# Patient Record
Sex: FEMALE | Race: WHITE | NOT HISPANIC OR LATINO | Employment: FULL TIME | URBAN - METROPOLITAN AREA
[De-identification: names, ages, dates, MRNs, and addresses within clinical notes are randomized per-mention and may not be internally consistent; named-entity substitution may affect disease eponyms.]

---

## 2018-07-04 ENCOUNTER — APPOINTMENT (OUTPATIENT)
Dept: CT IMAGING | Facility: CLINIC | Age: 33
End: 2018-07-04
Attending: EMERGENCY MEDICINE
Payer: COMMERCIAL

## 2018-07-04 ENCOUNTER — APPOINTMENT (OUTPATIENT)
Dept: ULTRASOUND IMAGING | Facility: CLINIC | Age: 33
End: 2018-07-04
Attending: EMERGENCY MEDICINE
Payer: COMMERCIAL

## 2018-07-04 ENCOUNTER — HOSPITAL ENCOUNTER (INPATIENT)
Facility: CLINIC | Age: 33
LOS: 3 days | Discharge: HOME | End: 2018-07-08
Attending: EMERGENCY MEDICINE | Admitting: OBSTETRICS & GYNECOLOGY
Payer: COMMERCIAL

## 2018-07-04 DIAGNOSIS — D62 ANEMIA DUE TO BLOOD LOSS, ACUTE: ICD-10-CM

## 2018-07-04 DIAGNOSIS — Z98.890 S/P LAPAROTOMY: Primary | ICD-10-CM

## 2018-07-04 DIAGNOSIS — R19.00 PELVIC MASS: ICD-10-CM

## 2018-07-04 DIAGNOSIS — N83.519 OVARIAN TORSION: ICD-10-CM

## 2018-07-04 LAB
ALBUMIN SERPL-MCNC: 3.4 G/DL (ref 3.4–5)
ALBUMIN UR-MCNC: 30 MG/DL
ALP SERPL-CCNC: 79 U/L (ref 40–150)
ALT SERPL W P-5'-P-CCNC: 33 U/L (ref 0–50)
ANION GAP SERPL CALCULATED.3IONS-SCNC: 5 MMOL/L (ref 3–14)
APPEARANCE UR: ABNORMAL
AST SERPL W P-5'-P-CCNC: 16 U/L (ref 0–45)
BACTERIA #/AREA URNS HPF: ABNORMAL /HPF
BACTERIA SPEC CULT: NORMAL
BASOPHILS # BLD AUTO: 0 10E9/L (ref 0–0.2)
BASOPHILS NFR BLD AUTO: 0.2 %
BILIRUB DIRECT SERPL-MCNC: <0.1 MG/DL (ref 0–0.2)
BILIRUB SERPL-MCNC: 0.2 MG/DL (ref 0.2–1.3)
BILIRUB UR QL STRIP: NEGATIVE
BUN SERPL-MCNC: 11 MG/DL (ref 7–30)
CALCIUM SERPL-MCNC: 8.9 MG/DL (ref 8.5–10.1)
CHLORIDE SERPL-SCNC: 103 MMOL/L (ref 94–109)
CO2 SERPL-SCNC: 29 MMOL/L (ref 20–32)
COLOR UR AUTO: YELLOW
CREAT SERPL-MCNC: 0.75 MG/DL (ref 0.52–1.04)
DIFFERENTIAL METHOD BLD: ABNORMAL
EOSINOPHIL # BLD AUTO: 0.2 10E9/L (ref 0–0.7)
EOSINOPHIL NFR BLD AUTO: 1 %
ERYTHROCYTE [DISTWIDTH] IN BLOOD BY AUTOMATED COUNT: 17.8 % (ref 10–15)
GFR SERPL CREATININE-BSD FRML MDRD: 89 ML/MIN/1.7M2
GLUCOSE SERPL-MCNC: 136 MG/DL (ref 70–99)
GLUCOSE UR STRIP-MCNC: NEGATIVE MG/DL
HCG SERPL QL: NEGATIVE
HCT VFR BLD AUTO: 32.1 % (ref 35–47)
HGB BLD-MCNC: 9.6 G/DL (ref 11.7–15.7)
HGB UR QL STRIP: NEGATIVE
IMM GRANULOCYTES # BLD: 0.1 10E9/L (ref 0–0.4)
IMM GRANULOCYTES NFR BLD: 0.5 %
KETONES UR STRIP-MCNC: NEGATIVE MG/DL
LEUKOCYTE ESTERASE UR QL STRIP: ABNORMAL
LIPASE SERPL-CCNC: 76 U/L (ref 73–393)
LYMPHOCYTES # BLD AUTO: 2.6 10E9/L (ref 0.8–5.3)
LYMPHOCYTES NFR BLD AUTO: 17.4 %
MCH RBC QN AUTO: 23.5 PG (ref 26.5–33)
MCHC RBC AUTO-ENTMCNC: 29.9 G/DL (ref 31.5–36.5)
MCV RBC AUTO: 79 FL (ref 78–100)
MONOCYTES # BLD AUTO: 0.5 10E9/L (ref 0–1.3)
MONOCYTES NFR BLD AUTO: 3.2 %
MUCOUS THREADS #/AREA URNS LPF: PRESENT /LPF
NEUTROPHILS # BLD AUTO: 11.8 10E9/L (ref 1.6–8.3)
NEUTROPHILS NFR BLD AUTO: 77.7 %
NITRATE UR QL: NEGATIVE
NRBC # BLD AUTO: 0 10*3/UL
NRBC BLD AUTO-RTO: 0 /100
PH UR STRIP: 5 PH (ref 5–7)
PLATELET # BLD AUTO: 605 10E9/L (ref 150–450)
POTASSIUM SERPL-SCNC: 4.5 MMOL/L (ref 3.4–5.3)
PROT SERPL-MCNC: 7.5 G/DL (ref 6.8–8.8)
RBC # BLD AUTO: 4.08 10E12/L (ref 3.8–5.2)
RBC #/AREA URNS AUTO: 3 /HPF (ref 0–2)
SODIUM SERPL-SCNC: 137 MMOL/L (ref 133–144)
SOURCE: ABNORMAL
SP GR UR STRIP: 1.03 (ref 1–1.03)
SPECIMEN SOURCE: NORMAL
SQUAMOUS #/AREA URNS AUTO: 9 /HPF (ref 0–1)
UROBILINOGEN UR STRIP-MCNC: 0 MG/DL (ref 0–2)
WBC # BLD AUTO: 15.1 10E9/L (ref 4–11)
WBC #/AREA URNS AUTO: 16 /HPF (ref 0–5)

## 2018-07-04 PROCEDURE — 80048 BASIC METABOLIC PNL TOTAL CA: CPT | Performed by: EMERGENCY MEDICINE

## 2018-07-04 PROCEDURE — 25000128 H RX IP 250 OP 636: Performed by: EMERGENCY MEDICINE

## 2018-07-04 PROCEDURE — 96376 TX/PRO/DX INJ SAME DRUG ADON: CPT

## 2018-07-04 PROCEDURE — 96374 THER/PROPH/DIAG INJ IV PUSH: CPT | Mod: 59

## 2018-07-04 PROCEDURE — 81001 URINALYSIS AUTO W/SCOPE: CPT | Performed by: EMERGENCY MEDICINE

## 2018-07-04 PROCEDURE — 76856 US EXAM PELVIC COMPLETE: CPT

## 2018-07-04 PROCEDURE — 96375 TX/PRO/DX INJ NEW DRUG ADDON: CPT

## 2018-07-04 PROCEDURE — 84703 CHORIONIC GONADOTROPIN ASSAY: CPT | Performed by: EMERGENCY MEDICINE

## 2018-07-04 PROCEDURE — 99285 EMERGENCY DEPT VISIT HI MDM: CPT | Mod: 25

## 2018-07-04 PROCEDURE — 80076 HEPATIC FUNCTION PANEL: CPT | Performed by: EMERGENCY MEDICINE

## 2018-07-04 PROCEDURE — 85025 COMPLETE CBC W/AUTO DIFF WBC: CPT | Performed by: EMERGENCY MEDICINE

## 2018-07-04 PROCEDURE — 96361 HYDRATE IV INFUSION ADD-ON: CPT

## 2018-07-04 PROCEDURE — 83690 ASSAY OF LIPASE: CPT | Performed by: EMERGENCY MEDICINE

## 2018-07-04 PROCEDURE — 74177 CT ABD & PELVIS W/CONTRAST: CPT

## 2018-07-04 PROCEDURE — 87086 URINE CULTURE/COLONY COUNT: CPT | Performed by: EMERGENCY MEDICINE

## 2018-07-04 RX ORDER — ONDANSETRON 2 MG/ML
4 INJECTION INTRAMUSCULAR; INTRAVENOUS ONCE
Status: COMPLETED | OUTPATIENT
Start: 2018-07-04 | End: 2018-07-04

## 2018-07-04 RX ORDER — HYDROMORPHONE HYDROCHLORIDE 1 MG/ML
0.5 INJECTION, SOLUTION INTRAMUSCULAR; INTRAVENOUS; SUBCUTANEOUS
Status: DISCONTINUED | OUTPATIENT
Start: 2018-07-04 | End: 2018-07-05

## 2018-07-04 RX ORDER — HYDROMORPHONE HYDROCHLORIDE 1 MG/ML
0.5 INJECTION, SOLUTION INTRAMUSCULAR; INTRAVENOUS; SUBCUTANEOUS
Status: COMPLETED | OUTPATIENT
Start: 2018-07-04 | End: 2018-07-04

## 2018-07-04 RX ORDER — IOPAMIDOL 755 MG/ML
500 INJECTION, SOLUTION INTRAVASCULAR ONCE
Status: COMPLETED | OUTPATIENT
Start: 2018-07-04 | End: 2018-07-04

## 2018-07-04 RX ORDER — HYDROXYCHLOROQUINE SULFATE 200 MG/1
400 TABLET, FILM COATED ORAL DAILY
Status: ON HOLD | COMMUNITY
End: 2022-07-29

## 2018-07-04 RX ADMIN — Medication 0.5 MG: at 21:42

## 2018-07-04 RX ADMIN — Medication 0.5 MG: at 20:53

## 2018-07-04 RX ADMIN — SODIUM CHLORIDE 1000 ML: 9 INJECTION, SOLUTION INTRAVENOUS at 20:17

## 2018-07-04 RX ADMIN — IOPAMIDOL 100 ML: 755 INJECTION, SOLUTION INTRAVENOUS at 22:11

## 2018-07-04 RX ADMIN — Medication 0.5 MG: at 22:54

## 2018-07-04 RX ADMIN — SODIUM CHLORIDE 65 ML: 900 INJECTION, SOLUTION INTRAVENOUS at 22:11

## 2018-07-04 RX ADMIN — Medication 0.5 MG: at 20:17

## 2018-07-04 RX ADMIN — ONDANSETRON 4 MG: 2 INJECTION INTRAMUSCULAR; INTRAVENOUS at 20:17

## 2018-07-04 ASSESSMENT — ENCOUNTER SYMPTOMS
NAUSEA: 1
ABDOMINAL PAIN: 1
VOMITING: 0
FEVER: 1

## 2018-07-04 NOTE — IP AVS SNAPSHOT
[facility]    [address]    [facility] [address]    Phone:  [phone]    Fax:  [phone]                                       After Visit Summary   7/4/2018    Melissa Gibbons    MRN: [id number]           After Visit Summary Signature Page     I have received my discharge instructions, and my questions have been answered. I have discussed any challenges I see with this plan with the nurse or doctor.    ..........................................................................................................................................  Patient/Patient Representative Signature      ..........................................................................................................................................  Patient Representative Print Name and Relationship to Patient    ..................................................               ................................................  Date                                            Time    ..........................................................................................................................................  Reviewed by Signature/Title    ...................................................              ..............................................  Date                                                            Time

## 2018-07-04 NOTE — IP AVS SNAPSHOT
MRN:[id number]                      After Visit Summary   7/4/2018    Melissa Gibbons    MRN: [id number]           Thank you!     Thank you for choosing [facility] for your care. Our goal is always to provide you with excellent care. Hearing back from our patients is one way we can continue to improve our services. Please take a few minutes to complete the written survey that you may receive in the mail after you visit. If you would like to speak to someone directly about your visit please contact Patient Relations at [phone]. Thank you!          Patient Information     Date Of Birth          1985        Designated Caregiver       Most Recent Value    Caregiver    Will someone help with your care after discharge? no      About your hospital stay     You were admitted on:  July 5, 2018 You last received care in the:  [facility]    You were discharged on:  July 8, 2018       Who to Call     For medical emergencies, please call 911.  For non-urgent questions about your medical care, please call your primary care provider or clinic, [phone]  For questions related to your surgery, please call your surgery clinic        Attending Provider     Provider Specialty    Bhakti Orourke MD Emergency Medicine    [facility], Gurpreet Artis MD Emergency Medicine    Makayla Damian,  OB/Gyn       Primary Care Provider Office Phone # Fax #    Sanjay Bustillos [phone]      Further instructions from your care team       Dear Melissa-    I'm glad you are doing well enough to go home!    A few limitations after surgery:    No tampons/douching/intercourse x 3 weeks  No lifting more than 25 for 6 weeks.  It takes that long for full strength healing  No driving for at least a week, or until you are off narcotics and sure you can handle the vehicle without limitation.    Pain Medications:  Ibuprofen 600 mg every 6  Hours  Oxycodone 5-10 mg every 4-6 hours as  "needed for severe pain    You are anemic- so take it easy besides walking. To correct this anemia, you need more iron.  I have prescribed:  Ferrous sulfate 325 mg tablet twice daily x 30 days, and due to risk of constipation:  Senna-S stool softener twice daily as needed.    Continue other preop medications as before.    Be sure to call Dr. Damian's clinic to schedule a post op check in two weeks:  [phone]  (call soon)    Please also be sure to call if you have fever, worsening pain, shortness of breath or chest pain, redness around the incision or drainage, or any other questions.     Take care--    Martita Tillman MD 2018           Pending Results     Date and Time Order Name Status Description    2018 0431 Surgical pathology exam In process             Admission Information     Date & Time Provider Department Dept. Phone    2018 Makayla Damian,  [facility] [phone]      Your Vitals Were     Blood Pressure Pulse Temperature Respirations Height Weight    147/76 86 98.5  F (36.9  C) (Oral) 16 1.575 m (5' 2\") 99.8 kg (220 lb)    Pulse Oximetry BMI (Body Mass Index)                94% 40.24 kg/m2          [employer] Information     [employer] lets you send messages to your doctor, view your test results, renew your prescriptions, schedule appointments and more. To sign up, go to [email or web address] . Click on \"Log in\" on the left side of the screen, which will take you to the Welcome page. Then click on \"Sign up Now\" on the right side of the page.     You will be asked to enter the access code listed below, as well as some personal information. Please follow the directions to create your username and password.     Your access code is: [email or web address]  Expires: 10/6/2018  3:42 PM     Your access code will  in 90 days. If you need help or a new code, please call your [place] clinic or [phone].        Care EveryWhere ID     This is your Care EveryWhere ID. " This could be used by other organizations to access your [facility] medical records  [id number]        Equal Access to Services     ANTONIO PHILLIPS : Hadii kalli Snowden, wairene larson, isidoroivonne mathewshelbynick sawyer, kam santacruzana mariaciera mckenna. So [facility] [phone].    ATENCIÓN: Si habla español, tiene a feliz disposición servicios gratuitos de asistencia lingüística. Llame al [phone].    We comply with applicable federal civil rights laws and [place] laws. We do not discriminate on the basis of race, color, national origin, age, disability, sex, sexual orientation, or gender identity.               Review of your medicines      START taking        Dose / Directions    ferrous sulfate 325 (65 Fe) MG tablet   Commonly known as:  IRON   Used for:  Pelvic mass, Anemia due to blood loss, acute        Dose:  325 mg   Take 1 tablet (325 mg) by mouth 2 times daily   Quantity:  60 tablet   Refills:  2       ibuprofen 600 MG tablet   Commonly known as:  ADVIL/MOTRIN   Used for:  Pelvic mass        Dose:  600 mg   Take 1 tablet (600 mg) by mouth every 6 hours as needed for moderate pain   Quantity:  90 tablet   Refills:  1       oxyCODONE IR 5 MG tablet   Commonly known as:  ROXICODONE   Used for:  Pelvic mass        Dose:  5-10 mg   Take 1-2 tablets (5-10 mg) by mouth every 6 hours as needed for severe pain   Quantity:  10 tablet   Refills:  0       senna-docusate 8.6-50 MG per tablet   Commonly known as:  SENOKOT-S;PERICOLACE   Used for:  Pelvic mass, Anemia due to blood loss, acute        Dose:  1 tablet   Take 1 tablet by mouth 2 times daily as needed for constipation   Quantity:  60 tablet   Refills:  1         CONTINUE these medicines which have NOT CHANGED        Dose / Directions    hydroxychloroquine 200 MG tablet   Commonly known as:  PLAQUENIL        Dose:  400 mg   Take 400 mg by mouth daily   Refills:  0       order for DME   Used for:  Foot injury        Post op shoe   Quantity:  1 Device    Refills:  0       PROZAC PO        Dose:  40 mg   Take 40 mg by mouth daily   Refills:  0            Where to get your medicines      Some of these will need a paper prescription and others can be bought over the counter. Ask your nurse if you have questions.     Bring a paper prescription for each of these medications     ferrous sulfate 325 (65 Fe) MG tablet    ibuprofen 600 MG tablet    oxyCODONE IR 5 MG tablet    senna-docusate 8.6-50 MG per tablet                Protect others around you: Learn how to safely use, store and throw away your medicines at www.disposemymeds.org.        ANTIBIOTIC INSTRUCTION     You've Been Prescribed an Antibiotic - Now What?  Your healthcare team thinks that you or your loved one might have an infection. Some infections can be treated with antibiotics, which are powerful, life-saving drugs. Like all medications, antibiotics have side effects and should only be used when necessary. There are some important things you should know about your antibiotic treatment.      Your healthcare team may run tests before you start taking an antibiotic.    Your team may take samples (e.g., from your blood, urine or other areas) to run tests to look for bacteria. These test can be important to determine if you need an antibiotic at all and, if you do, which antibiotic will work best.      Within a few days, your healthcare team might change or even stop your antibiotic.    Your team may start you on an antibiotic while they are working to find out what is making you sick.    Your team might change your antibiotic because test results show that a different antibiotic would be better to treat your infection.    In some cases, once your team has more information, they learn that you do not need an antibiotic at all. They may find out that you don't have an infection, or that the antibiotic you're taking won't work against your infection. For example, an infection caused by a virus can't be treated  with antibiotics. Staying on an antibiotic when you don't need it is more likely to be harmful than helpful.      You may experience side effects from your antibiotic.    Like all medications, antibiotics have side effects. Some of these can be serious.    Let you healthcare team know if you have any known allergies when you are admitted to the hospital.    One significant side effect of nearly all antibiotics is the risk of severe and sometimes deadly diarrhea caused by Clostridium difficile (C. Difficile). This occurs when a person takes antibiotics because some good germs are destroyed. Antibiotic use allows C. diificile to take over, putting patients at high risk for this serious infection.    As a patient or caregiver, it is important to understand your or your loved one's antibiotic treatment. It is especially important for caregivers to speak up when patients can't speak for themselves. Here are some important questions to ask your healthcare team.    What infection is this antibiotic treating and how do you know I have that infection?    What side effects might occur from this antibiotic?    How long will I need to take this antibiotic?    Is it safe to take this antibiotic with other medications or supplements (e.g., vitamins) that I am taking?     Are there any special directions I need to know about taking this antibiotic? For example, should I take it with food?    How will I be monitored to know whether my infection is responding to the antibiotic?    What tests may help to make sure the right antibiotic is prescribed for me?      Information provided by:  www.cdc.gov/getsmart  U.S. Department of Health and Human Services  Centers for disease Control and Prevention  National Center for Emerging and Zoonotic Infectious Diseases  Division of Healthcare Quality Promotion        Information about OPIOIDS     PRESCRIPTION OPIOIDS: WHAT YOU NEED TO KNOW   We gave you an opioid (narcotic) pain medicine. It is  important to manage your pain, but opioids are not always the best choice. You should first try all the other options your care team gave you. Take this medicine for as short a time (and as few doses) as possible.     These medicines have risks:    DO NOT drive when on new or higher doses of pain medicine. These medicines can affect your alertness and reaction times, and you could be arrested for driving under the influence (DUI). If you need to use opioids long-term, talk to your care team about driving.    DO NOT operate heave machinery    DO NOT do any other dangerous activities while taking these medicines.     DO NOT drink any alcohol while taking these medicines.      If the opioid prescribed includes acetaminophen, DO NOT take with any other medicines that contain acetaminophen. Read all labels carefully. Look for the word  acetaminophen  or  Tylenol.  Ask your pharmacist if you have questions or are unsure.    You can get addicted to pain medicines, especially if you have a history of addiction (chemical, alcohol or substance dependence). Talk to your care team about ways to reduce this risk.    Store your pills in a secure place, locked if possible. We will not replace any lost or stolen medicine. If you don t finish your medicine, please throw away (dispose) as directed by your pharmacist. The [place] Pollution Control Agency has more information about safe disposal: https://www.pca.[facility].[place].us/living-green/managing-unwanted-medications.     All opioids tend to cause constipation. Drink plenty of water and eat foods that have a lot of fiber, such as fruits, vegetables, prune juice, apple juice and high-fiber cereal. Take a laxative (Miralax, milk of magnesia, Colace, Senna) if you don t move your bowels at least every other day.              Medication List: This is a list of all your medications and when to take them. Check marks below indicate your daily home schedule. Keep this list as a reference.       Medications           Morning Afternoon Evening Bedtime As Needed    ferrous sulfate 325 (65 Fe) MG tablet   Commonly known as:  IRON   Take 1 tablet (325 mg) by mouth 2 times daily                                hydroxychloroquine 200 MG tablet   Commonly known as:  PLAQUENIL   Take 400 mg by mouth daily   Last time this was given:  400 mg on 7/8/2018  8:29 AM                                ibuprofen 600 MG tablet   Commonly known as:  ADVIL/MOTRIN   Take 1 tablet (600 mg) by mouth every 6 hours as needed for moderate pain                                order for DME   Post op shoe                                oxyCODONE IR 5 MG tablet   Commonly known as:  ROXICODONE   Take 1-2 tablets (5-10 mg) by mouth every 6 hours as needed for severe pain   Last time this was given:  5 mg on 7/8/2018  8:28 AM                                PROZAC PO   Take 40 mg by mouth daily   Last time this was given:  40 mg on 7/8/2018  8:28 AM                                senna-docusate 8.6-50 MG per tablet   Commonly known as:  SENOKOT-S;PERICOLACE   Take 1 tablet by mouth 2 times daily as needed for constipation

## 2018-07-05 ENCOUNTER — APPOINTMENT (OUTPATIENT)
Dept: GENERAL RADIOLOGY | Facility: CLINIC | Age: 33
End: 2018-07-05
Attending: OBSTETRICS & GYNECOLOGY
Payer: COMMERCIAL

## 2018-07-05 ENCOUNTER — SURGERY (OUTPATIENT)
Age: 33
End: 2018-07-05

## 2018-07-05 ENCOUNTER — ANESTHESIA EVENT (OUTPATIENT)
Dept: SURGERY | Facility: CLINIC | Age: 33
End: 2018-07-05
Payer: COMMERCIAL

## 2018-07-05 ENCOUNTER — ANESTHESIA (OUTPATIENT)
Dept: SURGERY | Facility: CLINIC | Age: 33
End: 2018-07-05
Payer: COMMERCIAL

## 2018-07-05 PROBLEM — Z98.890 S/P LAPAROTOMY: Status: ACTIVE | Noted: 2018-07-05

## 2018-07-05 LAB
ABO + RH BLD: NORMAL
ABO + RH BLD: NORMAL
BLD GP AB SCN SERPL QL: NORMAL
BLD PROD TYP BPU: NORMAL
BLOOD BANK CMNT PATIENT-IMP: NORMAL
ERYTHROCYTE [DISTWIDTH] IN BLOOD BY AUTOMATED COUNT: 17.5 % (ref 10–15)
HCT VFR BLD AUTO: 23.1 % (ref 35–47)
HGB BLD-MCNC: 7 G/DL (ref 11.7–15.7)
HGB BLD-MCNC: 7.2 G/DL (ref 11.7–15.7)
LACTATE BLD-SCNC: 1.5 MMOL/L (ref 0.4–1.9)
MCH RBC QN AUTO: 23.5 PG (ref 26.5–33)
MCHC RBC AUTO-ENTMCNC: 30.3 G/DL (ref 31.5–36.5)
MCV RBC AUTO: 78 FL (ref 78–100)
NUM BPU REQUESTED: 2
PLATELET # BLD AUTO: 414 10E9/L (ref 150–450)
RBC # BLD AUTO: 2.98 10E12/L (ref 3.8–5.2)
SPECIMEN EXP DATE BLD: NORMAL
WBC # BLD AUTO: 13.7 10E9/L (ref 4–11)

## 2018-07-05 PROCEDURE — 88341 IMHCHEM/IMCYTCHM EA ADD ANTB: CPT | Performed by: OBSTETRICS & GYNECOLOGY

## 2018-07-05 PROCEDURE — 36000058 ZZH SURGERY LEVEL 3 EA 15 ADDTL MIN: Performed by: OBSTETRICS & GYNECOLOGY

## 2018-07-05 PROCEDURE — 71000012 ZZH RECOVERY PHASE 1 LEVEL 1 FIRST HR: Performed by: OBSTETRICS & GYNECOLOGY

## 2018-07-05 PROCEDURE — 86850 RBC ANTIBODY SCREEN: CPT | Performed by: ANESTHESIOLOGY

## 2018-07-05 PROCEDURE — 40000986 XR ABDOMEN PORT 1 VW

## 2018-07-05 PROCEDURE — 25000128 H RX IP 250 OP 636: Performed by: NURSE ANESTHETIST, CERTIFIED REGISTERED

## 2018-07-05 PROCEDURE — 86901 BLOOD TYPING SEROLOGIC RH(D): CPT | Performed by: ANESTHESIOLOGY

## 2018-07-05 PROCEDURE — 40000306 ZZH STATISTIC PRE PROC ASSESS II: Performed by: OBSTETRICS & GYNECOLOGY

## 2018-07-05 PROCEDURE — 25000128 H RX IP 250 OP 636: Performed by: ANESTHESIOLOGY

## 2018-07-05 PROCEDURE — 86900 BLOOD TYPING SEROLOGIC ABO: CPT | Performed by: ANESTHESIOLOGY

## 2018-07-05 PROCEDURE — 36415 COLL VENOUS BLD VENIPUNCTURE: CPT | Performed by: OBSTETRICS & GYNECOLOGY

## 2018-07-05 PROCEDURE — 71000013 ZZH RECOVERY PHASE 1 LEVEL 1 EA ADDTL HR: Performed by: OBSTETRICS & GYNECOLOGY

## 2018-07-05 PROCEDURE — 88341 IMHCHEM/IMCYTCHM EA ADD ANTB: CPT | Mod: 26 | Performed by: OBSTETRICS & GYNECOLOGY

## 2018-07-05 PROCEDURE — 36416 COLLJ CAPILLARY BLOOD SPEC: CPT | Performed by: OBSTETRICS & GYNECOLOGY

## 2018-07-05 PROCEDURE — 25000128 H RX IP 250 OP 636: Performed by: EMERGENCY MEDICINE

## 2018-07-05 PROCEDURE — 88342 IMHCHEM/IMCYTCHM 1ST ANTB: CPT | Mod: 26 | Performed by: OBSTETRICS & GYNECOLOGY

## 2018-07-05 PROCEDURE — 37000008 ZZH ANESTHESIA TECHNICAL FEE, 1ST 30 MIN: Performed by: OBSTETRICS & GYNECOLOGY

## 2018-07-05 PROCEDURE — 88342 IMHCHEM/IMCYTCHM 1ST ANTB: CPT | Performed by: OBSTETRICS & GYNECOLOGY

## 2018-07-05 PROCEDURE — 25000125 ZZHC RX 250: Performed by: NURSE ANESTHETIST, CERTIFIED REGISTERED

## 2018-07-05 PROCEDURE — 88305 TISSUE EXAM BY PATHOLOGIST: CPT | Mod: 26 | Performed by: OBSTETRICS & GYNECOLOGY

## 2018-07-05 PROCEDURE — 37000009 ZZH ANESTHESIA TECHNICAL FEE, EACH ADDTL 15 MIN: Performed by: OBSTETRICS & GYNECOLOGY

## 2018-07-05 PROCEDURE — 85027 COMPLETE CBC AUTOMATED: CPT | Performed by: OBSTETRICS & GYNECOLOGY

## 2018-07-05 PROCEDURE — 36000056 ZZH SURGERY LEVEL 3 1ST 30 MIN: Performed by: OBSTETRICS & GYNECOLOGY

## 2018-07-05 PROCEDURE — 25000128 H RX IP 250 OP 636: Performed by: OBSTETRICS & GYNECOLOGY

## 2018-07-05 PROCEDURE — 96376 TX/PRO/DX INJ SAME DRUG ADON: CPT

## 2018-07-05 PROCEDURE — 85018 HEMOGLOBIN: CPT | Performed by: ANESTHESIOLOGY

## 2018-07-05 PROCEDURE — 25000132 ZZH RX MED GY IP 250 OP 250 PS 637: Performed by: OBSTETRICS & GYNECOLOGY

## 2018-07-05 PROCEDURE — 0UB90ZZ EXCISION OF UTERUS, OPEN APPROACH: ICD-10-PCS | Performed by: OBSTETRICS & GYNECOLOGY

## 2018-07-05 PROCEDURE — 83605 ASSAY OF LACTIC ACID: CPT | Performed by: OBSTETRICS & GYNECOLOGY

## 2018-07-05 PROCEDURE — 0UJH4ZZ INSPECTION OF VAGINA AND CUL-DE-SAC, PERCUTANEOUS ENDOSCOPIC APPROACH: ICD-10-PCS | Performed by: OBSTETRICS & GYNECOLOGY

## 2018-07-05 PROCEDURE — 86923 COMPATIBILITY TEST ELECTRIC: CPT | Performed by: ANESTHESIOLOGY

## 2018-07-05 PROCEDURE — 88305 TISSUE EXAM BY PATHOLOGIST: CPT | Performed by: OBSTETRICS & GYNECOLOGY

## 2018-07-05 PROCEDURE — 25000125 ZZHC RX 250: Performed by: OBSTETRICS & GYNECOLOGY

## 2018-07-05 PROCEDURE — 12000013 ZZH R&B PEDS

## 2018-07-05 PROCEDURE — 27210794 ZZH OR GENERAL SUPPLY STERILE: Performed by: OBSTETRICS & GYNECOLOGY

## 2018-07-05 PROCEDURE — 25000566 ZZH SEVOFLURANE, EA 15 MIN: Performed by: OBSTETRICS & GYNECOLOGY

## 2018-07-05 RX ORDER — PROPOFOL 10 MG/ML
INJECTION, EMULSION INTRAVENOUS PRN
Status: DISCONTINUED | OUTPATIENT
Start: 2018-07-05 | End: 2018-07-05

## 2018-07-05 RX ORDER — DOCUSATE SODIUM 100 MG/1
100 CAPSULE, LIQUID FILLED ORAL 2 TIMES DAILY
Status: DISCONTINUED | OUTPATIENT
Start: 2018-07-05 | End: 2018-07-08 | Stop reason: HOSPADM

## 2018-07-05 RX ORDER — SODIUM CHLORIDE, SODIUM LACTATE, POTASSIUM CHLORIDE, CALCIUM CHLORIDE 600; 310; 30; 20 MG/100ML; MG/100ML; MG/100ML; MG/100ML
INJECTION, SOLUTION INTRAVENOUS CONTINUOUS
Status: DISCONTINUED | OUTPATIENT
Start: 2018-07-05 | End: 2018-07-05 | Stop reason: HOSPADM

## 2018-07-05 RX ORDER — ONDANSETRON 4 MG/1
4 TABLET, ORALLY DISINTEGRATING ORAL EVERY 30 MIN PRN
Status: DISCONTINUED | OUTPATIENT
Start: 2018-07-05 | End: 2018-07-05 | Stop reason: HOSPADM

## 2018-07-05 RX ORDER — MORPHINE SULFATE 2 MG/ML
2-4 INJECTION, SOLUTION INTRAMUSCULAR; INTRAVENOUS
Status: DISCONTINUED | OUTPATIENT
Start: 2018-07-05 | End: 2018-07-08 | Stop reason: HOSPADM

## 2018-07-05 RX ORDER — DIMENHYDRINATE 50 MG/ML
25 INJECTION, SOLUTION INTRAMUSCULAR; INTRAVENOUS
Status: DISCONTINUED | OUTPATIENT
Start: 2018-07-05 | End: 2018-07-05 | Stop reason: HOSPADM

## 2018-07-05 RX ORDER — GLYCOPYRROLATE 0.2 MG/ML
INJECTION, SOLUTION INTRAMUSCULAR; INTRAVENOUS PRN
Status: DISCONTINUED | OUTPATIENT
Start: 2018-07-05 | End: 2018-07-05

## 2018-07-05 RX ORDER — FENTANYL CITRATE 50 UG/ML
25-50 INJECTION, SOLUTION INTRAMUSCULAR; INTRAVENOUS
Status: DISCONTINUED | OUTPATIENT
Start: 2018-07-05 | End: 2018-07-05 | Stop reason: HOSPADM

## 2018-07-05 RX ORDER — LIDOCAINE HYDROCHLORIDE 10 MG/ML
INJECTION, SOLUTION INFILTRATION; PERINEURAL PRN
Status: DISCONTINUED | OUTPATIENT
Start: 2018-07-05 | End: 2018-07-05

## 2018-07-05 RX ORDER — KETOROLAC TROMETHAMINE 30 MG/ML
30 INJECTION, SOLUTION INTRAMUSCULAR; INTRAVENOUS EVERY 6 HOURS
Status: CANCELLED | OUTPATIENT
Start: 2018-07-05 | End: 2018-07-06

## 2018-07-05 RX ORDER — OXYCODONE HYDROCHLORIDE 5 MG/1
5-10 TABLET ORAL EVERY 4 HOURS PRN
Status: DISCONTINUED | OUTPATIENT
Start: 2018-07-05 | End: 2018-07-08 | Stop reason: HOSPADM

## 2018-07-05 RX ORDER — ONDANSETRON 2 MG/ML
4 INJECTION INTRAMUSCULAR; INTRAVENOUS EVERY 30 MIN PRN
Status: DISCONTINUED | OUTPATIENT
Start: 2018-07-05 | End: 2018-07-05 | Stop reason: HOSPADM

## 2018-07-05 RX ORDER — DIPHENHYDRAMINE HYDROCHLORIDE 50 MG/ML
25 INJECTION INTRAMUSCULAR; INTRAVENOUS EVERY 6 HOURS PRN
Status: DISCONTINUED | OUTPATIENT
Start: 2018-07-05 | End: 2018-07-08 | Stop reason: HOSPADM

## 2018-07-05 RX ORDER — LIDOCAINE 40 MG/G
CREAM TOPICAL
Status: DISCONTINUED | OUTPATIENT
Start: 2018-07-05 | End: 2018-07-05 | Stop reason: HOSPADM

## 2018-07-05 RX ORDER — ACETAMINOPHEN 325 MG/1
975 TABLET ORAL EVERY 8 HOURS
Status: DISPENSED | OUTPATIENT
Start: 2018-07-05 | End: 2018-07-08

## 2018-07-05 RX ORDER — HYDRALAZINE HYDROCHLORIDE 20 MG/ML
2.5-5 INJECTION INTRAMUSCULAR; INTRAVENOUS EVERY 10 MIN PRN
Status: DISCONTINUED | OUTPATIENT
Start: 2018-07-05 | End: 2018-07-05 | Stop reason: HOSPADM

## 2018-07-05 RX ORDER — NALOXONE HYDROCHLORIDE 0.4 MG/ML
.1-.4 INJECTION, SOLUTION INTRAMUSCULAR; INTRAVENOUS; SUBCUTANEOUS
Status: DISCONTINUED | OUTPATIENT
Start: 2018-07-05 | End: 2018-07-05

## 2018-07-05 RX ORDER — BUPIVACAINE HYDROCHLORIDE 2.5 MG/ML
INJECTION, SOLUTION EPIDURAL; INFILTRATION; INTRACAUDAL PRN
Status: DISCONTINUED | OUTPATIENT
Start: 2018-07-05 | End: 2018-07-05 | Stop reason: HOSPADM

## 2018-07-05 RX ORDER — LIDOCAINE 40 MG/G
CREAM TOPICAL
Status: DISCONTINUED | OUTPATIENT
Start: 2018-07-05 | End: 2018-07-08 | Stop reason: HOSPADM

## 2018-07-05 RX ORDER — HYDROXYCHLOROQUINE SULFATE 200 MG/1
400 TABLET, FILM COATED ORAL DAILY
Status: DISCONTINUED | OUTPATIENT
Start: 2018-07-05 | End: 2018-07-08 | Stop reason: HOSPADM

## 2018-07-05 RX ORDER — ACETAMINOPHEN 325 MG/1
650 TABLET ORAL EVERY 4 HOURS PRN
Status: DISCONTINUED | OUTPATIENT
Start: 2018-07-08 | End: 2018-07-08 | Stop reason: HOSPADM

## 2018-07-05 RX ORDER — DEXAMETHASONE SODIUM PHOSPHATE 4 MG/ML
INJECTION, SOLUTION INTRA-ARTICULAR; INTRALESIONAL; INTRAMUSCULAR; INTRAVENOUS; SOFT TISSUE PRN
Status: DISCONTINUED | OUTPATIENT
Start: 2018-07-05 | End: 2018-07-05

## 2018-07-05 RX ORDER — LABETALOL HYDROCHLORIDE 5 MG/ML
10 INJECTION, SOLUTION INTRAVENOUS
Status: DISCONTINUED | OUTPATIENT
Start: 2018-07-05 | End: 2018-07-05 | Stop reason: HOSPADM

## 2018-07-05 RX ORDER — HYDROMORPHONE HYDROCHLORIDE 1 MG/ML
.3-.5 INJECTION, SOLUTION INTRAMUSCULAR; INTRAVENOUS; SUBCUTANEOUS EVERY 5 MIN PRN
Status: DISCONTINUED | OUTPATIENT
Start: 2018-07-05 | End: 2018-07-05 | Stop reason: HOSPADM

## 2018-07-05 RX ORDER — ONDANSETRON 4 MG/1
4 TABLET, ORALLY DISINTEGRATING ORAL EVERY 6 HOURS PRN
Status: DISCONTINUED | OUTPATIENT
Start: 2018-07-05 | End: 2018-07-08 | Stop reason: HOSPADM

## 2018-07-05 RX ORDER — DIPHENHYDRAMINE HCL 25 MG
25 CAPSULE ORAL EVERY 6 HOURS PRN
Status: DISCONTINUED | OUTPATIENT
Start: 2018-07-05 | End: 2018-07-08 | Stop reason: HOSPADM

## 2018-07-05 RX ORDER — SODIUM CHLORIDE, SODIUM LACTATE, POTASSIUM CHLORIDE, CALCIUM CHLORIDE 600; 310; 30; 20 MG/100ML; MG/100ML; MG/100ML; MG/100ML
INJECTION, SOLUTION INTRAVENOUS CONTINUOUS
Status: DISCONTINUED | OUTPATIENT
Start: 2018-07-05 | End: 2018-07-08 | Stop reason: HOSPADM

## 2018-07-05 RX ORDER — HYDROXYCHLOROQUINE SULFATE 200 MG/1
400 TABLET, FILM COATED ORAL DAILY
Status: DISCONTINUED | OUTPATIENT
Start: 2018-07-05 | End: 2018-07-05

## 2018-07-05 RX ORDER — NALOXONE HYDROCHLORIDE 0.4 MG/ML
.1-.4 INJECTION, SOLUTION INTRAMUSCULAR; INTRAVENOUS; SUBCUTANEOUS
Status: DISCONTINUED | OUTPATIENT
Start: 2018-07-05 | End: 2018-07-08 | Stop reason: HOSPADM

## 2018-07-05 RX ORDER — HYDROMORPHONE HYDROCHLORIDE 1 MG/ML
.5-1 INJECTION, SOLUTION INTRAMUSCULAR; INTRAVENOUS; SUBCUTANEOUS
Status: DISCONTINUED | OUTPATIENT
Start: 2018-07-05 | End: 2018-07-05

## 2018-07-05 RX ORDER — ONDANSETRON 2 MG/ML
4 INJECTION INTRAMUSCULAR; INTRAVENOUS EVERY 6 HOURS PRN
Status: DISCONTINUED | OUTPATIENT
Start: 2018-07-05 | End: 2018-07-08 | Stop reason: HOSPADM

## 2018-07-05 RX ORDER — NEOSTIGMINE METHYLSULFATE 1 MG/ML
VIAL (ML) INJECTION PRN
Status: DISCONTINUED | OUTPATIENT
Start: 2018-07-05 | End: 2018-07-05

## 2018-07-05 RX ORDER — MORPHINE SULFATE 2 MG/ML
2-4 INJECTION, SOLUTION INTRAMUSCULAR; INTRAVENOUS
Status: DISCONTINUED | OUTPATIENT
Start: 2018-07-05 | End: 2018-07-05

## 2018-07-05 RX ADMIN — MIDAZOLAM 2 MG: 1 INJECTION INTRAMUSCULAR; INTRAVENOUS at 02:08

## 2018-07-05 RX ADMIN — GLYCOPYRROLATE 0.6 MG: 0.2 INJECTION, SOLUTION INTRAMUSCULAR; INTRAVENOUS at 04:31

## 2018-07-05 RX ADMIN — SODIUM CHLORIDE, POTASSIUM CHLORIDE, SODIUM LACTATE AND CALCIUM CHLORIDE: 600; 310; 30; 20 INJECTION, SOLUTION INTRAVENOUS at 03:56

## 2018-07-05 RX ADMIN — SODIUM CHLORIDE, POTASSIUM CHLORIDE, SODIUM LACTATE AND CALCIUM CHLORIDE: 600; 310; 30; 20 INJECTION, SOLUTION INTRAVENOUS at 18:20

## 2018-07-05 RX ADMIN — LIDOCAINE HYDROCHLORIDE 50 MG: 10 INJECTION, SOLUTION INFILTRATION; PERINEURAL at 04:57

## 2018-07-05 RX ADMIN — FLUOXETINE 40 MG: 20 CAPSULE ORAL at 10:09

## 2018-07-05 RX ADMIN — SODIUM CHLORIDE, POTASSIUM CHLORIDE, SODIUM LACTATE AND CALCIUM CHLORIDE: 600; 310; 30; 20 INJECTION, SOLUTION INTRAVENOUS at 10:58

## 2018-07-05 RX ADMIN — GLYCOPYRROLATE 0.2 MG: 0.2 INJECTION, SOLUTION INTRAMUSCULAR; INTRAVENOUS at 02:13

## 2018-07-05 RX ADMIN — Medication 1 LOZENGE: at 18:43

## 2018-07-05 RX ADMIN — SODIUM CHLORIDE, POTASSIUM CHLORIDE, SODIUM LACTATE AND CALCIUM CHLORIDE: 600; 310; 30; 20 INJECTION, SOLUTION INTRAVENOUS at 02:47

## 2018-07-05 RX ADMIN — ROCURONIUM BROMIDE 36 MG: 10 INJECTION INTRAVENOUS at 02:29

## 2018-07-05 RX ADMIN — SODIUM CHLORIDE, POTASSIUM CHLORIDE, SODIUM LACTATE AND CALCIUM CHLORIDE: 600; 310; 30; 20 INJECTION, SOLUTION INTRAVENOUS at 05:40

## 2018-07-05 RX ADMIN — DOCUSATE SODIUM 100 MG: 100 CAPSULE, LIQUID FILLED ORAL at 08:26

## 2018-07-05 RX ADMIN — ROCURONIUM BROMIDE 10 MG: 10 INJECTION INTRAVENOUS at 03:08

## 2018-07-05 RX ADMIN — FENTANYL CITRATE 50 MCG: 50 INJECTION, SOLUTION INTRAMUSCULAR; INTRAVENOUS at 06:03

## 2018-07-05 RX ADMIN — HYDROXYCHLOROQUINE SULFATE 400 MG: 200 TABLET, FILM COATED ENTERAL at 12:40

## 2018-07-05 RX ADMIN — Medication 0.5 MG: at 06:30

## 2018-07-05 RX ADMIN — HYDROMORPHONE HYDROCHLORIDE 1 MG: 1 INJECTION, SOLUTION INTRAMUSCULAR; INTRAVENOUS; SUBCUTANEOUS at 02:56

## 2018-07-05 RX ADMIN — LIDOCAINE HYDROCHLORIDE 50 MG: 10 INJECTION, SOLUTION INFILTRATION; PERINEURAL at 02:13

## 2018-07-05 RX ADMIN — FENTANYL CITRATE 50 MCG: 50 INJECTION, SOLUTION INTRAMUSCULAR; INTRAVENOUS at 05:53

## 2018-07-05 RX ADMIN — Medication 0.5 MG: at 01:07

## 2018-07-05 RX ADMIN — Medication 0.5 MG: at 00:12

## 2018-07-05 RX ADMIN — BUPIVACAINE HYDROCHLORIDE 4 ML: 2.5 INJECTION, SOLUTION EPIDURAL; INFILTRATION; INTRACAUDAL at 05:06

## 2018-07-05 RX ADMIN — MORPHINE SULFATE 4 MG: 2 INJECTION, SOLUTION INTRAMUSCULAR; INTRAVENOUS at 12:41

## 2018-07-05 RX ADMIN — ROCURONIUM BROMIDE 20 MG: 10 INJECTION INTRAVENOUS at 03:31

## 2018-07-05 RX ADMIN — FENTANYL CITRATE 50 MCG: 50 INJECTION, SOLUTION INTRAMUSCULAR; INTRAVENOUS at 05:28

## 2018-07-05 RX ADMIN — Medication 0.5 MG: at 06:16

## 2018-07-05 RX ADMIN — PROPOFOL 70 MG: 10 INJECTION, EMULSION INTRAVENOUS at 02:29

## 2018-07-05 RX ADMIN — SODIUM CHLORIDE, POTASSIUM CHLORIDE, SODIUM LACTATE AND CALCIUM CHLORIDE: 600; 310; 30; 20 INJECTION, SOLUTION INTRAVENOUS at 03:38

## 2018-07-05 RX ADMIN — ONDANSETRON 4 MG: 2 INJECTION INTRAMUSCULAR; INTRAVENOUS at 02:13

## 2018-07-05 RX ADMIN — Medication 1 LOZENGE: at 21:42

## 2018-07-05 RX ADMIN — ROCURONIUM BROMIDE 4 MG: 10 INJECTION INTRAVENOUS at 02:13

## 2018-07-05 RX ADMIN — PHENYLEPHRINE HYDROCHLORIDE 100 MCG: 10 INJECTION, SOLUTION INTRAMUSCULAR; INTRAVENOUS; SUBCUTANEOUS at 02:55

## 2018-07-05 RX ADMIN — MORPHINE SULFATE 2 MG: 2 INJECTION, SOLUTION INTRAMUSCULAR; INTRAVENOUS at 19:31

## 2018-07-05 RX ADMIN — PHENYLEPHRINE HYDROCHLORIDE 100 MCG: 10 INJECTION, SOLUTION INTRAMUSCULAR; INTRAVENOUS; SUBCUTANEOUS at 03:02

## 2018-07-05 RX ADMIN — PHENYLEPHRINE HYDROCHLORIDE 200 MCG: 10 INJECTION, SOLUTION INTRAMUSCULAR; INTRAVENOUS; SUBCUTANEOUS at 03:12

## 2018-07-05 RX ADMIN — PHENYLEPHRINE HYDROCHLORIDE 100 MCG: 10 INJECTION, SOLUTION INTRAMUSCULAR; INTRAVENOUS; SUBCUTANEOUS at 02:46

## 2018-07-05 RX ADMIN — MORPHINE SULFATE 4 MG: 2 INJECTION, SOLUTION INTRAMUSCULAR; INTRAVENOUS at 15:55

## 2018-07-05 RX ADMIN — FENTANYL CITRATE 100 MCG: 50 INJECTION, SOLUTION INTRAMUSCULAR; INTRAVENOUS at 02:13

## 2018-07-05 RX ADMIN — DEXAMETHASONE SODIUM PHOSPHATE 4 MG: 4 INJECTION, SOLUTION INTRA-ARTICULAR; INTRALESIONAL; INTRAMUSCULAR; INTRAVENOUS; SOFT TISSUE at 02:13

## 2018-07-05 RX ADMIN — DOCUSATE SODIUM 100 MG: 100 CAPSULE, LIQUID FILLED ORAL at 19:48

## 2018-07-05 RX ADMIN — Medication 0.5 MG: at 10:06

## 2018-07-05 RX ADMIN — PROPOFOL 200 MG: 10 INJECTION, EMULSION INTRAVENOUS at 02:13

## 2018-07-05 RX ADMIN — Medication 4 MG: at 04:31

## 2018-07-05 RX ADMIN — Medication 0.5 MG: at 10:37

## 2018-07-05 RX ADMIN — SODIUM CHLORIDE, POTASSIUM CHLORIDE, SODIUM LACTATE AND CALCIUM CHLORIDE: 600; 310; 30; 20 INJECTION, SOLUTION INTRAVENOUS at 01:47

## 2018-07-05 RX ADMIN — FENTANYL CITRATE 50 MCG: 50 INJECTION, SOLUTION INTRAMUSCULAR; INTRAVENOUS at 05:41

## 2018-07-05 RX ADMIN — Medication 160 MG: at 02:13

## 2018-07-05 RX ADMIN — MORPHINE SULFATE 2 MG: 2 INJECTION, SOLUTION INTRAMUSCULAR; INTRAVENOUS at 21:42

## 2018-07-05 RX ADMIN — MORPHINE SULFATE 4 MG: 2 INJECTION, SOLUTION INTRAMUSCULAR; INTRAVENOUS at 17:23

## 2018-07-05 RX ADMIN — HYDROMORPHONE HYDROCHLORIDE 1 MG: 1 INJECTION, SOLUTION INTRAMUSCULAR; INTRAVENOUS; SUBCUTANEOUS at 03:33

## 2018-07-05 ASSESSMENT — ENCOUNTER SYMPTOMS
DYSRHYTHMIAS: 0
SEIZURES: 0
STRIDOR: 0

## 2018-07-05 ASSESSMENT — ACTIVITIES OF DAILY LIVING (ADL)
BATHING: 0-->INDEPENDENT
RETIRED_COMMUNICATION: 0-->UNDERSTANDS/COMMUNICATES WITHOUT DIFFICULTY
TOILETING: 0-->INDEPENDENT
RETIRED_EATING: 0-->INDEPENDENT
SWALLOWING: 0-->SWALLOWS FOODS/LIQUIDS WITHOUT DIFFICULTY
DRESS: 0-->INDEPENDENT

## 2018-07-05 ASSESSMENT — LIFESTYLE VARIABLES: TOBACCO_USE: 0

## 2018-07-05 ASSESSMENT — COPD QUESTIONNAIRES: COPD: 0

## 2018-07-05 NOTE — ED NOTES
ED VISIT ADDENDUM:    The care of this patient was signed out to me at shift change by Dr Orourke pending pelvic ultrasound.  On recheck, the patient is having continued and significant abdominal pain with right lower quadrant.  Ultrasound is consistent with a right ovarian or adnexal mass and is highly concerning for torsion, which is consistent with the clinical picture.  I attempted to call her primary OB/GYN group, who do not have no privileges here, and her only obstetric or gynecologic history is IUD placement.  They recommended contacting the OB/GYN unassigned at [facility].  Dr. Campo will come to see the patient and operate emergently given the above.  On recheck the patient's pain is slightly improved but concern continues to exist for torsion.    Gurpreet Graves MD  [facility]  [facility]           Gurpreet Graves MD  07/05/18 0125

## 2018-07-05 NOTE — OP NOTE
Operative Note:  Preoperative Diagnosis:    1. Right adnexal mass  Postoperative Diagnosis:    1.  Incarcerated fibroid  2.  Hemoperitoneum     Procedure:  Procedure(s):  Diagnostic Laparoscopy with conversion to laparotomy with removal of incarcerated fibroid    Surgeon:  Makayla Damian   Anesthesiologist: Vega Morgan MD  CRNA: Benjamin Huerta APRN CRNA  Circulator: Kale Muhammad RN  Scrub Person: Lili Callahan  Anesthesia:  General endotracheal with local    Findings:  Normal fallopian tubes and ovaries.  A normal appearing uterus with an 18cm pedunculated fibroid that was torsed and incarcerated at the fundus to right cornual region.  There was moderate blood in the pelvis.  I would recommend she proceed with c-sections for her pregnancies due to the location of the defect and repair.  I could not tell if we got into her uterine cavity at the time of surgery.      Complications:  None  Specimens:  Incarcerated fibroid   Estimated Blood Loss: 700cc      Description of Operative Procedure:    Melissa Gibbons was taken to the operating room with IV fluids running.  She was placed on the operating table in dorsal lithotomy position and general endotracheal anesthesia was obtained without difficulty.  Melissa was then placed in the dorsal lithotomy position with legs in the Tree stirrups.  She was prepped and draped in the usual sterile fashion.  The bladder was then drained with a renae catheter.  A speculum was placed into the vagina and the anterior lip of the cervix was grasped with a single tenaculum.  A acorn uterine manipulator was then placed into the cervix.  The single-toothed tenaculum and speculum were removed from the vagina.  Attention was then turned to the abdomen.    0.25% marcaine with epi was injected subcutaneously at the umbilicus.  A five mm skin incision was then made at the umbilicus using a scalpel.  A 5  mm blunt trocar with a 5 mm laparoscope was placed under direct  visualization. Intraabdominal placement was confirmed with the laparoscope with no evidence of visceral trauma and the pressure was turned to 15.  The abdomen and pelvis were insufflated with 3 liters carbon dioxide gas in order to obtain adequate pneumoperitoneum.      Next, the right lower quadrant was anesthetized by injecting 5 cc's of local anesthetic subcutaneously at the site of the planned skin incision.  A 5 mm skin incision was made using a scalpel.  A 5 mm trocar was then placed through the RLQ incision under direct visualization with the laparoscope with no evidence of visceral or vascular trauma.    Next, the left lower quadrant was anesthetized by injecting 5 cc's of local anesthetic subcutaneously at the site of the planned skin incision.  A 11 mm skin incision was made using a scalpel.  A 11 mm trocar was then placed through the LLQ incision under direct visualization with the laparoscope with no evidence of visceral or vascular trauma.    The laparoscope was then used to make a survey of the abdomen and pelvis with the findings noted above.    We attempted to untwist the fibroid, but due to the size was unable to.  The laparoscopic harmonic was used to remove the fibroid.  Due to bleeding where the fibroid was attached to the uterine fundus/right cornua and size of the mass, decision was made to proceed to an open procedure.  All trocars were then removed from the abdomen after allowing the carbon dioxide gas to escape from the pelvis and abdomen.       A Pfannenstiel incision was made with the scalpel and a Bovie used to carry the incision down to the under laying fascia.  The fasica was incised in the midline and extended with the Bovie.  The fascia was undermined and the rectus mm were  in the midline.  The peritoneum was then entered with a combination of sharp and blunt dissection.  The peritoneal incision was extended superioly and inferiorly with the Bovie.  The Pelvis was then  explored, the more retractor was placed and the abdominal contents packed away.    The right uterine cornual region was identify and clamped.  The defect was repaired with a #0 vicryl in a running lock fashion for excellent hemostasis.  Attention was then turn to the fibroid mass.  Due to the size and shape, the mass was removed in 2 pieces.  Attention was turned back to the uterine defect and there was noted to be hemostasis. The packs and retractors were removed.  Sponge counts were correct.  The peritoneum was closed with a running #2-0 vicryl.  The fascia was closed with a running #0 vicryl.  The subcutaneous tissue was closed with #2-0 vicryl. The skin was closed with absorbable staples.  Hemostasis was assured.  The skin was closed using 4-O Monocryl.  The uterine manipulator was removed.  Sponge were correct.  An xray was done before the patient left the room as the laparotomy tray was not counted prior to use.  The patient tolerated the procedure well and was discharged to the recovery room in good condition.      Makayla Damian

## 2018-07-05 NOTE — PLAN OF CARE
Problem: Patient Care Overview  Goal: Plan of Care/Patient Progress Review  Outcome: Improving  Temp max 99.2, pt slightly tachycardic at 100-110 this shift. Abdominal incisional dressing intact, pt rating pain to a 7. Dilaudid given with no decrease in pain, med changed to MS 4mg and pt then with good relief and able to rest this afternoon. Septic protocol triggered and lactic acid drawn, level back at 1.5. Pt sebastián small amts of cl liqs, no nausea,  good urine output from renae. IV fluids cont at 125/hr. Pt continues on 3L of O2, maintaining sats 92-96%. Started on incentive spirometry. Sig other at bedside.

## 2018-07-05 NOTE — ANESTHESIA POSTPROCEDURE EVALUATION
Patient: Melissa Gibbons    Procedure(s):   - Wound Class: I-Clean  diagnostic laparoscopy converted to laparotomy, removal of incarcerated fibroid  - Wound Class: II-Clean Contaminated    Diagnosis:ovarian torsion  Diagnosis Additional Information: large incarcerated fibroid     Anesthesia Type:  General, ETT    Note:  Anesthesia Post Evaluation    Patient location during evaluation: PACU  Patient participation: Able to fully participate in evaluation  Level of consciousness: sleepy but conscious  Pain management: adequate  Airway patency: patent  Cardiovascular status: acceptable  Respiratory status: acceptable  Hydration status: acceptable  PONV: controlled     Anesthetic complications: None          Last vitals:  Vitals:    07/05/18 0100 07/05/18 0115 07/05/18 0141   BP:   142/79   Pulse:      Resp:   16   Temp:   98.6  F (37  C)   SpO2: 92% 91% 92%         Electronically Signed By: Vega Morgan MD  July 5, 2018  5:19 AM

## 2018-07-05 NOTE — ANESTHESIA CARE TRANSFER NOTE
Patient: Melissa Gibbons    Procedure(s):   - Wound Class: I-Clean  diagnostic laparoscopy converted to laparotomy, removal of incarcerated fibroid  - Wound Class: II-Clean Contaminated    Diagnosis: ovarian torsion  Diagnosis Additional Information: No value filed.    Anesthesia Type:   General, ETT     Note:  Airway :Face Mask  Patient transferred to:PACU  Comments: Gabriela Report: Identifed the Patient, Identified the Reponsible Provider, Reviewed the pertinent medical history, Discussed the surgical course, Reviewed Intra-OP anesthesia mangement and issues during anesthesia, Set expectations for post-procedure period and Allowed opportunity for questions and acknowledgement of understanding      Vitals: (Last set prior to Anesthesia Care Transfer)    CRNA VITALS  7/5/2018 0431 - 7/5/2018 0507      7/5/2018             Pulse: 107    SpO2: 100 %    Resp Rate (observed): 9                Electronically Signed By: JAKE Kuo CRNA  July 5, 2018  5:07 AM

## 2018-07-05 NOTE — ANESTHESIA PREPROCEDURE EVALUATION
Anesthesia Evaluation     . Pt has had prior anesthetic. Type: General    No history of anesthetic complications          ROS/MED HX    ENT/Pulmonary:     (+)IDA risk factors obese, , . .   (-) tobacco use, asthma, COPD and recent URI   Neurologic:  - neg neurologic ROS    (-) seizures and CVA   Cardiovascular:  - neg cardiovascular ROS      (-) hypertension, CAD, arrhythmias and valvular problems/murmurs   METS/Exercise Tolerance:     Hematologic: Comments: Lab Test        07/04/18 1954          WBC          15.1*         HGB          9.6*          MCV          79            PLT          605*           Lab Test        07/04/18 1954          NA           137           POTASSIUM    4.5           CHLORIDE     103           CO2          29            BUN          11            CR           0.75          ANIONGAP     5             HECTOR          8.9           GLC          136*            (+) Anemia, -      Musculoskeletal:   (+) arthritis, , , -       GI/Hepatic:     (+) GERD Asymptomatic on medication,      (-) hepatitis and liver disease   Renal/Genitourinary:  - ROS Renal section negative       Endo:     (+) Obesity, .   (-) Type I DM, Type II DM, thyroid disease and chronic steroid usage   Psychiatric:     (+) psychiatric history depression      Infectious Disease:  - neg infectious disease ROS       Malignancy:      - no malignancy   Other:    - neg other ROS                 Physical Exam  Normal systems: cardiovascular, pulmonary and dental    Airway   Mallampati: III  TM distance: >3 FB  Neck ROM: full    Dental     Cardiovascular   Rhythm and rate: regular and normal  (-) no friction rub, no systolic click and no murmur    Pulmonary    breath sounds clear to auscultation(-) no rhonchi, no decreased breath sounds, no wheezes, no rales and no stridor                    Anesthesia Plan      History & Physical Review  History and physical reviewed and following  examination; no interval change.    ASA Status:  2 .    NPO Status:  > 8 hours    Plan for General and ETT with Intravenous induction. Maintenance will be Balanced.    PONV prophylaxis:  Ondansetron (or other 5HT-3) and Dexamethasone or Solumedrol       Postoperative Care  Postoperative pain management:  IV analgesics.      Consents  Anesthetic plan, risks, benefits and alternatives discussed with:  Patient or representative and Patient..                          .

## 2018-07-05 NOTE — PROGRESS NOTES
[facility] Gynecology Post-Op / Progress Note         Assessment and Plan:    Assessment:   Post-operative day #0  Laparoscopy, laparotomy, resection of necrotic pedunculated fibroid     Doing well.  No immediate surgical complications identified.  No excessive bleeding  Pain well-controlled.  Asymptomatic post op anemia      Plan:   Ambulate  Advance activity as tolerated  Pain control measures  Advance diet as tolerated           Interval History:   Doing well.  Continues to improve.  Pain is well-controlled.  No fevers.            Significant Problems:      Patient Active Problem List   Diagnosis     S/P laparotomy             Review of Systems:    The patient denies any chest pain, shortness of breath, excessive pain, fever, chills, purulent drainage from the wound, nausea or vomiting.          Medications:   All medications related to the patient's surgery have been reviewed          Physical Exam:     All vitals stable  Patient Vitals for the past 8 hrs:   BP Temp Temp src Heart Rate Resp SpO2   07/05/18 1219 139/59 99.2  F (37.3  C) - 110 28 -   07/05/18 1037 132/67 - - 104 19 94 %   07/05/18 0954 125/66 - - 111 20 93 %   07/05/18 0849 131/65 - - 105 19 92 %   07/05/18 0823 128/66 - - 108 20 91 %   07/05/18 0751 125/67 98.9  F (37.2  C) - 110 20 93 %   07/05/18 0715 135/74 - - 112 12 94 %   07/05/18 0700 125/60 - - 111 28 95 %   07/05/18 0645 128/65 98.3  F (36.8  C) Temporal 109 18 93 %   07/05/18 0630 135/83 99  F (37.2  C) Temporal 105 10 95 %   07/05/18 0535 (!) 166/107 - - 109 12 94 %   07/05/18 0530 170/86 - - 110 12 100 %   07/05/18 0525 (!) 153/111 - - 113 13 98 %   07/05/18 0520 (!) 140/95 - - 103 15 98 %   07/05/18 0515 (!) 140/108 - - 105 13 100 %   07/05/18 0510 (!) 167/92 - - 112 17 95 %   07/05/18 0505 (!) 110/94 97.8  F (36.6  C) Temporal 110 12 94 %     Bandage clean and dry with minimal or no drainage.  Surrounding skin with minimal erythema.  Ext NT          Data:     Hemoglobin    Date Value Ref Range Status   07/05/2018 7.0 (L) 11.7 - 15.7 g/dL Final   07/05/2018 7.2 (L) 11.7 - 15.7 g/dL Final   07/04/2018 9.6 (L) 11.7 - 15.7 g/dL Final     -    Fransisco Rawls MD

## 2018-07-05 NOTE — OR NURSING
Patient continues to be tachycardic in pacu with heart rate in low 100's.  ETCO2 on capnography is reading mainly 55-57.  Spoke to Dr. Leavitt and he stated patient ok to go to floor.

## 2018-07-05 NOTE — ED TRIAGE NOTES
Woke at 3 am with worse abd  Pain that she has ever had.   Right lower quad  Hurt to move sit can not get comfortable  Unable pass gas  Nauseated   Chills     Rates 9/10  Here for eval  Has no urinary symptoms

## 2018-07-05 NOTE — ED PROVIDER NOTES
History     Chief Complaint:  Abdominal Pain      HPI   Melissa Gibbons is a 33 year old female who presents to the emergency department today for evaluation of abdominal pain. The patient reports went to bed last night, 07/03/18, feeling fine and then she woke up at 0200 this morning with severe abdominal pain in her right lower quadrant which has radiated to her back in the last few hours. She states she is nauseous and feels as though she could vomit, and has been unable to pass gas or have a bowel movement which is abnormal for her. She states that this morning she took a gas-x, with no relief. Here in the ED she has a fever and states it hurts to move and breathe, stating her pain is a 9/10. The patient states she has a history of ovarian cysts, but this pain feels different. She denies any vaginal pain, vaginal discharge, or chance of pergnancy    Allergies:  No Known Allergies     Medications:    Prozac  Plaquenil    Past Medical History:    Depressive disorder  Joint pain    Past Surgical History:    Cholecystectomy     Family History:    Father: hypertension  Maternal grandfather: heart disease  Maternal grandmother: arthritis, diabetes, cancer  Paternal grandmother: Psychiatric illness    Social History:  The patient was accompanied to the ED by her significant other.  Smoking Status: Never Smoker  Smokeless Tobacco: Never Used  Alcohol Use: Positive   Marital Status:  Single      Review of Systems   Constitutional: Positive for fever.   Gastrointestinal: Positive for abdominal pain and nausea. Negative for vomiting.   Genitourinary: Negative for vaginal discharge and vaginal pain.   All other systems reviewed and are negative.    Physical Exam     Patient Vitals for the past 24 hrs:   BP Temp Temp src Pulse Resp SpO2 Height Weight   07/05/18 0014 - - - - - 94 % - -   07/04/18 2300 - - - - - 92 % - -   07/04/18 2200 - - - - - 92 % - -   07/04/18 2145 - - - - - 90 % - -   07/04/18 2130 - - - - - 93 % - -  "  07/04/18 2115 - - - - - 93 % - -   07/04/18 2100 - - - - - 93 % - -   07/04/18 2056 - - - - - 94 % - -   07/04/18 2030 - - - - - 94 % - -   07/04/18 1943 (!) 160/99 100.1  F (37.8  C) Oral 125 16 99 % 1.575 m (5' 2\") 99.8 kg (220 lb)        Physical Exam  Constitutional: The patient is oriented to person, place, and time. Alert and cooperative.  HENT:   Right Ear: External ear normal.   Left Ear: External ear normal.   Nose: Nose normal.   Mouth/Throat: Moist mucous membranes.   Eyes: Conjunctivae, EOM and lids are normal.    Neck: Trachea normal. Normal range of motion. Neck supple.   Cardiovascular: tachcyardia, regular rhythm, normal heart sounds, and intact distal pulses.    Pulmonary/Chest: Effort normal and breath sounds equal bilaterally. No crackles or wheezing.   Abdominal: Soft. Tenderness to palpation in the RLQ. No rebound and no guarding.   Musculoskeletal: Normal range of motion.  No extremity tenderness or edema.  Neurological: Alert and Oriented. Moves all extremities equally.   Skin: Skin is dry. No rash noted.        Emergency Department Course     Imaging:  Radiology findings were communicated with the patient who voiced understanding of the findings.    US Pelvic Complete with Transvaginal   Preliminary Result   IMPRESSION:   1. Large right adnexal mass most likely representing an ovarian mass.   This could be benign or malignant.   2. The possibility of an enlarged torsed right ovary could not be   completely excluded. Correlate clinically.      CT Abdomen Pelvis w Contrast   Final Result   IMPRESSION:   1. Large heterogeneously dense mass in the lower abdomen/upper pelvis   is concerning for malignancy given the size and appearance. It is   difficult to ascertain the origin of the mass on this study. It does   appear to be separate from the ovaries and uterus. Tissue sampling is   recommended. CT PET may also be helpful.   2. Small amount of free fluid in the abdomen and pelvis may be "   reactive to the mass.   3. Small nodule in the posterior left costophrenic angle measures up   to 1.1 cm. Recommendations for an incidental lung nodule > 8mm:     Low risk patients: Consider follow-up CT in three months, PET/CT,   and/or tissue sampling.     High risk patients: Same as for low risk patients.      *Low Risk: Minimal or absent history of smoking or other known risk   factors.   *Nonsolid (ground-glass) or partly solid nodules may require longer   follow-up to exclude indolent adenocarcinoma.   *Recommendations based on Guidelines for the Management of Incidental   Pulmonary Nodules Detected at CT: From the Fleischner Society 2017,   Radiology 2017.   4. Mild diffuse fatty infiltration of the liver.   5. No other evidence for metastasis is identified.      I called Dr. Orourke on 7/4/2018 at 11:02 p.m. regarding the large   intraperitoneal mass, the ascites, and small nodule in the left   posterior inferior lung.      MOIZ MAURICIO MD        Laboratory:  Laboratory findings were communicated with the patient who voiced understanding of the findings.    HCG qualitative:negative  UA with micro: protein albumin 30 (A), Leukocyte Esterase moderate (A), WBC/HPF 16 (H), RBC/HPF 3 (H), bacteria few (A), squamous epithelial/ HPF 9(H), mucous present (A) o/w negative   CBC: WBC 15.1(H), HGB 9.6(L), (H)  BMP: glucose 136 (H) o/w WNL (Creatinine 0.75)  Lipase: 76  Hepatic panel: AWNL    Interventions:  2017 NS 1000 mL IV  2017 Zofran 4 mg IV  2017 Dilaudid 0.5 mg IV  2053 Dilaudid 0.5 mg IV  2142 Dilaudid 0.5 mg IV    Emergency Department Course:    1951 Nursing notes and vitals reviewed.    1954 IV was inserted and blood was drawn for laboratory testing, results above.     2015 I performed an exam of the patient as documented above.     2037 The patient provided a urine sample here in the emergency department. This was sent for laboratory testing, findings above.     The patient was sent for a CT abdomen  pelvis while in the emergency department, results above.      Patient Signed out to Dr. Graves pending US results and final disposition.    Impression & Plan      Medical Decision Making:  Melissa Gibbons is a 33 year old female who presents to the emergency department today for evaluation of abdominal pain.  Upon presentation in the ED, patient is nontoxic-appearing.   She is tachycardic, hypertensive, and with a low-grade temperature, but vitals are otherwise within normal limits and stable.  On exam, she is well-appearing.  She is alert, oriented, neurologic exam is nonfocal.  Aside from tachycardia, cardiopulmonary exam is unremarkable.  On abdominal examination, she does endorse tenderness palpation in the right lower quadrant.  There is no rebound or guarding.  The rest of her exam is as mentioned above.    Labs were obtained and are as mentioned above.  Notably, she does have a leukocytosis.  Urinalysis is remarkable for 16 white blood cells and moderate leukocyte esterase.  However, this is not a clean-catch specimen and the patient denies urinary symptoms, therefore do not feel that this reflects a urinary tract infection.  Urine culture was added on.  If this is abnormal, the patient will be treated at that time.  CT of the abdomen was obtained initially and demonstrates a large heterogeneously dense mass in the lower abdomen/upper pelvis.  Per radiology, this is concerning for malignancy given the size and appearance the origin of the mass on the study is difficult to ascertain.  These results were discussed with the patient.  Given the location, I am concerned this could represent an adnexal mass, therefore ultrasound of the pelvis was ordered.  The patient was signed out to my colleague, Dr. Graves pending results of the ultrasound of the pelvis and final disposition.  She was stable/improved at the time of signout.    Diagnosis:  Abdominal pain    Disposition:   Signed out to Dr. Star Phillips  Disclosure:  I, Ashley Rony, am serving as a scribe at 7:52 PM on 7/4/2018 to document services personally performed by Bhakti Orourke MD based on my observations and the provider's statements to me.     [facility] EMERGENCY DEPARTMENT       Bhakti Orourke MD  07/05/18 0029

## 2018-07-05 NOTE — H&P
[facility]    History and Physical  Obstetrics and Gynecology     Date of Admission:  7/4/2018    Code Status   Full Code    Primary Care Physician   MAURICE LYNNE    Chief Complaint   Abdominal pain and right ovarian cyst    History is obtained from the patient    History of Present Illness   Melissa Gibbons is a 33 year old female who presented to the ER with right lower quadrant pain that started yesterday (7/4/18) at 2 AM.  The pain got worse and she could not find relief at home so she came to the ER.  Her symptoms included nausea but no vomiting.  She denies any vaginal discharge or vaginal discharge.  She denied pregnancy as she has a selam IUD that was placed in 2017.  Pelvic ultrasound showed a right ovarian or adnexal mass with no obvious blood flow and concerning for ovarian torsion.  She has a history of ovarian cysts but has never needed surgery for them.        Past Medical History    -joint pain  -depressive disorder    Past Surgical History   -cholecystectomy  -bilateral carpal tunnel surgery    Prior to Admission Medications   Prior to Admission Medications   Prescriptions Last Dose Informant Patient Reported? Taking?   FLUoxetine HCl (PROZAC PO) 7/4/2018 at Unknown time  Yes Yes   ORDER FOR DME   No No   Sig: Post op shoe   hydroxychloroquine (PLAQUENIL) 200 MG tablet 7/4/2018  Yes Yes   Sig: Take 200 mg by mouth daily      Facility-Administered Medications: None     Allergies   No Known Allergies    Social History   Patient presents with her significant other is not a smoker    Family History   Family history reviewed with patient and is noncontributory.    Review of Systems   The 5 point Review of Systems is negative other than noted in the HPI or here.     Physical Exam   Temp: 98.6  F (37  C) Temp src: Temporal BP: 142/79 Pulse: 125   Resp: 16 SpO2: 92 % O2 Device: None (Room air)    Vital Signs with Ranges  Temp:  [98.6  F (37  C)-100.1  F (37.8  C)] 98.6  F (37  C)  Pulse:   [125] 125  Resp:  [16] 16  BP: (122-160)/(68-99) 142/79  SpO2:  [90 %-99 %] 92 %  220 lbs 0 oz    Constitutional: awake, alert, cooperative, no apparent distress, and appears stated age  Respiratory: No increased work of breathing, good air exchange, clear to auscultation bilaterally, no crackles or wheezing  Cardiovascular: Normal apical impulse, regular rate and rhythm, normal S1 and S2, no S3 or S4, and no murmur noted  GI: soft, tender in right lower quadrant without rebound or guarding.    Genitounirinary: deferred  Skin/Extremities: no bruising or bleeding      Data   Results for orders placed or performed during the hospital encounter of 07/04/18 (from the past 24 hour(s))   CBC with platelets differential   Result Value Ref Range    WBC 15.1 (H) 4.0 - 11.0 10e9/L    RBC Count 4.08 3.8 - 5.2 10e12/L    Hemoglobin 9.6 (L) 11.7 - 15.7 g/dL    Hematocrit 32.1 (L) 35.0 - 47.0 %    MCV 79 78 - 100 fl    MCH 23.5 (L) 26.5 - 33.0 pg    MCHC 29.9 (L) 31.5 - 36.5 g/dL    RDW 17.8 (H) 10.0 - 15.0 %    Platelet Count 605 (H) 150 - 450 10e9/L    Diff Method Automated Method     % Neutrophils 77.7 %    % Lymphocytes 17.4 %    % Monocytes 3.2 %    % Eosinophils 1.0 %    % Basophils 0.2 %    % Immature Granulocytes 0.5 %    Nucleated RBCs 0 0 /100    Absolute Neutrophil 11.8 (H) 1.6 - 8.3 10e9/L    Absolute Lymphocytes 2.6 0.8 - 5.3 10e9/L    Absolute Monocytes 0.5 0.0 - 1.3 10e9/L    Absolute Eosinophils 0.2 0.0 - 0.7 10e9/L    Absolute Basophils 0.0 0.0 - 0.2 10e9/L    Abs Immature Granulocytes 0.1 0 - 0.4 10e9/L    Absolute Nucleated RBC 0.0    Basic metabolic panel   Result Value Ref Range    Sodium 137 133 - 144 mmol/L    Potassium 4.5 3.4 - 5.3 mmol/L    Chloride 103 94 - 109 mmol/L    Carbon Dioxide 29 20 - 32 mmol/L    Anion Gap 5 3 - 14 mmol/L    Glucose 136 (H) 70 - 99 mg/dL    Urea Nitrogen 11 7 - 30 mg/dL    Creatinine 0.75 0.52 - 1.04 mg/dL    GFR Estimate 89 >60 mL/min/1.7m2    GFR Estimate If Black >90 >60  mL/min/1.7m2    Calcium 8.9 8.5 - 10.1 mg/dL   Hepatic panel   Result Value Ref Range    Bilirubin Direct <0.1 0.0 - 0.2 mg/dL    Bilirubin Total 0.2 0.2 - 1.3 mg/dL    Albumin 3.4 3.4 - 5.0 g/dL    Protein Total 7.5 6.8 - 8.8 g/dL    Alkaline Phosphatase 79 40 - 150 U/L    ALT 33 0 - 50 U/L    AST 16 0 - 45 U/L   HCG qualitative   Result Value Ref Range    HCG Qualitative Serum Negative NEG^Negative   Lipase   Result Value Ref Range    Lipase 76 73 - 393 U/L   UA with Microscopic   Result Value Ref Range    Color Urine Yellow     Appearance Urine Cloudy     Glucose Urine Negative NEG^Negative mg/dL    Bilirubin Urine Negative NEG^Negative    Ketones Urine Negative NEG^Negative mg/dL    Specific Gravity Urine 1.027 1.003 - 1.035    Blood Urine Negative NEG^Negative    pH Urine 5.0 5.0 - 7.0 pH    Protein Albumin Urine 30 (A) NEG^Negative mg/dL    Urobilinogen mg/dL 0.0 0.0 - 2.0 mg/dL    Nitrite Urine Negative NEG^Negative    Leukocyte Esterase Urine Moderate (A) NEG^Negative    Source Midstream Urine     WBC Urine 16 (H) 0 - 5 /HPF    RBC Urine 3 (H) 0 - 2 /HPF    Bacteria Urine Few (A) NEG^Negative /HPF    Squamous Epithelial /HPF Urine 9 (H) 0 - 1 /HPF    Mucous Urine Present (A) NEG^Negative /LPF   Urine Culture Aerobic Bacterial   Result Value Ref Range    Specimen Description Midstream Urine     Culture Micro Canceled, Test credited  Duplicate request      CT Abdomen Pelvis w Contrast    Narrative    CT ABDOMEN PELVIS W CONTRAST  7/4/2018 10:50 PM    HISTORY: Right lower quadrant/midabdominal pain.    TECHNIQUE: Scans obtained from the diaphragm through the pelvis with  IV contrast, 100 mL Isovue-370. Radiation dose for this scan was  reduced using automated exposure control, adjustment of the mA and/or  kV according to patient size, or iterative reconstruction technique.    COMPARISON:  CT abdomen and pelvis dated 7/4/2018.    FINDINGS: Nodular density along the posterior left costophrenic angle  pleura  measures 1.1 cm. Visualized portions of the lung bases are  otherwise grossly clear. Visualized mediastinal contents are  unremarkable. Bilateral L5 spondylolysis with anterior listhesis of L5  on S1 are noted. No aggressive osseous lesions.    There is diffuse fatty infiltration of the liver. Patient is status  post cholecystectomy. There is contrast in the bilateral intrarenal  collecting systems indicating delayed imaging after contrast  administration. The liver, pancreas, spleen, bilateral adrenal glands  and bilateral kidneys otherwise enhance normally. There is a small  amount of ascites adjacent to the liver edge. Small amount of free  fluid is also seen in the pelvis and lower abdomen. No adenopathy or  free air is identified. Aorta is normal in appearance.    The colon is of normal caliber without pericolonic inflammatory change  to suggest acute diverticulitis. Appendix is grossly unremarkable.  Small bowel is of normal caliber.    There is a large heterogeneously dense mass in the lower abdomen and  upper pelvis slightly to the right. This is too large to identify the  origin. This measures approximately 18.8 x 13.1 x 13.9 cm in the  transverse, AP and craniocaudal dimensions respectively.    The uterus is grossly of normal size. Intrauterine device is seen in  expected position. The ovaries are grossly seen bilaterally and appear  to be separate from the mass and appear within normal limits.    Mildly prominent lymph nodes are noted in the mesenteric adipose  tissue but are still well within the normal size criterion.      Impression    IMPRESSION:  1. Large heterogeneously dense mass in the lower abdomen/upper pelvis  is concerning for malignancy given the size and appearance. It is  difficult to ascertain the origin of the mass on this study. It does  appear to be separate from the ovaries and uterus. Tissue sampling is  recommended. CT PET may also be helpful.  2. Small amount of free fluid in the  abdomen and pelvis may be  reactive to the mass.  3. Small nodule in the posterior left costophrenic angle measures up  to 1.1 cm. Recommendations for an incidental lung nodule > 8mm:    Low risk patients: Consider follow-up CT in three months, PET/CT,  and/or tissue sampling.    High risk patients: Same as for low risk patients.    *Low Risk: Minimal or absent history of smoking or other known risk  factors.  *Nonsolid (ground-glass) or partly solid nodules may require longer  follow-up to exclude indolent adenocarcinoma.  *Recommendations based on Guidelines for the Management of Incidental  Pulmonary Nodules Detected at CT: From the Fleischner Society 2017,  Radiology 2017.  4. Mild diffuse fatty infiltration of the liver.  5. No other evidence for metastasis is identified.    I called Dr. Orourke on 7/4/2018 at 11:02 p.m. regarding the large  intraperitoneal mass, the ascites, and small nodule in the left  posterior inferior lung.    MOIZ MAURICIO MD   US Pelvic Complete with Transvaginal    Narrative    US PELVIC COMPLETE WITH TRANSVAGINAL  7/5/2018 12:06 AM     INDICATION: Right lower quadrant pain, right-sided lower abdominal  mass on CT.    COMPARISON: CT 7/4/2018.    FINDINGS: Transabdominal and transvaginal imaging were performed.  Transvaginal imaging was performed to better evaluate the ovaries.  Uterus measures 9.8 x 3.6 x 4.3 cm. Endometrial stripe measures 0.6 cm  in thickness and there is an IUD in the central endometrium.    Left ovary not seen. Large pelvic/right adnexal region mass measuring  11.5 x 13.8 x 12.3 cm corresponding to the mass seen on CT. This  appears minimally complex but mostly solid. This most likely  represents an ovarian mass. No obvious blood flow is seen within this  on limited color Doppler images. Small amount of free fluid.      Impression    IMPRESSION:  1. Large right adnexal mass most likely representing an ovarian mass.  This could be benign or malignant.  2. The  possibility of an enlarged, torsed right ovary could not be  completely excluded. Correlate clinically.    BRANNON BARRAZA MD       Assessment & Plan   Melissa Gibbons is a 33 year old female who presents with right ovarian mass and likely ovarian torsion    I discussed the options with the patient and her partner and they both agree to proceed with a right ovarian cystectomy for likely ovarian torsion.      Surgery risks  All of the risks and benefits of surgery were discussed including the risk of anesthesia, infection, blood loss (need for transfusion), damage to other organs including bowel and bladder, need for a laparotomy. and death.  All of her questions and concerns were answered.      Makayla Damian, DO

## 2018-07-06 LAB
BACTERIA SPEC CULT: NORMAL
ERYTHROCYTE [DISTWIDTH] IN BLOOD BY AUTOMATED COUNT: 17.7 % (ref 10–15)
HCT VFR BLD AUTO: 20.3 % (ref 35–47)
HGB BLD-MCNC: 5.9 G/DL (ref 11.7–15.7)
MCH RBC QN AUTO: 23.7 PG (ref 26.5–33)
MCHC RBC AUTO-ENTMCNC: 29.1 G/DL (ref 31.5–36.5)
MCV RBC AUTO: 82 FL (ref 78–100)
PLATELET # BLD AUTO: 341 10E9/L (ref 150–450)
RBC # BLD AUTO: 2.49 10E12/L (ref 3.8–5.2)
SPECIMEN SOURCE: NORMAL
WBC # BLD AUTO: 9.6 10E9/L (ref 4–11)

## 2018-07-06 PROCEDURE — 85027 COMPLETE CBC AUTOMATED: CPT | Performed by: OBSTETRICS & GYNECOLOGY

## 2018-07-06 PROCEDURE — 36415 COLL VENOUS BLD VENIPUNCTURE: CPT | Performed by: OBSTETRICS & GYNECOLOGY

## 2018-07-06 PROCEDURE — 25000128 H RX IP 250 OP 636: Performed by: OBSTETRICS & GYNECOLOGY

## 2018-07-06 PROCEDURE — 25000132 ZZH RX MED GY IP 250 OP 250 PS 637: Performed by: OBSTETRICS & GYNECOLOGY

## 2018-07-06 PROCEDURE — 12000011 ZZH R&B MS OVERFLOW

## 2018-07-06 RX ADMIN — FLUOXETINE 40 MG: 20 CAPSULE ORAL at 08:43

## 2018-07-06 RX ADMIN — RANITIDINE 150 MG: 150 TABLET ORAL at 08:44

## 2018-07-06 RX ADMIN — ONDANSETRON 4 MG: 2 INJECTION INTRAMUSCULAR; INTRAVENOUS at 18:25

## 2018-07-06 RX ADMIN — DOCUSATE SODIUM 100 MG: 100 CAPSULE, LIQUID FILLED ORAL at 20:13

## 2018-07-06 RX ADMIN — MORPHINE SULFATE 2 MG: 2 INJECTION, SOLUTION INTRAMUSCULAR; INTRAVENOUS at 08:41

## 2018-07-06 RX ADMIN — DOCUSATE SODIUM 100 MG: 100 CAPSULE, LIQUID FILLED ORAL at 08:43

## 2018-07-06 RX ADMIN — HYDROXYCHLOROQUINE SULFATE 400 MG: 200 TABLET, FILM COATED ENTERAL at 08:44

## 2018-07-06 RX ADMIN — SODIUM CHLORIDE, POTASSIUM CHLORIDE, SODIUM LACTATE AND CALCIUM CHLORIDE: 600; 310; 30; 20 INJECTION, SOLUTION INTRAVENOUS at 02:20

## 2018-07-06 RX ADMIN — MORPHINE SULFATE 4 MG: 2 INJECTION, SOLUTION INTRAMUSCULAR; INTRAVENOUS at 18:41

## 2018-07-06 RX ADMIN — Medication 1 LOZENGE: at 20:17

## 2018-07-06 RX ADMIN — SODIUM CHLORIDE, POTASSIUM CHLORIDE, SODIUM LACTATE AND CALCIUM CHLORIDE: 600; 310; 30; 20 INJECTION, SOLUTION INTRAVENOUS at 10:20

## 2018-07-06 RX ADMIN — RANITIDINE 150 MG: 150 TABLET ORAL at 20:13

## 2018-07-06 RX ADMIN — MORPHINE SULFATE 2 MG: 2 INJECTION, SOLUTION INTRAMUSCULAR; INTRAVENOUS at 06:08

## 2018-07-06 RX ADMIN — SODIUM CHLORIDE, POTASSIUM CHLORIDE, SODIUM LACTATE AND CALCIUM CHLORIDE: 600; 310; 30; 20 INJECTION, SOLUTION INTRAVENOUS at 18:31

## 2018-07-06 RX ADMIN — MORPHINE SULFATE 2 MG: 2 INJECTION, SOLUTION INTRAMUSCULAR; INTRAVENOUS at 01:42

## 2018-07-06 NOTE — PROGRESS NOTES
[facility] Post-Op / Progress Note         Assessment and Plan:    Assessment:   Post-operative day #1  Diagnostic laparoscopy with laparotomy and resection of necrotic pedunculated fibroid         Doing well.  Clean wound without signs of infection.  Normal healing wound.  Pain is controlled on IV meds as is not up to full diet  Asymptomatic post-op anemia but has not been up walking today (hgb 5.9)      Plan:   Ambulation encouraged  Remove renae catheter today to encourage ambulation   Hemoglobin in AM  Will wait on blood transfusion until patient is up and ambulating to see if she is symptomatic   Pain control measures as needed, will try to switch to oral pain medications once on regular diet  Likely home in 1-2 days           Interval History:   Doing well.  Pain is controlled.  No fevers.  No history of wound drainage, warmth or significant erythema.  Has not been up walking yet.            Significant Problems:    Active Problems:    S/P laparotomy            Review of Systems:    The patient denies any chest pain, shortness of breath, excessive pain, fever, chills, purulent drainage from the wound, nausea or vomiting.          Medications:     Current Facility-Administered Medications   Medication     [START ON 7/8/2018] acetaminophen (TYLENOL) tablet 650 mg     acetaminophen (TYLENOL) tablet 975 mg     benzocaine-menthol (CHLORASEPTIC) 6-10 MG lozenge 1 lozenge     diphenhydrAMINE (BENADRYL) capsule 25 mg    Or     diphenhydrAMINE (BENADRYL) injection 25 mg     docusate sodium (COLACE) capsule 100 mg     FLUoxetine (PROzac) capsule 40 mg     hydroxychloroquine (PLAQUENIL) tablet 400 mg     lactated ringers infusion     lidocaine (LMX4) kit     lidocaine 1 % 1 mL     morphine (PF) injection 2-4 mg     naloxone (NARCAN) injection 0.1-0.4 mg     ondansetron (ZOFRAN-ODT) ODT tab 4 mg    Or     ondansetron (ZOFRAN) injection 4 mg     oxyCODONE IR (ROXICODONE) tablet 5-10 mg     ranitidine (ZANTAC) tablet  150 mg     sodium chloride (PF) 0.9% PF flush 3 mL     sodium chloride (PF) 0.9% PF flush 3 mL             Physical Exam:   All vitals stable  Temp: 98.4  F (36.9  C) Temp src: Oral BP: 126/69 Pulse: 86 Heart Rate: 113 Resp: 18 SpO2: 96 % O2 Device: Nasal cannula Oxygen Delivery: 4 LPM (weaned to 2LNC)  Wound clean and dry with minimal or no drainage.  Surrounding skin with minimal erythema.          Data:     All laboratory data related to this surgery reviewed  Hemoglobin   Date Value Ref Range Status   07/06/2018 5.9 (LL) 11.7 - 15.7 g/dL Final     Comment:     This result has been called to ANA MARÍA WALKER (PEDS) by Gerri Villalboos on 07 06 2018 at 0629, and has been read back.      07/05/2018 7.0 (L) 11.7 - 15.7 g/dL Final   07/05/2018 7.2 (L) 11.7 - 15.7 g/dL Final   07/04/2018 9.6 (L) 11.7 - 15.7 g/dL Final         Makayla Damian DO  7/6/2018 7:54 AM

## 2018-07-06 NOTE — PLAN OF CARE
Problem: Patient Care Overview  Goal: Plan of Care/Patient Progress Review  Outcome: No Change  Pain managed with IV morphine. Bowel sounds hypoactive. Dressing c/d/iDenies passing flatus. Urinary catheter in place. No PO intake overnight

## 2018-07-06 NOTE — PLAN OF CARE
Problem: Patient Care Overview  Goal: Plan of Care/Patient Progress Review  Outcome: Improving  VSS with exception of oxygen saturation.  Is 83-85% on RA; currently requiring oxygen at 2.5L NC.  IS at bedside and encouraged to use. Pt very sleepy in between cares.  Voided post renae removal.  Bowel sounds hypoactive; -f/-s.  Tolerating full liquid diet; advanced to regular.  Incision C/D/I; surgical dressing removed by doctor in am.  Pt ambulated in brown SBA.  Independent when up to bathroom.  Will continue to monitor and wean oxygen as able.

## 2018-07-06 NOTE — PLAN OF CARE
Problem: Patient Care Overview  Goal: Plan of Care/Patient Progress Review  Outcome: Improving  Pt pain is being controlled by IV analgesics, ice pack, rest, and distraction. Tolerating clear liquids with no increase in pain or nausea. Jacob is intact. Continue to monitor and assess needs.

## 2018-07-07 LAB
BLD PROD TYP BPU: NORMAL
BLD PROD TYP BPU: NORMAL
BLD UNIT ID BPU: 0
BLD UNIT ID BPU: 0
BLOOD PRODUCT CODE: NORMAL
BLOOD PRODUCT CODE: NORMAL
BPU ID: NORMAL
BPU ID: NORMAL
HGB BLD-MCNC: 6.4 G/DL (ref 11.7–15.7)
HGB BLD-MCNC: 8.1 G/DL (ref 11.7–15.7)
TRANSFUSION STATUS PATIENT QL: NORMAL

## 2018-07-07 PROCEDURE — 25000132 ZZH RX MED GY IP 250 OP 250 PS 637: Performed by: OBSTETRICS & GYNECOLOGY

## 2018-07-07 PROCEDURE — 85018 HEMOGLOBIN: CPT | Performed by: OBSTETRICS & GYNECOLOGY

## 2018-07-07 PROCEDURE — 25000128 H RX IP 250 OP 636: Performed by: OBSTETRICS & GYNECOLOGY

## 2018-07-07 PROCEDURE — 36415 COLL VENOUS BLD VENIPUNCTURE: CPT | Performed by: OBSTETRICS & GYNECOLOGY

## 2018-07-07 PROCEDURE — P9016 RBC LEUKOCYTES REDUCED: HCPCS | Performed by: ANESTHESIOLOGY

## 2018-07-07 PROCEDURE — 12000011 ZZH R&B MS OVERFLOW

## 2018-07-07 RX ADMIN — OXYCODONE HYDROCHLORIDE 5 MG: 5 TABLET ORAL at 16:12

## 2018-07-07 RX ADMIN — DIPHENHYDRAMINE HYDROCHLORIDE 25 MG: 25 CAPSULE ORAL at 10:21

## 2018-07-07 RX ADMIN — ACETAMINOPHEN 975 MG: 325 TABLET, FILM COATED ORAL at 10:20

## 2018-07-07 RX ADMIN — MORPHINE SULFATE 4 MG: 2 INJECTION, SOLUTION INTRAMUSCULAR; INTRAVENOUS at 02:15

## 2018-07-07 RX ADMIN — SODIUM CHLORIDE, POTASSIUM CHLORIDE, SODIUM LACTATE AND CALCIUM CHLORIDE: 600; 310; 30; 20 INJECTION, SOLUTION INTRAVENOUS at 02:15

## 2018-07-07 RX ADMIN — ACETAMINOPHEN 975 MG: 325 TABLET, FILM COATED ORAL at 16:11

## 2018-07-07 RX ADMIN — RANITIDINE 150 MG: 150 TABLET ORAL at 10:21

## 2018-07-07 RX ADMIN — OXYCODONE HYDROCHLORIDE 5 MG: 5 TABLET ORAL at 20:47

## 2018-07-07 RX ADMIN — SODIUM CHLORIDE, POTASSIUM CHLORIDE, SODIUM LACTATE AND CALCIUM CHLORIDE: 600; 310; 30; 20 INJECTION, SOLUTION INTRAVENOUS at 15:55

## 2018-07-07 RX ADMIN — DOCUSATE SODIUM 100 MG: 100 CAPSULE, LIQUID FILLED ORAL at 10:21

## 2018-07-07 RX ADMIN — HYDROXYCHLOROQUINE SULFATE 400 MG: 200 TABLET, FILM COATED ENTERAL at 10:21

## 2018-07-07 RX ADMIN — FLUOXETINE 40 MG: 20 CAPSULE ORAL at 10:21

## 2018-07-07 NOTE — PLAN OF CARE
A&O x 4. Up with SBA- c/o dizziness when OOB. Lethargic. Denies pain. LR at 125 ml/hr. 1 unit of PRBC running, recheck hgb at 1800 tonight.

## 2018-07-07 NOTE — PROVIDER NOTIFICATION
07/07/18 0738   Significant Event   Significant Event Other (see comments)  (Hgb 6.4)     Bedside RN jere, MD notified and will order RBC's

## 2018-07-07 NOTE — PLAN OF CARE
"Problem: Patient Care Overview  Goal: Plan of Care/Patient Progress Review  Outcome: Declining  Afebrile. VSS, requiring 5 LPM via Oximask to maintain sats in mid-high 90's. Pain rated 4/10 controlled with Morphine, discussed transition to orals with next pain meds and patient in agreement with plan. Endorses dizziness, headache, extreme fatigue, diaphoresis. Pale. Received 1 unit of RBC's, tolerated well. Patient stated, \"I feel so much better\". Abdomen obese, soft, tender at incisions. Dressings C/D/I. BS hypoactive. Voiding. SBA to bathroom, encouraged to call d/t symptoms. Appeared to sleep comfortably between cares.        "

## 2018-07-07 NOTE — PROVIDER NOTIFICATION
0711 Office called to notified MD of critical hgb  0815 Dr. Jamie kinney and notified of hgb of 6.4 1 more unit of PRBC ordered

## 2018-07-07 NOTE — PROGRESS NOTES
[facility] POD#2         Interval History:   Patient has no complaints other than typical postop soreness.  She has passed flatus.  She tolerates Regular diet well as of last evening but states she is not hungry.  Notes dizziness and feeling very tired with ambulation.  Will note SOA when O2 is removed or trouble catching her breath but denies any CP or SOA on O2 or at rest.  Pain has been controlled with IV pain meds.             Physical Exam:   Patient Vitals for the past 24 hrs:   BP Temp Temp src Heart Rate Resp SpO2   07/07/18 1025 - - - - - (!) 87 %   07/07/18 1018 127/71 98  F (36.7  C) Oral 83 16 95 %   07/07/18 0742 - - - - - 98 %   07/07/18 0739 124/73 98.3  F (36.8  C) Oral 69 18 100 %   07/07/18 0615 115/62 98.2  F (36.8  C) Oral 75 18 98 %   07/07/18 0515 117/63 98.2  F (36.8  C) Oral 80 18 94 %   07/07/18 0415 116/66 98.2  F (36.8  C) Oral 87 18 99 %   07/07/18 0356 119/68 98.6  F (37  C) Oral 95 18 99 %   07/07/18 0205 142/68 98.8  F (37.1  C) Oral 96 18 96 %   07/06/18 2014 - - - - - 94 %   07/06/18 1920 - - - 92 18 -   07/06/18 1900 - - - - - 95 %   07/06/18 1841 - - - - - 94 %   07/06/18 1840 - - - - - (!) 88 %   07/06/18 1545 138/69 98.3  F (36.8  C) Oral 85 18 95 %   07/06/18 1300 - - - 89 18 93 %   07/06/18 1150 - - - 90 20 (!) 85 %   07/06/18 1100 - - - - - 96 %       Intake/Output Summary (Last 24 hours) at 07/07/18 1034  Last data filed at 07/07/18 0615   Gross per 24 hour   Intake           743.75 ml   Output               75 ml   Net           668.75 ml       Lungs: clear to auscultation bilaterally  CV:  Regular Rate  Abdomen:   Normal bowel sounds, non-distended, soft, and appropriately tender   Incision(s):  Clean, dry, and intact  Extremities:  No edema, no calf tenderness.  Pneumatic compression stockings in place.          Data:     Hemoglobin   Date Value Ref Range Status   07/07/2018 6.4 (LL) 11.7 - 15.7 g/dL Final     Comment:     This result has been called to YULIANA  BOLIVAR ON PEDS by Kristal Bradley   on 07 07 2018 at 0733, and has been read back.      07/06/2018 5.9 (LL) 11.7 - 15.7 g/dL Final     Comment:     This result has been called to ANA MARÍA WALKER (PEDS) by Gerri Villalobos on 07 06 2018 at 0629, and has been read back.                  Assessment and Plan:    Assessment:   1)  Post-operative day #2 s/p Diagnostic laparoscopy, laparotomy with resection of necrotic pedunculated myoma  2)  Anemia s/p 1 unit of PRBC's with Hgb 6.4      Plan:   1)  Will transfuse a 2nd until of PRBC's and repeat Hgb this evening and in am.  Will try to wean off of O2 once pt is feeling better.  Suspect O2 needs are secondary to severe anemia as pt denies any hx of asthma or respiratory ds.  Will closely monitor.  May consider consulting Hospitalist service if pt continues to require O2 after improvement of Hgb and asymptomatic.  2)  Switch to PO pain medications today with increase in regular diet.  Oxycodone and Tylenol as needed.  Pt can not take Motrin as she has hx of gastritis.  Jacob removed yesterday and she is up to bathroom with assist. Voiding well.  3)  Anticipate discharge tomorrow if pain tolerated on PO meds, off of O2 and asymptomatic Hgb level.  Will d/c home with BID FeSO4.  4)  Pt aware of surgical findings as reviewed by Dr. Damian.  Knows C/S is recommended for any potential pregnancies int he future.      Laurie Ayala,

## 2018-07-07 NOTE — PLAN OF CARE
Problem: Patient Care Overview  Goal: Plan of Care/Patient Progress Review  Outcome: No Change  VSS. Requiring oxygen 5L oxymask. Morphine x1 for pain management. One episode of nausea with emesis. Zofran x1. Tolerated grilled cheese sandwich for dinner. PIV infusing. Up independent to bathroom. Recheck hgb in AM. Will continue to monitor.

## 2018-07-07 NOTE — PROVIDER NOTIFICATION
Notified provider of symptoms of low hemoglobin, dizziness, headache, oxygen requirements of 5 LPM. Provider ordered 1 unit transfused and then a repeat hemoglobin after the transfusion.

## 2018-07-08 VITALS
WEIGHT: 220 LBS | OXYGEN SATURATION: 94 % | HEIGHT: 62 IN | RESPIRATION RATE: 16 BRPM | SYSTOLIC BLOOD PRESSURE: 147 MMHG | DIASTOLIC BLOOD PRESSURE: 76 MMHG | TEMPERATURE: 98.5 F | HEART RATE: 86 BPM | BODY MASS INDEX: 40.48 KG/M2

## 2018-07-08 LAB
HGB BLD-MCNC: 7.5 G/DL (ref 11.7–15.7)
HGB BLD-MCNC: 8.1 G/DL (ref 11.7–15.7)

## 2018-07-08 PROCEDURE — 85018 HEMOGLOBIN: CPT | Performed by: OBSTETRICS & GYNECOLOGY

## 2018-07-08 PROCEDURE — 25000132 ZZH RX MED GY IP 250 OP 250 PS 637: Performed by: OBSTETRICS & GYNECOLOGY

## 2018-07-08 PROCEDURE — 36415 COLL VENOUS BLD VENIPUNCTURE: CPT | Performed by: OBSTETRICS & GYNECOLOGY

## 2018-07-08 PROCEDURE — 25000128 H RX IP 250 OP 636: Performed by: OBSTETRICS & GYNECOLOGY

## 2018-07-08 RX ORDER — OXYCODONE HYDROCHLORIDE 5 MG/1
5-10 TABLET ORAL EVERY 6 HOURS PRN
Qty: 10 TABLET | Refills: 0 | Status: SHIPPED | OUTPATIENT
Start: 2018-07-08 | End: 2022-06-13

## 2018-07-08 RX ORDER — AMOXICILLIN 250 MG
1 CAPSULE ORAL 2 TIMES DAILY PRN
Qty: 60 TABLET | Refills: 1 | Status: SHIPPED | OUTPATIENT
Start: 2018-07-08 | End: 2022-06-13

## 2018-07-08 RX ORDER — FERROUS SULFATE 325(65) MG
325 TABLET ORAL 2 TIMES DAILY
Qty: 60 TABLET | Refills: 2 | Status: ON HOLD | OUTPATIENT
Start: 2018-07-08 | End: 2022-07-29

## 2018-07-08 RX ORDER — IBUPROFEN 600 MG/1
600 TABLET, FILM COATED ORAL EVERY 6 HOURS PRN
Qty: 90 TABLET | Refills: 1 | Status: SHIPPED | OUTPATIENT
Start: 2018-07-08 | End: 2022-06-13

## 2018-07-08 RX ADMIN — RANITIDINE 150 MG: 150 TABLET ORAL at 08:28

## 2018-07-08 RX ADMIN — SODIUM CHLORIDE, POTASSIUM CHLORIDE, SODIUM LACTATE AND CALCIUM CHLORIDE: 600; 310; 30; 20 INJECTION, SOLUTION INTRAVENOUS at 00:04

## 2018-07-08 RX ADMIN — HYDROXYCHLOROQUINE SULFATE 400 MG: 200 TABLET, FILM COATED ENTERAL at 08:29

## 2018-07-08 RX ADMIN — OXYCODONE HYDROCHLORIDE 5 MG: 5 TABLET ORAL at 00:00

## 2018-07-08 RX ADMIN — DOCUSATE SODIUM 100 MG: 100 CAPSULE, LIQUID FILLED ORAL at 08:28

## 2018-07-08 RX ADMIN — OXYCODONE HYDROCHLORIDE 5 MG: 5 TABLET ORAL at 08:28

## 2018-07-08 RX ADMIN — ACETAMINOPHEN 975 MG: 325 TABLET, FILM COATED ORAL at 00:01

## 2018-07-08 RX ADMIN — FLUOXETINE 40 MG: 20 CAPSULE ORAL at 08:28

## 2018-07-08 NOTE — PLAN OF CARE
Problem: Patient Care Overview  Goal: Plan of Care/Patient Progress Review  Outcome: Improving  Afebrile. VSS. Pain in abdomen rated 5/10 controlled with scheduled Tylenol and Oxycodone. Abdomen obese, soft, tender at incisions. Increased pain with lower bikini incision. Redness across abdomen marked with skin pen. Dressings C/D/I. Moisture in crease with bikini incision. Tolerating regular diet with fair appetite. New IV d/t infiltration. Voiding. Appeared to sleep comfortably between cares.

## 2018-07-08 NOTE — PROGRESS NOTES
"POD # 3    S/p laparoscopy converted to laparotomy for removal of 18 cm torsed myoma.  Pathology still pending.  Acute blood loss anemia  Morbid obesity, BMI 40.2    SUBJECTIVE:    Nursing and patient report she is doing much better.  Much more awake today. In bed with 1l O2 on.  Received 2 u pRBC. Passing flatus.  Eating. Feels incisional pain.  RN outlined redness yesterday, gone today.    OBJECTIVE:  Blood pressure 137/79, pulse 86, temperature 98.5  F (36.9  C), temperature source Oral, resp. rate 16, height 1.575 m (5' 2\"), weight 99.8 kg (220 lb), SpO2 96 %.  Body mass index is 40.24 kg/(m^2).     WDWN obese woman, A&O, cheerful.  Incisions CDI. Has wick dressing on.  No erythema or signs of infection.  Painful for her when I lift pannus to look at incision.      Recent Labs  Lab 07/08/18  0612 07/07/18  1831 07/07/18  0651 07/06/18  0558 07/05/18  0819   HGB 7.5* 8.1* 6.4* 5.9* 7.0*     ASSESSMENT:  POD #3 s/p laparotomy with removal of 18 cm torsed myoma, pathology pending.  Obesity, BMI 40.  Acute blood loss anemia, s/p 2 units pRBC.  May be drifting downward.  Post op hypoxia, needed supplementary O2, now may be able to wean off.  Likely related to anemia.    PLAN:  DC O2 and monitor sats.  Needs to ambulate more, using incentive spirometer.  Check hgb at noon.  If stable and AVSS, ,may be able to DC home later today.  DC restrictions, lifting restrictions reviewed.  Will need iron supplement at discharge.  F/U with surgeon or primary OB in 2 weeks.    Martita Tillman MD on 7/8/2018 at 7:59 AM    "

## 2018-07-08 NOTE — DISCHARGE SUMMARY
"DISCHARGE SUMMARY     Day of Admission:  7/4/18    Date discharged:  July 8, 2018    Admitting diagnosis: Pelvic mass, abdominal pain, nausea, morbid obesity, anemia    Discharge diagnosis: s/p laparotomy with myomectomy for 18 cm torsed ovary.  Acute blood loss anemia.  Post operative hypoxia, transient.  S/p 2 u pRBC transfusion. Morbid obesity, BMI 40.2    Narrative:    Melissa Gibbons is a 33 year old  woman admitted on 7*/4/18 for RLQ pain and nausea.  See admission H&P for details.  It was thought that she may have had ovarian torsion.    Hospital Course:    Melissa Gibbons was admitted and taken to surgery on 7/5/18 for laparoscopy.  An 18 cm torsed, incarcerated fibroid was found, along with hemoperitoneum.  Attempts at laparoscopic resection led to increased bleeding, so laparotomy was performed.     Post operatively, she was slow to move.  preop hemoglobin was 9.6, ankit was 5.9.  She received 2 units pRBC, and discharge hgg is stable at 8.1.  She needed extra oxygen until today.  Now she can walk on room air, pulse 113 with walking, 90's at rest.  She feels well and feels ready for discharge.  She is tolerating a regular diet, and passing flatus. She has a productive cough, and O2 sats have improved as she is coughing and clearing. She has not had fever.    Vital signs:  Temp: 98.5  F (36.9  C) Temp src: Oral BP: 147/76   Heart Rate: 88 (in chair post ambulation) Resp: 16 SpO2: 94 % (in chair post ambulation) O2 Device: None (Room air) Oxygen Delivery: 1/2 LPM Height: 157.5 cm (5' 2\") Weight: 99.8 kg (220 lb)  Estimated body mass index is 40.24 kg/(m^2) as calculated from the following:    Height as of this encounter: 1.575 m (5' 2\").    Weight as of this encounter: 99.8 kg (220 lb).      She is instructed to lift no more than 25# for 6 weeks, no driving x1 week and off narcotics, and pelvic rest advised for 3 weeks.  She is instructed to make a follow up appt with her OB surgeon, Dr. Damian, in 2 weeks.  She will " take ferrous sulfate 325 mg po BID x 30 days, with Senna S BID prn.  Ibuprofen 600 mg every 6 hours and oxycodone 5-10 mg po every 4 hours as needed (#10) are also prescribed.     Restrictions and precautions reviewed, questions answered.   Pathology is still pending.      Martita Tillman MD July 8, 2018

## 2018-07-08 NOTE — PLAN OF CARE
"Problem: Patient Care Overview  Goal: Plan of Care/Patient Progress Review  Outcome: Improving  VSS. Tylenol and oxycodone for pain management. Incisions c/d/i. Sats 90-95% on 1L NC. Tolerating regular diet this evening. PIV infusing. BM this shift. Patient states \"I feel so much better today\". Will continue to monitor and provide for needs.       "

## 2018-07-08 NOTE — PLAN OF CARE
Problem: Patient Care Overview  Goal: Plan of Care/Patient Progress Review  Outcome: Adequate for Discharge Date Met: 07/08/18  Able to wean O2 to RA this afternoon.  Pt diligently using IS and having productive cough of yellow sputum.  Lungs clear.  Pain controlled with minimal amounts po oxy.  Voiding.  Passing flatus.  Hgb 8.1 today.  Tolerating regular diet.  Ambulating independently in halls.  Dressing C/D/I; no redness. Discharged to home with  assist.  Discharge instructions given; all questions answered.  Aware of follow up recommendations.

## 2018-07-08 NOTE — DISCHARGE INSTRUCTIONS
Dear Melissa-    I'm glad you are doing well enough to go home!    A few limitations after surgery:    No tampons/douching/intercourse x 3 weeks  No lifting more than 25 for 6 weeks.  It takes that long for full strength healing  No driving for at least a week, or until you are off narcotics and sure you can handle the vehicle without limitation.    Pain Medications:  Ibuprofen 600 mg every 6  Hours  Oxycodone 5-10 mg every 4-6 hours as needed for severe pain    You are anemic- so take it easy besides walking. To correct this anemia, you need more iron.  I have prescribed:  Ferrous sulfate 325 mg tablet twice daily x 30 days, and due to risk of constipation:  Senna-S stool softener twice daily as needed.    Continue other preop medications as before.    Be sure to call Dr. Damian's clinic to schedule a post op check in two weeks:  [phone]  (call soon)    Please also be sure to call if you have fever, worsening pain, shortness of breath or chest pain, redness around the incision or drainage, or any other questions.     Take care--    Martita Tillman MD July 8, 2018

## 2018-07-09 LAB — COPATH REPORT: NORMAL

## 2019-03-12 ENCOUNTER — RECORDS - HEALTHEAST (OUTPATIENT)
Dept: ADMINISTRATIVE | Facility: OTHER | Age: 34
End: 2019-03-12

## 2019-03-15 ENCOUNTER — HOSPITAL ENCOUNTER (OUTPATIENT)
Dept: MRI IMAGING | Facility: CLINIC | Age: 34
Discharge: HOME | End: 2019-03-15
Attending: PSYCHIATRY & NEUROLOGY

## 2019-03-15 DIAGNOSIS — G43.009 MIGRAINE WITHOUT AURA: ICD-10-CM

## 2019-03-21 ENCOUNTER — RECORDS - HEALTHEAST (OUTPATIENT)
Dept: ADMINISTRATIVE | Facility: OTHER | Age: 34
End: 2019-03-21

## 2019-03-22 ENCOUNTER — HOSPITAL ENCOUNTER (OUTPATIENT)
Dept: INTERVENTIONAL RADIOLOGY/VASCULAR | Facility: CLINIC | Age: 34
Discharge: HOME | End: 2019-03-22
Attending: PSYCHIATRY & NEUROLOGY | Admitting: RADIOLOGY

## 2019-03-22 DIAGNOSIS — G43.009 MIGRAINE WITHOUT AURA: ICD-10-CM

## 2019-03-22 LAB
APPEARANCE CSF: CLEAR
COLOR CSF: COLORLESS
GLUCOSE CSF-MCNC: 70 MG/DL (ref 40–75)
GRAM STAIN RESULT: NORMAL
GRAM STAIN RESULT: NORMAL
PROT CSF-MCNC: 30 MG/DL (ref 15–45)
RBC # CSF MANUAL: 1 /UL
TUBE # CSF: 3
VOLUME CSF - HISTORICAL: 3 ML
WBC # CSF MANUAL: 3 /UL

## 2019-03-25 LAB — BACTERIA SPEC CULT: NO GROWTH

## 2019-04-01 ENCOUNTER — AMBULATORY - HEALTHEAST (OUTPATIENT)
Dept: INTENSIVE CARE | Facility: CLINIC | Age: 34
End: 2019-04-01

## 2019-04-01 DIAGNOSIS — G43.009 MIGRAINE WITHOUT AURA AND WITHOUT STATUS MIGRAINOSUS, NOT INTRACTABLE: ICD-10-CM

## 2021-05-26 NOTE — PROCEDURES
[facility] Radiology Post Procedure    Procedure: lumbar puncture    Radiologist: Adriel Norman    Contrast: 0 mL Omnipaque  Fluoro Time: 0.2 minutes  Number of images: 1    EBL: minimal  Complications: none    Preliminary findings: (see dictation for full detail)  Technically successful L5-S1 lumbar puncture.   15 cc clear CSF.    Assess/Plan:  Routine post procedure monitoring   Bedrest for 1-2 hours  CSF sent to lab      Adriel Norman

## 2022-01-22 ENCOUNTER — HEALTH MAINTENANCE LETTER (OUTPATIENT)
Age: 37
End: 2022-01-22

## 2022-02-07 ENCOUNTER — TRANSCRIBE ORDERS (OUTPATIENT)
Dept: MATERNAL FETAL MEDICINE | Facility: HOSPITAL | Age: 37
End: 2022-02-07
Payer: COMMERCIAL

## 2022-02-07 ENCOUNTER — TELEPHONE (OUTPATIENT)
Dept: MATERNAL FETAL MEDICINE | Facility: HOSPITAL | Age: 37
End: 2022-02-07
Payer: COMMERCIAL

## 2022-02-07 DIAGNOSIS — O26.90 PREGNANCY RELATED CONDITION, ANTEPARTUM: Primary | ICD-10-CM

## 2022-02-07 NOTE — TELEPHONE ENCOUNTER
"Called Melissa regarding the referral we received from [facility]. Patient has an abnormal cell free fetal DNA test/NIPT. More specifically her Panorama results indicated a low fetal fraction and an increased risk (1 in 17 risk) for [id number]/triploidy.     Melissa shared with me that is an IVF pregnancy, and the embryo had preimplantation genetic testing for aneuploidy (PGT-A) prior to transfer. Melissa reports PGT-A results were consistent with a normal euploid male embryo result.    The patient had a Non-Invasive Prenatal Test (NIPT) (Panoram through [facility]) earlier in pregnancy.  The results are \"Abnormal, increased risk for trisomy 18, trisomy 13, or triploidy.\"  This result was discussed with the staff at [facility] and they report that this result is caused by a low fetal fraction test result that then fails a secondary round of Bayesian analysis to assess for causes of low fetal fraction other than chromosome abnormalities.  Low fetal fraction is one of the more common causes for a \"no result\" result from NIPT, and it has been shown that these \"no call\" results are actually at increased risk for having chromosome abnormalities (anywhere from 3-20% increased risk).  [facility] has developed a second round of analysis based on internal data that they apply to their low fetal fraction cases to sort tests into two categories, either a low fetal fraction that has an identifiable cause, such as high maternal BMI, early gestational age at draw, specific maternal medication use or maternal health concerns (ie. Maternal autoimmune disorder), etc, that may benefit from retesting, or a low fetal fraction that is not explained by any of those other factors, indicating an increased risk for an underlying genetic cause for the low fetal fraction.     A no call result that may not be explained by available information, suggesting an increased risk for chromosome abnormalities trisomy 18, trisomy 13, or triploidy.  [facility] " reports that in this specific result population, the overall risks for a chromosome abnormality in Melissa's pregnancy is 1/17 or ~6%.  Conversely, this also means there is a ~94% chance that Melissa's result is a false positive screen result.     I discussed the option of a nuchal translucency ultrasound and attempting to repeat the Panorama or try a different cell free fetal/NIPT test platform. We discussed level II ultrasound screening for congenital anomalies associated with these conditions. The patient was also offered the option of invasive diagnostic testing ([facility] and amniocentesis) and the timelive for testing was reviewed. After reviewing these options, Melissa has elected e to come in for GC visit and NT ultrasound. She would prefer to repeat the testing through a different lab ([facility]) to screen for aneuploidy. We discussed that [facility] does not screen triploidy but will screen for Down syndrome, trisomy 18, trisomy 13, and sex chromosome aneuploidy. Patient verbalized understand of plan. [facility]  will call patient to schedule.    Mary Malik MS, [facility]  Maternal Fetal Medicine  [facility]  Phone:[phone]

## 2022-02-09 ENCOUNTER — PRE VISIT (OUTPATIENT)
Dept: MATERNAL FETAL MEDICINE | Facility: HOSPITAL | Age: 37
End: 2022-02-09
Payer: COMMERCIAL

## 2022-02-11 ENCOUNTER — OFFICE VISIT (OUTPATIENT)
Dept: MATERNAL FETAL MEDICINE | Facility: HOSPITAL | Age: 37
End: 2022-02-11
Attending: OBSTETRICS & GYNECOLOGY
Payer: COMMERCIAL

## 2022-02-11 ENCOUNTER — ANCILLARY PROCEDURE (OUTPATIENT)
Dept: ULTRASOUND IMAGING | Facility: HOSPITAL | Age: 37
End: 2022-02-11
Attending: OBSTETRICS & GYNECOLOGY
Payer: COMMERCIAL

## 2022-02-11 ENCOUNTER — LAB (OUTPATIENT)
Dept: LAB | Facility: HOSPITAL | Age: 37
End: 2022-02-11
Attending: OBSTETRICS & GYNECOLOGY
Payer: COMMERCIAL

## 2022-02-11 DIAGNOSIS — O28.0 ABNORMAL MATERNAL SERUM SCREENING TEST: Primary | ICD-10-CM

## 2022-02-11 DIAGNOSIS — O26.90 PREGNANCY RELATED CONDITION, ANTEPARTUM: ICD-10-CM

## 2022-02-11 DIAGNOSIS — O09.511 SUPERVISION OF ELDERLY PRIMIGRAVIDA IN FIRST TRIMESTER: Primary | ICD-10-CM

## 2022-02-11 DIAGNOSIS — O35.9XX0 SUSPECTED FETAL ANOMALY, ANTEPARTUM, SINGLE OR UNSPECIFIED FETUS: ICD-10-CM

## 2022-02-11 DIAGNOSIS — O09.511 SUPERVISION OF ELDERLY PRIMIGRAVIDA IN FIRST TRIMESTER: ICD-10-CM

## 2022-02-11 PROCEDURE — 76813 OB US NUCHAL MEAS 1 GEST: CPT | Mod: 26 | Performed by: OBSTETRICS & GYNECOLOGY

## 2022-02-11 PROCEDURE — 99202 OFFICE O/P NEW SF 15 MIN: CPT | Mod: 25 | Performed by: OBSTETRICS & GYNECOLOGY

## 2022-02-11 PROCEDURE — 96040 HC GENETIC COUNSELING, EACH 30 MINUTES: CPT | Performed by: GENETIC COUNSELOR, MS

## 2022-02-11 PROCEDURE — 36415 COLL VENOUS BLD VENIPUNCTURE: CPT

## 2022-02-11 PROCEDURE — 76813 OB US NUCHAL MEAS 1 GEST: CPT

## 2022-02-11 NOTE — PROGRESS NOTES
The patient was seen for an ultrasound in the Maternal-Fetal Medicine Center at the [facility] today.  For a detailed report of the ultrasound examination, please see the ultrasound report which can be found under the imaging tab.    Linda Stokes MD  , OB/GYN  Maternal-Fetal Medicine  [phone] (Pager)

## 2022-02-11 NOTE — PROGRESS NOTES
[facility]  Genetic Counseling Consult    Patient: Melissa Albert YOB: 1985   Date of Service:  22      Melissa Albert was seen at the [facility] for genetic consultation given abnormal NIPT (Panorama) results.      Impression/Plan:   I met with Melissa and Zeb to discuss her low fetal fraction/increased risk for trisomy 18/trisomy 13/triploidy on her Panorama results. A no call result that may not be explained by available information, suggesting an increased risk for chromosome abnormalities trisomy 18, trisomy 13, or triploidy.  [facility] reports that in this specific result population, the overall risks for a chromosome abnormality in Melissa's pregnancy is 1/17 or ~6%.  Conversely, this also means there is a ~94% chance that Melissa's result is a false positive screen result.      I discussed the option of a nuchal translucency ultrasound and attempting to repeat the Panorama or try a different cell free fetal/NIPT test platform. The limitation of switching testing platforms is that the [facility] NIPT will not screen for triploidy. We discussed level II ultrasound screening for congenital anomalies associated with these conditions. The patient was also offered the option of invasive diagnostic testing (CVS and amniocentesis) and the timeline for testing was reviewed. After reviewing these options, Melissa has elected to proceed with NT ultrasound. She would prefer to repeat the testing through a different lab ([facility]) to screen for aneuploidy. We discussed that [facility] does not screen triploidy but will screen for Down syndrome, trisomy 18, trisomy 13, and sex chromosome aneuploidy.    Pregnancy History:   /Parity:                            Age at Delivery: 37 year old  BIJU: 2022, by Est. Date of Conception                  Gestational Age: 13w1d  -  No significant complications or exposures were reported in the current  pregnancy.  This pregnancy was achieved via IVF using self donors. Melissa reports that embryo had PGT-A testing and is a normal euploid male embryo. Embryo transferred occurred on 2021 and single gestation was identified on early ultrasound.        Family History:   A three-generation pedigree was obtained, and is scanned under the  Media  tab.   The reported family history is negative for multiple miscarriages, stillbirths, birth defects, mental retardation, known genetic conditions, and consanguinity.       Carrier Screening:   The patient reports that she and the father of the pregnancy have  ancestry:      Cystic fibrosis is an autosomal recessive genetic condition that occurs with increased frequency in individuals of  ancestry and carrier screening for this condition is available.  In addition,  screening in the [facility] includes cystic fibrosis.        Expanded carrier screening for mutations in a large panel of genes associated with autosomal recessive conditions including cystic fibrosis, spinal muscular atrophy, and others, is now available.      The patient has had previous carrier screening through [facility], the results of which were negative for 14 conditions.  A copy of the report was not available for our review today.       Risk Assessment:   We explained that the risk for fetal chromosome abnormalities increases with maternal age. We discussed specific features of common chromosome abnormalities, including Down syndrome, trisomy 13, trisomy 18, and sex chromosome trisomies.      At age 36 at egg retrieval, the midtrimester risk to have a baby with Down syndrome is 1 in 216.     At age 36 at egg retrieval, the midtrimester risk to have a baby with any chromosome abnormality is 1 in 105.   Called Melissa regarding the referral we received from [employer]. Patient has an abnormal cell free fetal DNA test/NIPT. More specifically her Panorama results indicated a low  "fetal fraction and an increased risk (1 in 17 risk) for trisomy 18/trisomy 13/triploidy.      Melissa shared with me that is an IVF pregnancy, and the embryo had preimplantation genetic testing for aneuploidy (PGT-A) prior to transfer. Melissa reports PGT-A results were consistent with a normal euploid male embryo result.    The patient had a Non-Invasive Prenatal Test (NIPT) (Panorama through [facility]) earlier in pregnancy.  The results are \"Abnormal, increased risk for trisomy 18, trisomy 13, or triploidy.\"  This result was discussed with the staff at [facility] and they report that this result is caused by a low fetal fraction test result that then fails a secondary round of Bayesian analysis to assess for causes of low fetal fraction other than chromosome abnormalities.  Low fetal fraction is one of the more common causes for a \"no result\" result from NIPT, and it has been shown that these \"no call\" results are actually at increased risk for having chromosome abnormalities (anywhere from 3-20% increased risk).  [facility] has developed a second round of analysis based on internal data that they apply to their low fetal fraction cases to sort tests into two categories, either a low fetal fraction that has an identifiable cause, such as high maternal BMI, early gestational age at draw, specific maternal medication use or maternal health concerns (ie. Maternal autoimmune disorder), etc, that may benefit from retesting, or a low fetal fraction that is not explained by any of those other factors, indicating an increased risk for an underlying genetic cause for the low fetal fraction.      A no call result that may not be explained by available information, suggesting an increased risk for chromosome abnormalities trisomy 18, trisomy 13, or triploidy.  [facility] reports that in this specific result population, the overall risks for a chromosome abnormality in Melissa's pregnancy is 1/17 or ~6%.  Conversely, this also means there is a ~94% " chance that Melissa's result is a false positive screen result.      I discussed the option of a nuchal translucency ultrasound and attempting to repeat the Panorama or try a different cell free fetal/NIPT test platform. The limitation of switching testing platforms is that the [facility] NIPT will not screen for triploidy. We discussed level II ultrasound screening for congenital anomalies associated with these conditions. The patient was also offered the option of invasive diagnostic testing (CVS and amniocentesis) and the timeline for testing was reviewed. After reviewing these options, Melissa has elected to proceed with NT ultrasound. She would prefer to repeat the testing through a different lab ([facility]) to screen for aneuploidy. We discussed that [facility] does not screen triploidy but will screen for Down syndrome, trisomy 18, trisomy 13, and sex chromosome aneuploidy.      Testing Options:   We discussed the following options:   First trimester NT ultrasound    First trimester ultrasound with nuchal translucency and nasal bone assessments Screens for fetal trisomy 21, trisomy 13, and trisomy 18     Non-invasive Prenatal Testing (NIPT)    Maternal plasma cell-free DNA testing; first trimester ultrasound with nuchal translucency and nasal bone assessment is recommended, when appropriate    Screens for fetal trisomy 21, trisomy 13, trisomy 18, and sex chromosome aneuploidy    Cannot screen for open neural tube defects; maternal serum AFP after 15 weeks is recommended     Genetic Amniocentesis    Invasive procedure typically performed in the second trimester by which amniotic fluid is obtained for the purpose of chromosome analysis and/or other prenatal genetic analysis    Diagnostic results; >99% sensitivity for fetal chromosome abnormalities    AFAFP measurement tests for open neural tube defects     Comprehensive (Level II) ultrasound: Detailed ultrasound performed between 18-22 weeks gestation to screen for major birth  defects and markers for aneuploidy.    We reviewed the benefits and limitations of this testing.  Screening tests provide a risk assessment specific to the pregnancy for certain fetal chromosome abnormalities, but cannot definitively diagnose or exclude a fetal chromosome abnormality.  Follow-up genetic counseling and consideration of diagnostic testing is recommended with any abnormal screening result.     Diagnostic tests carry inherent risks- including risk of miscarriage- that require careful consideration.  These tests can detect fetal chromosome abnormalities with greater than 99% certainty.  Results can be compromised by maternal cell contamination or mosaicism, and are limited by the resolution of cytogenetic G-banding technology.  There is no screening nor diagnostic test that can detect all forms of birth defects or mental disability.    It was a pleasure to be involved with Melissa s care. Face-to-face time of the meeting was 30 minutes.  Mary Malik MS, [facility]  Maternal Fetal Medicine  [facility]  Phone:[phone]

## 2022-02-25 ENCOUNTER — TELEPHONE (OUTPATIENT)
Dept: MATERNAL FETAL MEDICINE | Facility: HOSPITAL | Age: 37
End: 2022-02-25
Payer: COMMERCIAL

## 2022-02-25 ENCOUNTER — TELEPHONE (OUTPATIENT)
Dept: MATERNAL FETAL MEDICINE | Facility: CLINIC | Age: 37
End: 2022-02-25
Payer: COMMERCIAL

## 2022-02-25 DIAGNOSIS — O09.511 SUPERVISION OF ELDERLY PRIMIGRAVIDA IN FIRST TRIMESTER: Primary | ICD-10-CM

## 2022-02-25 LAB — SCANNED LAB RESULT: NORMAL

## 2022-02-25 NOTE — TELEPHONE ENCOUNTER
Called patient to discuss failed NIPT at [facility]. Patient schedule for 2/3 complete on 3/7/22 - wanted another attempt while we wait for 2/3 complete.    I spoke with [facility] and a second attempt is NOT recommended since Melissa has already had a two failed NIPTs with [facility] (Kerri).  Recommendation was to move forward with US and consider amnio.      Information was provided to Melissa in  and she was provided my callback number.    Mary Malik MS, [facility]  Maternal Fetal Medicine  [facility]  Phone:[phone]

## 2022-02-28 ENCOUNTER — TELEPHONE (OUTPATIENT)
Dept: MATERNAL FETAL MEDICINE | Facility: HOSPITAL | Age: 37
End: 2022-02-28
Payer: COMMERCIAL

## 2022-02-28 NOTE — TELEPHONE ENCOUNTER
Spoke with patient regarding her 3rd failed NIPT result. It is not recommended that she repeats [facility] NIPT since she has had two failed Panorama results and now a failed [facility] NIPT result.     Patient will plan to wait for her 2/3 complete US on 3/7/22 and consider amnio at that appointment.    Mary Malik MS, [facility]  Maternal Fetal Medicine  [facility]  Phone:[phone]

## 2022-03-02 ENCOUNTER — APPOINTMENT (OUTPATIENT)
Dept: URBAN - METROPOLITAN AREA CLINIC 260 | Age: 37
Setting detail: DERMATOLOGY
End: 2022-03-03

## 2022-03-02 VITALS — HEIGHT: 63 IN | WEIGHT: 217 LBS

## 2022-03-02 DIAGNOSIS — B07.8 OTHER VIRAL WARTS: ICD-10-CM

## 2022-03-02 PROCEDURE — OTHER LIQUID NITROGEN: OTHER

## 2022-03-02 PROCEDURE — OTHER MIPS QUALITY: OTHER

## 2022-03-02 PROCEDURE — OTHER COUNSELING: OTHER

## 2022-03-02 PROCEDURE — 17110 DESTRUCT B9 LESION 1-14: CPT

## 2022-03-02 ASSESSMENT — LOCATION DETAILED DESCRIPTION DERM
LOCATION DETAILED: RIGHT LATERAL PLANTAR HEEL
LOCATION DETAILED: RIGHT LATERAL PLANTAR MIDFOOT

## 2022-03-02 ASSESSMENT — LOCATION SIMPLE DESCRIPTION DERM: LOCATION SIMPLE: RIGHT PLANTAR SURFACE

## 2022-03-02 ASSESSMENT — LOCATION ZONE DERM: LOCATION ZONE: FEET

## 2022-03-02 NOTE — PROCEDURE: LIQUID NITROGEN
Medical Necessity Information: It is in your best interest to select a reason for this procedure from the list below. All of these items fulfill various CMS LCD requirements except the new and changing color options.
Show Applicator Variable?: Yes
Include Z78.9 (Other Specified Conditions Influencing Health Status) As An Associated Diagnosis?: No
Spray Paint Text: The liquid nitrogen was applied to the skin utilizing a spray paint frosting technique.
Number Of Freeze-Thaw Cycles: 2 freeze-thaw cycles
Detail Level: Detailed
Post-Care Instructions: I reviewed with the patient in detail post-care instructions. Patient is to wear sunprotection, and avoid picking at any of the treated lesions. Pt may apply Vaseline to crusted or scabbing areas.
Consent: The patient's consent was obtained including but not limited to risks of crusting, scabbing, blistering, scarring, darker or lighter pigmentary change, recurrence, incomplete removal and infection.
Duration Of Freeze Thaw-Cycle (Seconds): 5-10
Medical Necessity Clause: This procedure was medically necessary because the lesions that were treated were:

## 2022-03-03 ENCOUNTER — TRANSFERRED RECORDS (OUTPATIENT)
Dept: HEALTH INFORMATION MANAGEMENT | Facility: CLINIC | Age: 37
End: 2022-03-03
Payer: COMMERCIAL

## 2022-03-03 VITALS — HEIGHT: 63 IN | WEIGHT: 217 LBS

## 2022-03-07 ENCOUNTER — TELEPHONE (OUTPATIENT)
Dept: MATERNAL FETAL MEDICINE | Facility: CLINIC | Age: 37
End: 2022-03-07
Payer: COMMERCIAL

## 2022-03-07 ENCOUNTER — OFFICE VISIT (OUTPATIENT)
Dept: MATERNAL FETAL MEDICINE | Facility: HOSPITAL | Age: 37
End: 2022-03-07
Attending: OBSTETRICS & GYNECOLOGY
Payer: COMMERCIAL

## 2022-03-07 ENCOUNTER — ANCILLARY PROCEDURE (OUTPATIENT)
Dept: ULTRASOUND IMAGING | Facility: HOSPITAL | Age: 37
End: 2022-03-07
Attending: OBSTETRICS & GYNECOLOGY
Payer: COMMERCIAL

## 2022-03-07 DIAGNOSIS — O99.212 OBESITY AFFECTING PREGNANCY IN SECOND TRIMESTER: ICD-10-CM

## 2022-03-07 DIAGNOSIS — O28.0 ABNORMAL MATERNAL SERUM SCREENING TEST: ICD-10-CM

## 2022-03-07 DIAGNOSIS — O09.519 AMA (ADVANCED MATERNAL AGE) PRIMIGRAVIDA 35+, UNSPECIFIED TRIMESTER: ICD-10-CM

## 2022-03-07 DIAGNOSIS — O28.0 ABNORMAL MATERNAL SERUM SCREENING TEST: Primary | ICD-10-CM

## 2022-03-07 PROCEDURE — 76805 OB US >/= 14 WKS SNGL FETUS: CPT | Mod: 26 | Performed by: OBSTETRICS & GYNECOLOGY

## 2022-03-07 PROCEDURE — 76805 OB US >/= 14 WKS SNGL FETUS: CPT

## 2022-03-07 PROCEDURE — 99207 PR NO CHARGE LOS: CPT | Performed by: OBSTETRICS & GYNECOLOGY

## 2022-03-07 NOTE — PROGRESS NOTES
Melissa Albert was seen for an ultrasound today at the M[facility]Fetal Medicine center.      For the details of the ultrasound please see the report which can be found under the imaging tab.      Sarah Stearns MD  , OB/GYN  Maternal-Fetal Medicine  [email or web address]  [phone] (Main M Office)  [id number] or [phone] (for 24 hour MFM questions)  [phone] (Pager)

## 2022-03-07 NOTE — TELEPHONE ENCOUNTER
3/7/2022    Called Melissa to discuss her upcoming appointment in [facility] for 2/3 early comprehensive anatomy scan and possible amniocentesis.  Unfortunately, due to an unexpected staffing shortage, we will not be able to perform the amniocentesis at our [facility] today if Melissa decides to proceed with diagnostic testing.  However, we discussed that she could still come for the ultrasound today and if needed an amniocentesis could be coordinated at a later time, or we could reschedule her appointments today to another [facility] site in the [facility] that would be able to perform amniocentesis if desired.  Melissa chose to come to her scheduled ultrasound at our [facility] today understanding amniocentesis will not be a care option today.  She reports she feels it is unlikely she would choose to pursue amnio unless significant congenital anomalies are noted on her scan.     Fransisco Naik MS, [facility]  Licensed Genetic Counselor  Phone: [phone]  Pager: [phone]

## 2022-03-16 ENCOUNTER — APPOINTMENT (OUTPATIENT)
Dept: URBAN - METROPOLITAN AREA CLINIC 260 | Age: 37
Setting detail: DERMATOLOGY
End: 2022-03-18

## 2022-03-16 DIAGNOSIS — B07.8 OTHER VIRAL WARTS: ICD-10-CM

## 2022-03-16 DIAGNOSIS — L70.8 OTHER ACNE: ICD-10-CM

## 2022-03-16 PROCEDURE — OTHER LIQUID NITROGEN: OTHER

## 2022-03-16 PROCEDURE — OTHER MIPS QUALITY: OTHER

## 2022-03-16 PROCEDURE — OTHER PRESCRIPTION: OTHER

## 2022-03-16 PROCEDURE — OTHER COUNSELING: OTHER

## 2022-03-16 PROCEDURE — 99214 OFFICE O/P EST MOD 30 MIN: CPT | Mod: 25

## 2022-03-16 PROCEDURE — 17110 DESTRUCT B9 LESION 1-14: CPT

## 2022-03-16 RX ORDER — AZELAIC ACID 0.15 G/G
GEL TOPICAL BID
Qty: 50 | Refills: 1 | Status: ERX | COMMUNITY
Start: 2022-03-16

## 2022-03-16 ASSESSMENT — SEVERITY ASSESSMENT OVERALL AMONG ALL PATIENTS
IN YOUR EXPERIENCE, AMONG ALL PATIENTS YOU HAVE SEEN WITH THIS CONDITION, HOW SEVERE IS THIS PATIENT'S CONDITION?: MULTIPLE INFLAMMATORY LESIONS BUT NONINFLAMMATORY LESIONS PREDOMINATE

## 2022-03-16 ASSESSMENT — LOCATION ZONE DERM
LOCATION ZONE: FEET
LOCATION ZONE: FACE

## 2022-03-16 ASSESSMENT — LOCATION SIMPLE DESCRIPTION DERM
LOCATION SIMPLE: RIGHT CHEEK
LOCATION SIMPLE: RIGHT PLANTAR SURFACE

## 2022-03-16 ASSESSMENT — LOCATION DETAILED DESCRIPTION DERM
LOCATION DETAILED: RIGHT SUPERIOR MEDIAL BUCCAL CHEEK
LOCATION DETAILED: RIGHT LATERAL PLANTAR MIDFOOT
LOCATION DETAILED: RIGHT LATERAL PLANTAR HEEL

## 2022-03-16 NOTE — PROCEDURE: COUNSELING
Sarecycline Pregnancy And Lactation Text: This medication is Pregnancy Category D and not consider safe during pregnancy. It is also excreted in breast milk.
High Dose Vitamin A Counseling: Side effects reviewed, pt to contact office should one occur.
Spironolactone Pregnancy And Lactation Text: This medication can cause feminization of the male fetus and should be avoided during pregnancy. The active metabolite is also found in breast milk.
Tetracycline Counseling: Patient counseled regarding possible photosensitivity and increased risk for sunburn.  Patient instructed to avoid sunlight, if possible.  When exposed to sunlight, patients should wear protective clothing, sunglasses, and sunscreen.  The patient was instructed to call the office immediately if the following severe adverse effects occur:  hearing changes, easy bruising/bleeding, severe headache, or vision changes.  The patient verbalized understanding of the proper use and possible adverse effects of tetracycline.  All of the patient's questions and concerns were addressed. Patient understands to avoid pregnancy while on therapy due to potential birth defects.
Benzoyl Peroxide Pregnancy And Lactation Text: This medication is Pregnancy Category C. It is unknown if benzoyl peroxide is excreted in breast milk.
Topical Retinoid Pregnancy And Lactation Text: This medication is Pregnancy Category C. It is unknown if this medication is excreted in breast milk.
Topical Sulfur Applications Counseling: Topical Sulfur Counseling: Patient counseled that this medication may cause skin irritation or allergic reactions.  In the event of skin irritation, the patient was advised to reduce the amount of the drug applied or use it less frequently.   The patient verbalized understanding of the proper use and possible adverse effects of topical sulfur application.  All of the patient's questions and concerns were addressed.
Winlevi Pregnancy And Lactation Text: This medication is considered safe during pregnancy and breastfeeding.
Winlevi Counseling:  I discussed with the patient the risks of topical clascoterone including but not limited to erythema, scaling, itching, and stinging. Patient voiced their understanding.
Azithromycin Pregnancy And Lactation Text: This medication is considered safe during pregnancy and is also secreted in breast milk.
Dapsone Counseling: I discussed with the patient the risks of dapsone including but not limited to hemolytic anemia, agranulocytosis, rashes, methemoglobinemia, kidney failure, peripheral neuropathy, headaches, GI upset, and liver toxicity.  Patients who start dapsone require monitoring including baseline LFTs and weekly CBCs for the first month, then every month thereafter.  The patient verbalized understanding of the proper use and possible adverse effects of dapsone.  All of the patient's questions and concerns were addressed.
Topical Retinoid counseling:  Patient advised to apply a pea-sized amount only at bedtime and wait 30 minutes after washing their face before applying.  If too drying, patient may add a non-comedogenic moisturizer. The patient verbalized understanding of the proper use and possible adverse effects of retinoids.  All of the patient's questions and concerns were addressed.
Detail Level: Zone
Birth Control Pills Counseling: Birth Control Pill Counseling: I discussed with the patient the potential side effects of OCPs including but not limited to increased risk of stroke, heart attack, thrombophlebitis, deep venous thrombosis, hepatic adenomas, breast changes, GI upset, headaches, and depression.  The patient verbalized understanding of the proper use and possible adverse effects of OCPs. All of the patient's questions and concerns were addressed.
Dapsone Pregnancy And Lactation Text: This medication is Pregnancy Category C and is not considered safe during pregnancy or breast feeding.
Bactrim Counseling:  I discussed with the patient the risks of sulfa antibiotics including but not limited to GI upset, allergic reaction, drug rash, diarrhea, dizziness, photosensitivity, and yeast infections.  Rarely, more serious reactions can occur including but not limited to aplastic anemia, agranulocytosis, methemoglobinemia, blood dyscrasias, liver or kidney failure, lung infiltrates or desquamative/blistering drug rashes.
Doxycycline Counseling:  Patient counseled regarding possible photosensitivity and increased risk for sunburn.  Patient instructed to avoid sunlight, if possible.  When exposed to sunlight, patients should wear protective clothing, sunglasses, and sunscreen.  The patient was instructed to call the office immediately if the following severe adverse effects occur:  hearing changes, easy bruising/bleeding, severe headache, or vision changes.  The patient verbalized understanding of the proper use and possible adverse effects of doxycycline.  All of the patient's questions and concerns were addressed.
Sarecycline Counseling: Patient advised regarding possible photosensitivity and discoloration of the teeth, skin, lips, tongue and gums.  Patient instructed to avoid sunlight, if possible.  When exposed to sunlight, patients should wear protective clothing, sunglasses, and sunscreen.  The patient was instructed to call the office immediately if the following severe adverse effects occur:  hearing changes, easy bruising/bleeding, severe headache, or vision changes.  The patient verbalized understanding of the proper use and possible adverse effects of sarecycline.  All of the patient's questions and concerns were addressed.
Tazorac Counseling:  Patient advised that medication is irritating and drying.  Patient may need to apply sparingly and wash off after an hour before eventually leaving it on overnight.  The patient verbalized understanding of the proper use and possible adverse effects of tazorac.  All of the patient's questions and concerns were addressed.
Doxycycline Pregnancy And Lactation Text: This medication is Pregnancy Category D and not consider safe during pregnancy. It is also excreted in breast milk but is considered safe for shorter treatment courses.
Erythromycin Pregnancy And Lactation Text: This medication is Pregnancy Category B and is considered safe during pregnancy. It is also excreted in breast milk.
Topical Clindamycin Pregnancy And Lactation Text: This medication is Pregnancy Category B and is considered safe during pregnancy. It is unknown if it is excreted in breast milk.
Minocycline Counseling: Patient advised regarding possible photosensitivity and discoloration of the teeth, skin, lips, tongue and gums.  Patient instructed to avoid sunlight, if possible.  When exposed to sunlight, patients should wear protective clothing, sunglasses, and sunscreen.  The patient was instructed to call the office immediately if the following severe adverse effects occur:  hearing changes, easy bruising/bleeding, severe headache, or vision changes.  The patient verbalized understanding of the proper use and possible adverse effects of minocycline.  All of the patient's questions and concerns were addressed.
Topical Sulfur Applications Pregnancy And Lactation Text: This medication is Pregnancy Category C and has an unknown safety profile during pregnancy. It is unknown if this topical medication is excreted in breast milk.
Use Enhanced Medication Counseling?: No
Azelaic Acid Pregnancy And Lactation Text: This medication is considered safe during pregnancy and breast feeding.
Tazorac Pregnancy And Lactation Text: This medication is not safe during pregnancy. It is unknown if this medication is excreted in breast milk.
Bactrim Pregnancy And Lactation Text: This medication is Pregnancy Category D and is known to cause fetal risk.  It is also excreted in breast milk.
Benzoyl Peroxide Counseling: Patient counseled that medicine may cause skin irritation and bleach clothing.  In the event of skin irritation, the patient was advised to reduce the amount of the drug applied or use it less frequently.   The patient verbalized understanding of the proper use and possible adverse effects of benzoyl peroxide.  All of the patient's questions and concerns were addressed.
Spironolactone Counseling: Patient advised regarding risks of diarrhea, abdominal pain, hyperkalemia, birth defects (for female patients), liver toxicity and renal toxicity. The patient may need blood work to monitor liver and kidney function and potassium levels while on therapy. The patient verbalized understanding of the proper use and possible adverse effects of spironolactone.  All of the patient's questions and concerns were addressed.
Azithromycin Counseling:  I discussed with the patient the risks of azithromycin including but not limited to GI upset, allergic reaction, drug rash, diarrhea, and yeast infections.
Erythromycin Counseling:  I discussed with the patient the risks of erythromycin including but not limited to GI upset, allergic reaction, drug rash, diarrhea, increase in liver enzymes, and yeast infections.
Topical Clindamycin Counseling: Patient counseled that this medication may cause skin irritation or allergic reactions.  In the event of skin irritation, the patient was advised to reduce the amount of the drug applied or use it less frequently.   The patient verbalized understanding of the proper use and possible adverse effects of clindamycin.  All of the patient's questions and concerns were addressed.
Birth Control Pills Pregnancy And Lactation Text: This medication should be avoided if pregnant and for the first 30 days post-partum.
High Dose Vitamin A Pregnancy And Lactation Text: High dose vitamin A therapy is contraindicated during pregnancy and breast feeding.
Isotretinoin Counseling: Patient should get monthly blood tests, not donate blood, not drive at night if vision affected, not share medication, and not undergo elective surgery for 6 months after tx completed. Side effects reviewed, pt to contact office should one occur.
Isotretinoin Pregnancy And Lactation Text: This medication is Pregnancy Category X and is considered extremely dangerous during pregnancy. It is unknown if it is excreted in breast milk.
Azelaic Acid Counseling: Patient counseled that medicine may cause skin irritation and to avoid applying near the eyes.  In the event of skin irritation, the patient was advised to reduce the amount of the drug applied or use it less frequently.   The patient verbalized understanding of the proper use and possible adverse effects of azelaic acid.  All of the patient's questions and concerns were addressed.

## 2022-03-16 NOTE — PROCEDURE: LIQUID NITROGEN
Medical Necessity Information: It is in your best interest to select a reason for this procedure from the list below. All of these items fulfill various CMS LCD requirements except the new and changing color options.
Show Applicator Variable?: Yes
Number Of Freeze-Thaw Cycles: 2 freeze-thaw cycles
Render Note In Bullet Format When Appropriate: No
Post-Care Instructions: I reviewed with the patient in detail post-care instructions. Patient is to wear sunprotection, and avoid picking at any of the treated lesions. Pt may apply Vaseline to crusted or scabbing areas.
Detail Level: Detailed
Consent: The patient's consent was obtained including but not limited to risks of crusting, scabbing, blistering, scarring, darker or lighter pigmentary change, recurrence, incomplete removal and infection.
Duration Of Freeze Thaw-Cycle (Seconds): 5-10
Medical Necessity Clause: This procedure was medically necessary because the lesions that were treated were:
Spray Paint Text: The liquid nitrogen was applied to the skin utilizing a spray paint frosting technique.

## 2022-03-28 ENCOUNTER — ANCILLARY PROCEDURE (OUTPATIENT)
Dept: ULTRASOUND IMAGING | Facility: HOSPITAL | Age: 37
End: 2022-03-28
Attending: OBSTETRICS & GYNECOLOGY
Payer: COMMERCIAL

## 2022-03-28 ENCOUNTER — OFFICE VISIT (OUTPATIENT)
Dept: MATERNAL FETAL MEDICINE | Facility: HOSPITAL | Age: 37
End: 2022-03-28
Attending: OBSTETRICS & GYNECOLOGY
Payer: COMMERCIAL

## 2022-03-28 DIAGNOSIS — O28.0 ABNORMAL MATERNAL SERUM SCREENING TEST: ICD-10-CM

## 2022-03-28 DIAGNOSIS — O99.212 OBESITY AFFECTING PREGNANCY IN SECOND TRIMESTER: ICD-10-CM

## 2022-03-28 DIAGNOSIS — O09.519 AMA (ADVANCED MATERNAL AGE) PRIMIGRAVIDA 35+, UNSPECIFIED TRIMESTER: ICD-10-CM

## 2022-03-28 DIAGNOSIS — O35.9XX0 SUSPECTED FETAL ANOMALY, ANTEPARTUM, SINGLE OR UNSPECIFIED FETUS: ICD-10-CM

## 2022-03-28 DIAGNOSIS — O28.0 ABNORMAL MATERNAL SERUM SCREENING TEST: Primary | ICD-10-CM

## 2022-03-28 PROCEDURE — 76811 OB US DETAILED SNGL FETUS: CPT

## 2022-03-28 PROCEDURE — 76811 OB US DETAILED SNGL FETUS: CPT | Mod: 26 | Performed by: OBSTETRICS & GYNECOLOGY

## 2022-03-28 PROCEDURE — 99207 PR NO CHARGE LOS: CPT | Performed by: OBSTETRICS & GYNECOLOGY

## 2022-03-28 NOTE — PROGRESS NOTES
"Please see \"Imaging\" tab under Chart Review for full details.    Nidhi Mcclellan MD  Maternal Fetal Medicine    "

## 2022-03-30 ENCOUNTER — APPOINTMENT (OUTPATIENT)
Dept: URBAN - METROPOLITAN AREA CLINIC 260 | Age: 37
Setting detail: DERMATOLOGY
End: 2022-03-31

## 2022-03-30 DIAGNOSIS — B07.8 OTHER VIRAL WARTS: ICD-10-CM

## 2022-03-30 PROCEDURE — OTHER COUNSELING: OTHER

## 2022-03-30 PROCEDURE — OTHER LIQUID NITROGEN: OTHER

## 2022-03-30 PROCEDURE — 17110 DESTRUCT B9 LESION 1-14: CPT

## 2022-03-30 PROCEDURE — OTHER MIPS QUALITY: OTHER

## 2022-03-30 ASSESSMENT — LOCATION SIMPLE DESCRIPTION DERM: LOCATION SIMPLE: RIGHT PLANTAR SURFACE

## 2022-03-30 ASSESSMENT — LOCATION DETAILED DESCRIPTION DERM
LOCATION DETAILED: RIGHT LATERAL PLANTAR HEEL
LOCATION DETAILED: RIGHT LATERAL PLANTAR MIDFOOT

## 2022-03-30 ASSESSMENT — LOCATION ZONE DERM: LOCATION ZONE: FEET

## 2022-03-30 NOTE — PROCEDURE: LIQUID NITROGEN
Show Aperture Variable?: Yes
Post-Care Instructions: I reviewed with the patient in detail post-care instructions. Patient is to wear sunprotection, and avoid picking at any of the treated lesions. Pt may apply Vaseline to crusted or scabbing areas.
Include Z78.9 (Other Specified Conditions Influencing Health Status) As An Associated Diagnosis?: No
Duration Of Freeze Thaw-Cycle (Seconds): 15-20
Consent: The patient's consent was obtained including but not limited to risks of crusting, scabbing, blistering, scarring, darker or lighter pigmentary change, recurrence, incomplete removal and infection.
Medical Necessity Clause: This procedure was medically necessary because the lesions that were treated were:
Spray Paint Text: The liquid nitrogen was applied to the skin utilizing a spray paint frosting technique.
Medical Necessity Information: It is in your best interest to select a reason for this procedure from the list below. All of these items fulfill various CMS LCD requirements except the new and changing color options.
Detail Level: Detailed
Number Of Freeze-Thaw Cycles: 3 freeze-thaw cycles

## 2022-04-19 ENCOUNTER — HOSPITAL ENCOUNTER (OUTPATIENT)
Dept: CARDIOLOGY | Facility: CLINIC | Age: 37
Discharge: HOME | End: 2022-04-19
Attending: OBSTETRICS & GYNECOLOGY | Admitting: OBSTETRICS & GYNECOLOGY
Payer: COMMERCIAL

## 2022-04-19 ENCOUNTER — OFFICE VISIT (OUTPATIENT)
Dept: PEDIATRIC CARDIOLOGY | Facility: CLINIC | Age: 37
End: 2022-04-19
Payer: COMMERCIAL

## 2022-04-19 DIAGNOSIS — O09.519 AMA (ADVANCED MATERNAL AGE) PRIMIGRAVIDA 35+, UNSPECIFIED TRIMESTER: ICD-10-CM

## 2022-04-19 DIAGNOSIS — O35.BXX0 FETAL CARDIAC DISEASE AFFECTING PREGNANCY, SINGLE OR UNSPECIFIED FETUS: Primary | ICD-10-CM

## 2022-04-19 DIAGNOSIS — O99.212 OBESITY AFFECTING PREGNANCY IN SECOND TRIMESTER: ICD-10-CM

## 2022-04-19 DIAGNOSIS — O28.0 ABNORMAL MATERNAL SERUM SCREENING TEST: ICD-10-CM

## 2022-04-19 PROCEDURE — 93325 DOPPLER ECHO COLOR FLOW MAPG: CPT

## 2022-04-19 PROCEDURE — 93325 DOPPLER ECHO COLOR FLOW MAPG: CPT | Mod: 26 | Performed by: PEDIATRICS

## 2022-04-19 PROCEDURE — 99202 OFFICE O/P NEW SF 15 MIN: CPT | Mod: 25 | Performed by: PEDIATRICS

## 2022-04-19 PROCEDURE — 76825 ECHO EXAM OF FETAL HEART: CPT | Mod: 26 | Performed by: PEDIATRICS

## 2022-04-19 PROCEDURE — 76827 ECHO EXAM OF FETAL HEART: CPT | Mod: 26 | Performed by: PEDIATRICS

## 2022-04-19 NOTE — LETTER
2022      RE: Melissa Albert  [address] [facility] [address]       [facility]   Heart Center Fetal Consult Note    Patient:  Melissa Albert MRN:  [id number]   YOB: 1985 Age:  36 year old   Date of Visit:  2022 PCP:  Pablito Jacobo MD     Dear Doctor,     I had the pleasure of seeing Melissa Albert at the [facility] on 2022 in fetal cardiology consultation for fetal echocardiogram results. She presented today accompanied by her partner. As you know, she is a 36 year old  at 22w5d who presented for fetal echocardiogram today because of IVF pregnancy, incomplete cardiac exam, and micrognathia.    I performed and interpreted the fetal echocardiogram today, which demonstrated likely normal fetal echocardiogram. Normal fetal intracardiac connections. Normal right and left ventricular size and function. Fetal heart rate is regular at 162 bpm. No hydrops.    I reviewed the echo findings today with Melissa Albert. Although this was a technically difficult study, the patient is aware that no obvious cardiac abnormalities were identified. She is aware of the general limitations of fetal echocardiography. No additional fetal echocardiograms are recommended. No  cardiac follow-up is required.     Thank you for allowing me to participate in Melissa's care. Please do not hesitate to contact me with questions or concerns.    This visit was separate from the performance and interpretation of the ultrasound. The majority of the time (>50%) was spent in counseling and coordination of care. I spent approximately 20 minutes in face-to-face time reviewing the above considerations.    Roger Courtney M.D.  Pediatric Cardiology  [address], 5th floor, [facility] [address]  Phone [phone]  Fax [phone]

## 2022-04-19 NOTE — PROGRESS NOTES
[facility]   Heart Center Fetal Consult Note    Patient:  Melissa Albert MRN:  [id number]   YOB: 1985 Age:  36 year old   Date of Visit:  2022 PCP:  Pablito Jacobo MD     Dear Doctor,     I had the pleasure of seeing Melissa Albert at the [facility] on 2022 in fetal cardiology consultation for fetal echocardiogram results. She presented today accompanied by her partner. As you know, she is a 36 year old  at 22w5d who presented for fetal echocardiogram today because of IVF pregnancy, incomplete cardiac exam, and micrognathia.    I performed and interpreted the fetal echocardiogram today, which demonstrated likely normal fetal echocardiogram. Normal fetal intracardiac connections. Normal right and left ventricular size and function. Fetal heart rate is regular at 162 bpm. No hydrops.    I reviewed the echo findings today with Melissa Albert. Although this was a technically difficult study, the patient is aware that no obvious cardiac abnormalities were identified. She is aware of the general limitations of fetal echocardiography. No additional fetal echocardiograms are recommended. No  cardiac follow-up is required.     Thank you for allowing me to participate in Melissa's care. Please do not hesitate to contact me with questions or concerns.    This visit was separate from the performance and interpretation of the ultrasound. The majority of the time (>50%) was spent in counseling and coordination of care. I spent approximately 20 minutes in face-to-face time reviewing the above considerations.    Roger Courtney M.D.  Pediatric Cardiology  [address], 5th floor, [facility] [address]  Phone [phone]  Fax [phone]

## 2022-04-20 ENCOUNTER — APPOINTMENT (OUTPATIENT)
Dept: URBAN - METROPOLITAN AREA CLINIC 260 | Age: 37
Setting detail: DERMATOLOGY
End: 2022-04-21

## 2022-04-20 VITALS — WEIGHT: 217 LBS | HEIGHT: 63 IN

## 2022-04-20 DIAGNOSIS — B07.8 OTHER VIRAL WARTS: ICD-10-CM

## 2022-04-20 PROCEDURE — OTHER LIQUID NITROGEN: OTHER

## 2022-04-20 PROCEDURE — OTHER COUNSELING: OTHER

## 2022-04-20 PROCEDURE — 17110 DESTRUCT B9 LESION 1-14: CPT

## 2022-04-20 PROCEDURE — OTHER MIPS QUALITY: OTHER

## 2022-04-20 ASSESSMENT — LOCATION ZONE DERM: LOCATION ZONE: FEET

## 2022-04-20 ASSESSMENT — LOCATION DETAILED DESCRIPTION DERM
LOCATION DETAILED: RIGHT LATERAL PLANTAR HEEL
LOCATION DETAILED: RIGHT LATERAL PLANTAR MIDFOOT

## 2022-04-20 ASSESSMENT — LOCATION SIMPLE DESCRIPTION DERM: LOCATION SIMPLE: RIGHT PLANTAR SURFACE

## 2022-04-20 NOTE — PROCEDURE: LIQUID NITROGEN
Render Note In Bullet Format When Appropriate: No
Detail Level: Detailed
Consent: The patient's consent was obtained including but not limited to risks of crusting, scabbing, blistering, scarring, darker or lighter pigmentary change, recurrence, incomplete removal and infection.
Number Of Freeze-Thaw Cycles: 3 freeze-thaw cycles
Show Spray Paint Technique Variable?: Yes
Post-Care Instructions: I reviewed with the patient in detail post-care instructions. Patient is to wear sunprotection, and avoid picking at any of the treated lesions. Pt may apply Vaseline to crusted or scabbing areas.
Spray Paint Text: The liquid nitrogen was applied to the skin utilizing a spray paint frosting technique.
Duration Of Freeze Thaw-Cycle (Seconds): 15-20
Medical Necessity Clause: This procedure was medically necessary because the lesions that were treated were:
Medical Necessity Information: It is in your best interest to select a reason for this procedure from the list below. All of these items fulfill various CMS LCD requirements except the new and changing color options.

## 2022-04-28 ENCOUNTER — OFFICE VISIT (OUTPATIENT)
Dept: MATERNAL FETAL MEDICINE | Facility: HOSPITAL | Age: 37
End: 2022-04-28
Attending: OBSTETRICS & GYNECOLOGY
Payer: COMMERCIAL

## 2022-04-28 ENCOUNTER — ANCILLARY PROCEDURE (OUTPATIENT)
Dept: ULTRASOUND IMAGING | Facility: HOSPITAL | Age: 37
End: 2022-04-28
Attending: OBSTETRICS & GYNECOLOGY
Payer: COMMERCIAL

## 2022-04-28 VITALS — SYSTOLIC BLOOD PRESSURE: 136 MMHG | HEART RATE: 98 BPM | DIASTOLIC BLOOD PRESSURE: 83 MMHG

## 2022-04-28 DIAGNOSIS — O99.212 OBESITY AFFECTING PREGNANCY IN SECOND TRIMESTER: ICD-10-CM

## 2022-04-28 DIAGNOSIS — O28.0 ABNORMAL MATERNAL SERUM SCREENING TEST: ICD-10-CM

## 2022-04-28 DIAGNOSIS — O35.AXX0 MICROGNATHIA OF FETUS AFFECTING MANAGEMENT OF MOTHER, ANTEPARTUM: ICD-10-CM

## 2022-04-28 DIAGNOSIS — O09.519 AMA (ADVANCED MATERNAL AGE) PRIMIGRAVIDA 35+, UNSPECIFIED TRIMESTER: ICD-10-CM

## 2022-04-28 DIAGNOSIS — O09.812 PREGNANCY RESULTING FROM IN VITRO FERTILIZATION IN SECOND TRIMESTER: ICD-10-CM

## 2022-04-28 DIAGNOSIS — O99.210 OBESITY IN PREGNANCY: Primary | ICD-10-CM

## 2022-04-28 DIAGNOSIS — O35.9XX0 SUSPECTED FETAL ANOMALY, ANTEPARTUM, SINGLE OR UNSPECIFIED FETUS: ICD-10-CM

## 2022-04-28 PROCEDURE — 76816 OB US FOLLOW-UP PER FETUS: CPT

## 2022-04-28 PROCEDURE — 76816 OB US FOLLOW-UP PER FETUS: CPT | Mod: 26 | Performed by: OBSTETRICS & GYNECOLOGY

## 2022-04-28 PROCEDURE — 99207 PR NO CHARGE LOS: CPT | Performed by: OBSTETRICS & GYNECOLOGY

## 2022-05-13 ENCOUNTER — TRANSFERRED RECORDS (OUTPATIENT)
Dept: HEALTH INFORMATION MANAGEMENT | Facility: CLINIC | Age: 37
End: 2022-05-13
Payer: COMMERCIAL

## 2022-05-18 ENCOUNTER — APPOINTMENT (OUTPATIENT)
Dept: URBAN - METROPOLITAN AREA CLINIC 260 | Age: 37
Setting detail: DERMATOLOGY
End: 2022-05-20

## 2022-05-18 VITALS — HEIGHT: 63 IN | WEIGHT: 237 LBS

## 2022-05-18 DIAGNOSIS — B07.8 OTHER VIRAL WARTS: ICD-10-CM

## 2022-05-18 PROCEDURE — OTHER BENIGN DESTRUCTION: OTHER

## 2022-05-18 PROCEDURE — 17110 DESTRUCT B9 LESION 1-14: CPT

## 2022-05-18 PROCEDURE — OTHER COUNSELING: OTHER

## 2022-05-18 ASSESSMENT — LOCATION DETAILED DESCRIPTION DERM
LOCATION DETAILED: RIGHT PLANTAR FOREFOOT OVERLYING 4TH METATARSAL
LOCATION DETAILED: RIGHT INSTEP
LOCATION DETAILED: RIGHT LATERAL PLANTAR MIDFOOT

## 2022-05-18 ASSESSMENT — LOCATION ZONE DERM: LOCATION ZONE: FEET

## 2022-05-18 ASSESSMENT — LOCATION SIMPLE DESCRIPTION DERM: LOCATION SIMPLE: RIGHT PLANTAR SURFACE

## 2022-05-18 NOTE — PROCEDURE: BENIGN DESTRUCTION
Detail Level: Detailed
Render Post-Care Instructions In Note?: yes
Medical Necessity Information: It is in your best interest to select a reason for this procedure from the list below. All of these items fulfill various CMS LCD requirements except the new and changing color options.
Render Note In Bullet Format When Appropriate: No
Consent: The patient's consent was obtained including but not limited to risks of crusting, scabbing, blistering, scarring, darker or lighter pigmentary change, recurrence, incomplete removal and infection.
Treatment Number (Will Not Render If 0): 0
Anesthesia Volume In Cc: 0.5
Medical Necessity Clause: This procedure was medically necessary because the lesions that were treated were:
Post-Care Instructions: I reviewed with the patient in detail post-care instructions. Patient is to wear sunprotection, and avoid picking at any of the treated lesions. Pt may apply Vaseline to crusted or scabbing areas.

## 2022-05-26 ENCOUNTER — OFFICE VISIT (OUTPATIENT)
Dept: MATERNAL FETAL MEDICINE | Facility: HOSPITAL | Age: 37
End: 2022-05-26
Attending: OBSTETRICS & GYNECOLOGY
Payer: COMMERCIAL

## 2022-05-26 ENCOUNTER — HOSPITAL ENCOUNTER (INPATIENT)
Facility: CLINIC | Age: 37
Setting detail: SURGERY ADMIT
End: 2022-05-26
Attending: OBSTETRICS & GYNECOLOGY | Admitting: OBSTETRICS & GYNECOLOGY
Payer: COMMERCIAL

## 2022-05-26 ENCOUNTER — ANCILLARY PROCEDURE (OUTPATIENT)
Dept: ULTRASOUND IMAGING | Facility: HOSPITAL | Age: 37
End: 2022-05-26
Attending: OBSTETRICS & GYNECOLOGY
Payer: COMMERCIAL

## 2022-05-26 DIAGNOSIS — O35.AXX0 MICROGNATHIA OF FETUS AFFECTING MANAGEMENT OF MOTHER, ANTEPARTUM: ICD-10-CM

## 2022-05-26 DIAGNOSIS — O35.AXX0 MICROGNATHIA OF FETUS AFFECTING MANAGEMENT OF MOTHER, ANTEPARTUM: Primary | ICD-10-CM

## 2022-05-26 DIAGNOSIS — O28.0 ABNORMAL MATERNAL SERUM SCREENING TEST: ICD-10-CM

## 2022-05-26 DIAGNOSIS — O40.3XX0 POLYHYDRAMNIOS IN THIRD TRIMESTER COMPLICATION, SINGLE OR UNSPECIFIED FETUS: ICD-10-CM

## 2022-05-26 DIAGNOSIS — O09.519 AMA (ADVANCED MATERNAL AGE) PRIMIGRAVIDA 35+, UNSPECIFIED TRIMESTER: ICD-10-CM

## 2022-05-26 DIAGNOSIS — O09.812 PREGNANCY RESULTING FROM IN VITRO FERTILIZATION IN SECOND TRIMESTER: ICD-10-CM

## 2022-05-26 DIAGNOSIS — O99.210 OBESITY IN PREGNANCY: ICD-10-CM

## 2022-05-26 PROCEDURE — 76816 OB US FOLLOW-UP PER FETUS: CPT

## 2022-05-26 PROCEDURE — 76816 OB US FOLLOW-UP PER FETUS: CPT | Mod: 26 | Performed by: OBSTETRICS & GYNECOLOGY

## 2022-05-26 PROCEDURE — 99207 PR NO CHARGE LOS: CPT | Performed by: OBSTETRICS & GYNECOLOGY

## 2022-05-27 RX ORDER — LIDOCAINE 40 MG/G
CREAM TOPICAL
Status: CANCELLED | OUTPATIENT
Start: 2022-05-27

## 2022-05-27 RX ORDER — OXYTOCIN/0.9 % SODIUM CHLORIDE 30/500 ML
100-340 PLASTIC BAG, INJECTION (ML) INTRAVENOUS CONTINUOUS PRN
Status: CANCELLED | OUTPATIENT
Start: 2022-05-27

## 2022-05-27 RX ORDER — SODIUM CHLORIDE, SODIUM LACTATE, POTASSIUM CHLORIDE, CALCIUM CHLORIDE 600; 310; 30; 20 MG/100ML; MG/100ML; MG/100ML; MG/100ML
INJECTION, SOLUTION INTRAVENOUS CONTINUOUS
Status: CANCELLED | OUTPATIENT
Start: 2022-05-27

## 2022-05-27 RX ORDER — OXYTOCIN/0.9 % SODIUM CHLORIDE 30/500 ML
340 PLASTIC BAG, INJECTION (ML) INTRAVENOUS CONTINUOUS PRN
Status: CANCELLED | OUTPATIENT
Start: 2022-05-27

## 2022-05-27 RX ORDER — CEFAZOLIN SODIUM 2 G/100ML
2 INJECTION, SOLUTION INTRAVENOUS
Status: CANCELLED | OUTPATIENT
Start: 2022-05-27

## 2022-05-27 RX ORDER — CEFAZOLIN SODIUM 2 G/100ML
2 INJECTION, SOLUTION INTRAVENOUS SEE ADMIN INSTRUCTIONS
Status: CANCELLED | OUTPATIENT
Start: 2022-05-27

## 2022-05-27 RX ORDER — CITRIC ACID/SODIUM CITRATE 334-500MG
30 SOLUTION, ORAL ORAL
Status: CANCELLED | OUTPATIENT
Start: 2022-05-27

## 2022-05-27 RX ORDER — ACETAMINOPHEN 325 MG/1
975 TABLET ORAL ONCE
Status: CANCELLED | OUTPATIENT
Start: 2022-05-27 | End: 2022-05-27

## 2022-05-27 RX ORDER — MISOPROSTOL 200 UG/1
800 TABLET ORAL
Status: CANCELLED | OUTPATIENT
Start: 2022-05-27

## 2022-05-27 RX ORDER — METHYLERGONOVINE MALEATE 0.2 MG/ML
200 INJECTION INTRAVENOUS
Status: CANCELLED | OUTPATIENT
Start: 2022-05-27

## 2022-05-27 RX ORDER — OXYTOCIN 10 [USP'U]/ML
10 INJECTION, SOLUTION INTRAMUSCULAR; INTRAVENOUS
Status: CANCELLED | OUTPATIENT
Start: 2022-05-27

## 2022-05-27 RX ORDER — CARBOPROST TROMETHAMINE 250 UG/ML
250 INJECTION, SOLUTION INTRAMUSCULAR
Status: CANCELLED | OUTPATIENT
Start: 2022-05-27

## 2022-05-27 RX ORDER — MISOPROSTOL 200 UG/1
400 TABLET ORAL
Status: CANCELLED | OUTPATIENT
Start: 2022-05-27

## 2022-06-08 ENCOUNTER — APPOINTMENT (OUTPATIENT)
Dept: URBAN - METROPOLITAN AREA CLINIC 260 | Age: 37
Setting detail: DERMATOLOGY
End: 2022-06-11

## 2022-06-08 VITALS — HEIGHT: 63 IN | WEIGHT: 237 LBS

## 2022-06-08 DIAGNOSIS — B07.8 OTHER VIRAL WARTS: ICD-10-CM

## 2022-06-08 PROCEDURE — OTHER BENIGN DESTRUCTION: OTHER

## 2022-06-08 PROCEDURE — 17110 DESTRUCT B9 LESION 1-14: CPT

## 2022-06-08 PROCEDURE — OTHER COUNSELING: OTHER

## 2022-06-08 ASSESSMENT — LOCATION DETAILED DESCRIPTION DERM
LOCATION DETAILED: RIGHT INSTEP
LOCATION DETAILED: RIGHT PLANTAR FOREFOOT OVERLYING 4TH METATARSAL
LOCATION DETAILED: RIGHT LATERAL PLANTAR MIDFOOT

## 2022-06-08 ASSESSMENT — LOCATION SIMPLE DESCRIPTION DERM: LOCATION SIMPLE: RIGHT PLANTAR SURFACE

## 2022-06-08 ASSESSMENT — LOCATION ZONE DERM: LOCATION ZONE: FEET

## 2022-06-08 NOTE — PROCEDURE: BENIGN DESTRUCTION
Include Z78.9 (Other Specified Conditions Influencing Health Status) As An Associated Diagnosis?: No
Consent: The patient's consent was obtained including but not limited to risks of crusting, scabbing, blistering, scarring, darker or lighter pigmentary change, recurrence, incomplete removal and infection.
Render Post-Care Instructions In Note?: yes
Detail Level: Detailed
Medical Necessity Information: It is in your best interest to select a reason for this procedure from the list below. All of these items fulfill various CMS LCD requirements except the new and changing color options.
Anesthesia Volume In Cc: 0.5
Post-Care Instructions: I reviewed with the patient in detail post-care instructions. Patient is to wear sunprotection, and avoid picking at any of the treated lesions. Pt may apply Vaseline to crusted or scabbing areas.
Medical Necessity Clause: This procedure was medically necessary because the lesions that were treated were:
Treatment Number (Will Not Render If 0): 0

## 2022-06-09 ENCOUNTER — OFFICE VISIT (OUTPATIENT)
Dept: MATERNAL FETAL MEDICINE | Facility: HOSPITAL | Age: 37
End: 2022-06-09
Attending: OBSTETRICS & GYNECOLOGY
Payer: COMMERCIAL

## 2022-06-09 ENCOUNTER — ANCILLARY PROCEDURE (OUTPATIENT)
Dept: ULTRASOUND IMAGING | Facility: HOSPITAL | Age: 37
End: 2022-06-09
Attending: OBSTETRICS & GYNECOLOGY
Payer: COMMERCIAL

## 2022-06-09 DIAGNOSIS — O35.AXX0 MICROGNATHIA OF FETUS AFFECTING MANAGEMENT OF MOTHER, ANTEPARTUM: Primary | ICD-10-CM

## 2022-06-09 DIAGNOSIS — O40.3XX0 POLYHYDRAMNIOS IN THIRD TRIMESTER COMPLICATION, SINGLE OR UNSPECIFIED FETUS: ICD-10-CM

## 2022-06-09 PROCEDURE — 76815 OB US LIMITED FETUS(S): CPT

## 2022-06-09 PROCEDURE — 99207 PR NO CHARGE LOS: CPT | Performed by: OBSTETRICS & GYNECOLOGY

## 2022-06-09 PROCEDURE — 76815 OB US LIMITED FETUS(S): CPT | Mod: 26 | Performed by: OBSTETRICS & GYNECOLOGY

## 2022-06-13 PROBLEM — E11.9 TYPE 2 DIABETES MELLITUS WITHOUT COMPLICATION, WITHOUT LONG-TERM CURRENT USE OF INSULIN (H): Status: ACTIVE | Noted: 2021-03-11

## 2022-06-13 PROBLEM — F33.41 RECURRENT MAJOR DEPRESSIVE DISORDER, IN PARTIAL REMISSION (H): Status: ACTIVE | Noted: 2019-03-01

## 2022-06-13 RX ORDER — PROMETHAZINE HYDROCHLORIDE 25 MG/1
TABLET ORAL
Status: ON HOLD | COMMUNITY
Start: 2022-01-04 | End: 2022-07-29

## 2022-06-13 RX ORDER — INSULIN LISPRO 100 [IU]/ML
INJECTION, SOLUTION INTRAVENOUS; SUBCUTANEOUS
COMMUNITY
Start: 2022-03-25 | End: 2022-07-30

## 2022-06-13 RX ORDER — INSULIN DEGLUDEC 200 U/ML
INJECTION, SOLUTION SUBCUTANEOUS
Status: ON HOLD | COMMUNITY
Start: 2021-04-25 | End: 2022-07-30

## 2022-06-13 RX ORDER — METFORMIN HCL 500 MG
TABLET, EXTENDED RELEASE 24 HR ORAL
COMMUNITY
Start: 2021-05-09 | End: 2022-07-30

## 2022-06-13 RX ORDER — ONDANSETRON 4 MG/1
TABLET, ORALLY DISINTEGRATING ORAL
Status: ON HOLD | COMMUNITY
Start: 2022-01-10 | End: 2022-07-30

## 2022-06-13 RX ORDER — INSULIN LISPRO 100 [IU]/ML
INJECTION, SOLUTION INTRAVENOUS; SUBCUTANEOUS
Status: ON HOLD | COMMUNITY
Start: 2022-03-25 | End: 2022-07-30

## 2022-06-13 RX ORDER — FLASH GLUCOSE SENSOR
KIT MISCELLANEOUS
COMMUNITY
Start: 2022-06-01 | End: 2023-10-06

## 2022-06-23 ENCOUNTER — ANCILLARY PROCEDURE (OUTPATIENT)
Dept: ULTRASOUND IMAGING | Facility: HOSPITAL | Age: 37
End: 2022-06-23
Attending: OBSTETRICS & GYNECOLOGY
Payer: COMMERCIAL

## 2022-06-23 ENCOUNTER — OFFICE VISIT (OUTPATIENT)
Dept: MATERNAL FETAL MEDICINE | Facility: HOSPITAL | Age: 37
End: 2022-06-23
Attending: OBSTETRICS & GYNECOLOGY
Payer: COMMERCIAL

## 2022-06-23 DIAGNOSIS — O35.AXX0 MICROGNATHIA OF FETUS AFFECTING MANAGEMENT OF MOTHER, ANTEPARTUM: Primary | ICD-10-CM

## 2022-06-23 DIAGNOSIS — O99.210 OBESITY IN PREGNANCY: ICD-10-CM

## 2022-06-23 DIAGNOSIS — O35.AXX0 MICROGNATHIA OF FETUS AFFECTING MANAGEMENT OF MOTHER, ANTEPARTUM: ICD-10-CM

## 2022-06-23 DIAGNOSIS — O40.3XX0 POLYHYDRAMNIOS IN THIRD TRIMESTER COMPLICATION, SINGLE OR UNSPECIFIED FETUS: ICD-10-CM

## 2022-06-23 DIAGNOSIS — O09.519 AMA (ADVANCED MATERNAL AGE) PRIMIGRAVIDA 35+, UNSPECIFIED TRIMESTER: ICD-10-CM

## 2022-06-23 DIAGNOSIS — O09.812 PREGNANCY RESULTING FROM IN VITRO FERTILIZATION IN SECOND TRIMESTER: ICD-10-CM

## 2022-06-23 PROCEDURE — 76819 FETAL BIOPHYS PROFIL W/O NST: CPT

## 2022-06-23 PROCEDURE — 99207 PR NO CHARGE LOS: CPT | Performed by: OBSTETRICS & GYNECOLOGY

## 2022-06-23 PROCEDURE — 76816 OB US FOLLOW-UP PER FETUS: CPT | Mod: 26 | Performed by: OBSTETRICS & GYNECOLOGY

## 2022-06-23 PROCEDURE — 76819 FETAL BIOPHYS PROFIL W/O NST: CPT | Mod: 26 | Performed by: OBSTETRICS & GYNECOLOGY

## 2022-06-23 PROCEDURE — 76816 OB US FOLLOW-UP PER FETUS: CPT

## 2022-06-23 NOTE — PROGRESS NOTES
Please see the imaging tab for details of the ultrasound performed today.    Damaris Schrader MD  Specialist in Maternal-Fetal Medicine

## 2022-06-24 ENCOUNTER — TELEPHONE (OUTPATIENT)
Dept: OTOLARYNGOLOGY | Facility: CLINIC | Age: 37
End: 2022-06-24

## 2022-06-24 NOTE — TELEPHONE ENCOUNTER
RN spoke with pts mother to schedule with Dr. Reeder per referral placed by Dr. Schrader.     Celina Noble RN

## 2022-06-27 ENCOUNTER — OFFICE VISIT (OUTPATIENT)
Dept: OTOLARYNGOLOGY | Facility: CLINIC | Age: 37
End: 2022-06-27
Attending: OBSTETRICS & GYNECOLOGY
Payer: COMMERCIAL

## 2022-06-27 VITALS — TEMPERATURE: 97 F

## 2022-06-27 DIAGNOSIS — O09.812 PREGNANCY RESULTING FROM IN VITRO FERTILIZATION IN SECOND TRIMESTER: ICD-10-CM

## 2022-06-27 DIAGNOSIS — O40.3XX0 POLYHYDRAMNIOS IN THIRD TRIMESTER COMPLICATION, SINGLE OR UNSPECIFIED FETUS: ICD-10-CM

## 2022-06-27 DIAGNOSIS — O09.519 AMA (ADVANCED MATERNAL AGE) PRIMIGRAVIDA 35+, UNSPECIFIED TRIMESTER: ICD-10-CM

## 2022-06-27 DIAGNOSIS — O99.210 OBESITY IN PREGNANCY: ICD-10-CM

## 2022-06-27 DIAGNOSIS — O35.AXX0 MICROGNATHIA OF FETUS AFFECTING MANAGEMENT OF MOTHER, ANTEPARTUM: ICD-10-CM

## 2022-06-27 PROCEDURE — G0463 HOSPITAL OUTPT CLINIC VISIT: HCPCS

## 2022-06-27 PROCEDURE — 99204 OFFICE O/P NEW MOD 45 MIN: CPT | Performed by: OTOLARYNGOLOGY

## 2022-06-27 NOTE — LETTER
6/27/2022      RE: Melissa Albert  [address]  [place] [address]     Dear Colleague,    Thank you for the opportunity to participate in the care of your patient, Melissa Albert, at the [facility] at [facility]. Please see a copy of my visit note below.    Pediatric Otolaryngology and Facial Plastic Surgery    CC:   Chief Complaints and History of Present Illnesses   Patient presents with     Ent Problem     Cleft Craniofacial       Referring Provider: Macrina:  Date of Service: 06/27/22      Dear Dr. Schrader,    I had the pleasure of meeting Melissa Albert in consultation today at your request in the [facility].    HPI:  Melissa is a 37 year old female who presents for prenatal evaluation for micrognathia.  She is pregnant with her first child via IVF.  Concern for micrognathia on imaging.  Mild polyhydramnios.  Pregestational diabetes.  Advanced maternal age.  No other concerns.      PMH:    Past Medical History:   Diagnosis Date     Depressive disorder      Joint pain         PSH:  Past Surgical History:   Procedure Laterality Date     CHOLECYSTECTOMY       LAPAROSCOPY DIAGNOSTIC (GYN)  7/5/2018    Procedure: LAPAROSCOPY DIAGNOSTIC (GYN);  Diagnostic Laparoscopy with conversion to laparotomy with removal of incarcerated fibroid;  Surgeon: Makayla Damian DO;  Location: [facility] OR     LAPAROTOMY EXPLORATORY N/A 7/5/2018    Procedure: LAPAROTOMY EXPLORATORY;;  Surgeon: Makayla Damian DO;  Location: [facility] OR     LUMBAR PUNCTURE FLUORO GUIDED DIAGNOSTIC  3/22/2019       Medications:    Current Outpatient Medications   Medication Sig Dispense Refill     aspirin (ASA) 81 MG EC tablet Take 81 mg by mouth       HUMALOG KWIKPEN 100 UNIT/ML soln        insulin glargine (LANTUS PEN) 100 UNIT/ML pen Inject 10 Units Subcutaneous       ondansetron (ZOFRAN ODT) 4 MG ODT tab DISSOLVE 1 TABLET ON THE TONGUE  EVERY 8 HOURS AS NEEDED       Prenatal MV-Min-Fe Fum-FA-DHA (PRENATAL 1 PO)        promethazine (PHENERGAN) 25 MG tablet TAKE 1 TABLET BY MOUTH EVERY 4 HOURS AS NEEDED       Biotin 7500 MCG TABS Take 1,500 mg by mouth (Patient not taking: Reported on 6/27/2022)       cholecalciferol (VITAMIN D3) 10 mcg (400 units) TABS tablet Take 400 mg by mouth (Patient not taking: Reported on 6/27/2022)       coenzyme Q-10 10 MG CAPS  (Patient not taking: Reported on 6/27/2022)       Continuous Blood Gluc Sensor (FREESTYLE SONJA 14 DAY SENSOR) MISC CHANGE EVERY 14 DAYS (Patient not taking: Reported on 6/27/2022)       ferrous sulfate (IRON) 325 (65 Fe) MG tablet Take 1 tablet (325 mg) by mouth 2 times daily (Patient not taking: Reported on 6/27/2022) 60 tablet 2     FLUoxetine HCl (PROZAC PO) Take 40 mg by mouth daily  (Patient not taking: Reported on 6/27/2022)       hydroxychloroquine (PLAQUENIL) 200 MG tablet Take 400 mg by mouth daily  (Patient not taking: Reported on 6/27/2022)       insulin degludec (TRESIBA FLEXTOUCH) 200 UNIT/ML pen ADMINISTER UP TO 40 UNITS UNDER THE SKIN DAILY AS DIRECTED (Patient not taking: Reported on 6/27/2022)       insulin lispro (HUMALOG KWIKPEN) 100 UNIT/ML (1 unit dial) KWIKPEN Inject 1-10 units subcutaneous 3 times daily before meals (Patient not taking: Reported on 6/27/2022)       metFORMIN (GLUCOPHAGE XR) 500 MG 24 hr tablet  (Patient not taking: Reported on 6/27/2022)       Probiotic Product (MISC INTESTINAL RONALD REGULAT) CAPS  (Patient not taking: Reported on 6/27/2022)         Allergies:   Allergies   Allergen Reactions     Nsaids GI Disturbance     Organic Nitrates Other (See Comments)     Headache, nausea       Social History:  No smoke exposure   Social History     Socioeconomic History     Marital status:      Spouse name: Not on file     Number of children: Not on file     Years of education: Not on file     Highest education level: Not on file   Occupational History     Not  on file   Tobacco Use     Smoking status: Not on file     Smokeless tobacco: Not on file   Substance and Sexual Activity     Alcohol use: Yes     Drug use: No     Sexual activity: Yes   Other Topics Concern     Not on file   Social History Narrative     Not on file     Social Determinants of Health     Financial Resource Strain: Not on file   Food Insecurity: Not on file   Transportation Needs: Not on file   Physical Activity: Not on file   Stress: Not on file   Social Connections: Not on file   Intimate Partner Violence: Not on file   Housing Stability: Not on file       FAMILY HISTORY:   No bleeding/Clotting disorders, No easy bleeding/bruising, No sickle cell, No family history of difficulties with anesthesia, No family history of Hearing loss.      No family history on file.    REVIEW OF SYSTEMS:  12 point ROS obtained and was negative other than the symptoms noted above in the HPI.    PHYSICAL EXAMINATION:  Temp 97  F (36.1  C) (Temporal)   Deferred secondary to prenatal visit.    Imaging reviewed: Reviewed multiple ultrasounds demonstrating mild micrognathia versus retrognathia.        Impressions and Recommendations:  Melissa is a 37 year old female who presents today with a pregnancy complicated with fetal micrognathia.  Appears to be mild on ultrasound imaging.  There is mild polyhydramnios.  We a long discussion regarding micrognathia and Pierrot band sequence.  We discussed airway obstruction.  We discussed the risk benefits of delivering at the [facility] versus [facility].  The benefit of delivering here would be increased access to expertise and airway management.  However given the mild micrognathia its possible that baby would not need any advanced airway management.  However this is uncertain.  We discussed the risk benefits alternatives of delivering at each location.  Mom understands the risk of delivering at [facility] and the possibility of needing transfer or not having expertise  available for a difficult intubation.  Mom will consider these options.  I do not think an exit procedure is warranted.    Thank you for allowing me to participate in the care of Melissa. Please don't hesitate to contact me.    Juaquin Reeder MD  Pediatric Otolaryngology and Facial Plastic Surgery  Department of Otolaryngology  [facility] [phone]   Pager [phone]   [email or web address]      I spent a total of 45 minutes face-to-face or coordinating care of Melissa Albert. Over 50% of my time on the unit was spent counseling the patient and /or coordinating care regarding airway management of prenatally diagnosed micrognathia.

## 2022-06-27 NOTE — PROGRESS NOTES
Pediatric Otolaryngology and Facial Plastic Surgery    CC:   Chief Complaints and History of Present Illnesses   Patient presents with     Ent Problem     Cleft Craniofacial       Referring Provider: Macrina:  Date of Service: 06/27/22      Dear Dr. Schrader,    I had the pleasure of meeting Melissa Albert in consultation today at your request in the [facility].    HPI:  Melissa is a 37 year old female who presents for prenatal evaluation for micrognathia.  She is pregnant with her first child via IVF.  Concern for micrognathia on imaging.  Mild polyhydramnios.  Pregestational diabetes.  Advanced maternal age.  No other concerns.      PMH:    Past Medical History:   Diagnosis Date     Depressive disorder      Joint pain         PSH:  Past Surgical History:   Procedure Laterality Date     CHOLECYSTECTOMY       LAPAROSCOPY DIAGNOSTIC (GYN)  7/5/2018    Procedure: LAPAROSCOPY DIAGNOSTIC (GYN);  Diagnostic Laparoscopy with conversion to laparotomy with removal of incarcerated fibroid;  Surgeon: Makayla Damian DO;  Location: [facility] OR     LAPAROTOMY EXPLORATORY N/A 7/5/2018    Procedure: LAPAROTOMY EXPLORATORY;;  Surgeon: Makayla Damian DO;  Location: [facility] OR     LUMBAR PUNCTURE FLUORO GUIDED DIAGNOSTIC  3/22/2019       Medications:    Current Outpatient Medications   Medication Sig Dispense Refill     aspirin (ASA) 81 MG EC tablet Take 81 mg by mouth       HUMALOG KWIKPEN 100 UNIT/ML soln        insulin glargine (LANTUS PEN) 100 UNIT/ML pen Inject 10 Units Subcutaneous       ondansetron (ZOFRAN ODT) 4 MG ODT tab DISSOLVE 1 TABLET ON THE TONGUE EVERY 8 HOURS AS NEEDED       Prenatal MV-Min-Fe Fum-FA-DHA (PRENATAL 1 PO)        promethazine (PHENERGAN) 25 MG tablet TAKE 1 TABLET BY MOUTH EVERY 4 HOURS AS NEEDED       Biotin 7500 MCG TABS Take 1,500 mg by mouth (Patient not taking: Reported on 6/27/2022)       cholecalciferol (VITAMIN D3) 10 mcg (400 units) TABS tablet  Take 400 mg by mouth (Patient not taking: Reported on 6/27/2022)       coenzyme Q-10 10 MG CAPS  (Patient not taking: Reported on 6/27/2022)       Continuous Blood Gluc Sensor (FREESTYLE SONJA 14 DAY SENSOR) MISC CHANGE EVERY 14 DAYS (Patient not taking: Reported on 6/27/2022)       ferrous sulfate (IRON) 325 (65 Fe) MG tablet Take 1 tablet (325 mg) by mouth 2 times daily (Patient not taking: Reported on 6/27/2022) 60 tablet 2     FLUoxetine HCl (PROZAC PO) Take 40 mg by mouth daily  (Patient not taking: Reported on 6/27/2022)       hydroxychloroquine (PLAQUENIL) 200 MG tablet Take 400 mg by mouth daily  (Patient not taking: Reported on 6/27/2022)       insulin degludec (TRESIBA FLEXTOUCH) 200 UNIT/ML pen ADMINISTER UP TO 40 UNITS UNDER THE SKIN DAILY AS DIRECTED (Patient not taking: Reported on 6/27/2022)       insulin lispro (HUMALOG KWIKPEN) 100 UNIT/ML (1 unit dial) KWIKPEN Inject 1-10 units subcutaneous 3 times daily before meals (Patient not taking: Reported on 6/27/2022)       metFORMIN (GLUCOPHAGE XR) 500 MG 24 hr tablet  (Patient not taking: Reported on 6/27/2022)       Probiotic Product (MISC INTESTINAL RONALD REGULAT) CAPS  (Patient not taking: Reported on 6/27/2022)         Allergies:   Allergies   Allergen Reactions     Nsaids GI Disturbance     Organic Nitrates Other (See Comments)     Headache, nausea       Social History:  No smoke exposure   Social History     Socioeconomic History     Marital status:      Spouse name: Not on file     Number of children: Not on file     Years of education: Not on file     Highest education level: Not on file   Occupational History     Not on file   Tobacco Use     Smoking status: Not on file     Smokeless tobacco: Not on file   Substance and Sexual Activity     Alcohol use: Yes     Drug use: No     Sexual activity: Yes   Other Topics Concern     Not on file   Social History Narrative     Not on file     Social Determinants of Health     Financial Resource  Strain: Not on file   Food Insecurity: Not on file   Transportation Needs: Not on file   Physical Activity: Not on file   Stress: Not on file   Social Connections: Not on file   Intimate Partner Violence: Not on file   Housing Stability: Not on file       FAMILY HISTORY:   No bleeding/Clotting disorders, No easy bleeding/bruising, No sickle cell, No family history of difficulties with anesthesia, No family history of Hearing loss.      No family history on file.    REVIEW OF SYSTEMS:  12 point ROS obtained and was negative other than the symptoms noted above in the HPI.    PHYSICAL EXAMINATION:  Temp 97  F (36.1  C) (Temporal)   Deferred secondary to prenatal visit.    Imaging reviewed: Reviewed multiple ultrasounds demonstrating mild micrognathia versus retrognathia.        Impressions and Recommendations:  Melissa is a 37 year old female who presents today with a pregnancy complicated with fetal micrognathia.  Appears to be mild on ultrasound imaging.  There is mild polyhydramnios.  We a long discussion regarding micrognathia and Pierrot band sequence.  We discussed airway obstruction.  We discussed the risk benefits of delivering at the [facility] versus [facility].  The benefit of delivering here would be increased access to expertise and airway management.  However given the mild micrognathia its possible that baby would not need any advanced airway management.  However this is uncertain.  We discussed the risk benefits alternatives of delivering at each location.  Mom understands the risk of delivering at [facility] and the possibility of needing transfer or not having expertise available for a difficult intubation.  Mom will consider these options.  I do not think an exit procedure is warranted.    Thank you for allowing me to participate in the care of Melissa. Please don't hesitate to contact me.    Juaquin Reeder MD  Pediatric Otolaryngology and Facial Plastic Surgery  Department of  Otolaryngology  [facility] [phone]   Pager [phone]   [email or web address]      I spent a total of 45 minutes face-to-face or coordinating care of Melissa Albert. Over 50% of my time on the unit was spent counseling the patient and /or coordinating care regarding airway management of prenatally diagnosed micrognathia.

## 2022-06-27 NOTE — PATIENT INSTRUCTIONS
1.  You were seen in the ENT Clinic today by Dr. Reeder. If you have any questions or concerns after your appointment, please call [phone].    2.  Plan is to follow-up as needed.    Thank you!  Celina Noble RN    no

## 2022-06-29 ENCOUNTER — APPOINTMENT (OUTPATIENT)
Dept: URBAN - METROPOLITAN AREA CLINIC 260 | Age: 37
Setting detail: DERMATOLOGY
End: 2022-06-30

## 2022-06-29 VITALS — WEIGHT: 237 LBS | HEIGHT: 63 IN

## 2022-06-29 DIAGNOSIS — B07.8 OTHER VIRAL WARTS: ICD-10-CM

## 2022-06-29 PROCEDURE — 17110 DESTRUCT B9 LESION 1-14: CPT

## 2022-06-29 PROCEDURE — OTHER BENIGN DESTRUCTION: OTHER

## 2022-06-29 PROCEDURE — OTHER COUNSELING: OTHER

## 2022-06-29 PROCEDURE — OTHER MIPS QUALITY: OTHER

## 2022-06-29 ASSESSMENT — LOCATION ZONE DERM: LOCATION ZONE: FEET

## 2022-06-29 ASSESSMENT — LOCATION DETAILED DESCRIPTION DERM
LOCATION DETAILED: RIGHT PLANTAR FOREFOOT OVERLYING 4TH METATARSAL
LOCATION DETAILED: RIGHT LATERAL PLANTAR MIDFOOT
LOCATION DETAILED: RIGHT INSTEP

## 2022-06-29 ASSESSMENT — LOCATION SIMPLE DESCRIPTION DERM: LOCATION SIMPLE: RIGHT PLANTAR SURFACE

## 2022-06-29 NOTE — PROCEDURE: BENIGN DESTRUCTION
Medical Necessity Clause: This procedure was medically necessary because the lesions that were treated were:
Anesthesia Volume In Cc: 0.5
Post-Care Instructions: I reviewed with the patient in detail post-care instructions. Patient is to wear sunprotection, and avoid picking at any of the treated lesions. Pt may apply Vaseline to crusted or scabbing areas.
Add 52 Modifier (Optional): no
Treatment Number (Will Not Render If 0): 0
Consent: The patient's consent was obtained including but not limited to risks of crusting, scabbing, blistering, scarring, darker or lighter pigmentary change, recurrence, incomplete removal and infection.
Detail Level: Detailed
Render Post-Care Instructions In Note?: yes
Medical Necessity Information: It is in your best interest to select a reason for this procedure from the list below. All of these items fulfill various CMS LCD requirements except the new and changing color options.

## 2022-07-06 ENCOUNTER — ANCILLARY PROCEDURE (OUTPATIENT)
Dept: ULTRASOUND IMAGING | Facility: HOSPITAL | Age: 37
End: 2022-07-06
Attending: OBSTETRICS & GYNECOLOGY
Payer: COMMERCIAL

## 2022-07-06 ENCOUNTER — OFFICE VISIT (OUTPATIENT)
Dept: MATERNAL FETAL MEDICINE | Facility: HOSPITAL | Age: 37
End: 2022-07-06
Attending: OBSTETRICS & GYNECOLOGY
Payer: COMMERCIAL

## 2022-07-06 DIAGNOSIS — O09.812 PREGNANCY RESULTING FROM IN VITRO FERTILIZATION IN SECOND TRIMESTER: ICD-10-CM

## 2022-07-06 DIAGNOSIS — O99.210 OBESITY IN PREGNANCY: ICD-10-CM

## 2022-07-06 DIAGNOSIS — O40.3XX0 POLYHYDRAMNIOS IN THIRD TRIMESTER COMPLICATION, SINGLE OR UNSPECIFIED FETUS: ICD-10-CM

## 2022-07-06 DIAGNOSIS — O09.519 AMA (ADVANCED MATERNAL AGE) PRIMIGRAVIDA 35+, UNSPECIFIED TRIMESTER: ICD-10-CM

## 2022-07-06 DIAGNOSIS — O35.AXX0 MICROGNATHIA OF FETUS AFFECTING MANAGEMENT OF MOTHER, ANTEPARTUM: ICD-10-CM

## 2022-07-06 DIAGNOSIS — O24.113 PREGNANCY WITH TYPE 2 DIABETES MELLITUS IN THIRD TRIMESTER: Primary | ICD-10-CM

## 2022-07-06 PROCEDURE — 99207 PR NO CHARGE LOS: CPT | Performed by: OBSTETRICS & GYNECOLOGY

## 2022-07-06 PROCEDURE — 76819 FETAL BIOPHYS PROFIL W/O NST: CPT | Mod: 26 | Performed by: OBSTETRICS & GYNECOLOGY

## 2022-07-06 PROCEDURE — 76819 FETAL BIOPHYS PROFIL W/O NST: CPT

## 2022-07-06 PROCEDURE — 76815 OB US LIMITED FETUS(S): CPT | Mod: 26 | Performed by: OBSTETRICS & GYNECOLOGY

## 2022-07-06 NOTE — PROGRESS NOTES
The patient was seen for an ultrasound in the Maternal-Fetal Medicine Center at the [facility] today.  For a detailed report of the ultrasound examination, please see the ultrasound report which can be found under the imaging tab.    Linda Stokes MD  , OB/GYN  Maternal-Fetal Medicine  [phone] (Pager)

## 2022-07-11 ENCOUNTER — OFFICE VISIT (OUTPATIENT)
Dept: MATERNAL FETAL MEDICINE | Facility: HOSPITAL | Age: 37
End: 2022-07-11
Attending: OBSTETRICS & GYNECOLOGY
Payer: COMMERCIAL

## 2022-07-11 ENCOUNTER — ANCILLARY PROCEDURE (OUTPATIENT)
Dept: ULTRASOUND IMAGING | Facility: HOSPITAL | Age: 37
End: 2022-07-11
Attending: OBSTETRICS & GYNECOLOGY
Payer: COMMERCIAL

## 2022-07-11 DIAGNOSIS — O35.AXX0 MICROGNATHIA OF FETUS AFFECTING MANAGEMENT OF MOTHER, ANTEPARTUM: ICD-10-CM

## 2022-07-11 DIAGNOSIS — O09.812 PREGNANCY RESULTING FROM IN VITRO FERTILIZATION IN SECOND TRIMESTER: ICD-10-CM

## 2022-07-11 DIAGNOSIS — O99.210 OBESITY IN PREGNANCY: ICD-10-CM

## 2022-07-11 DIAGNOSIS — O40.3XX0 POLYHYDRAMNIOS IN THIRD TRIMESTER COMPLICATION, SINGLE OR UNSPECIFIED FETUS: ICD-10-CM

## 2022-07-11 DIAGNOSIS — O09.519 AMA (ADVANCED MATERNAL AGE) PRIMIGRAVIDA 35+, UNSPECIFIED TRIMESTER: ICD-10-CM

## 2022-07-11 DIAGNOSIS — O40.3XX0 POLYHYDRAMNIOS IN THIRD TRIMESTER COMPLICATION, SINGLE OR UNSPECIFIED FETUS: Primary | ICD-10-CM

## 2022-07-11 PROCEDURE — 76819 FETAL BIOPHYS PROFIL W/O NST: CPT | Mod: 26 | Performed by: OBSTETRICS & GYNECOLOGY

## 2022-07-11 PROCEDURE — 99207 PR NO CHARGE LOS: CPT | Performed by: OBSTETRICS & GYNECOLOGY

## 2022-07-11 PROCEDURE — 76815 OB US LIMITED FETUS(S): CPT | Mod: 26 | Performed by: OBSTETRICS & GYNECOLOGY

## 2022-07-11 PROCEDURE — 76819 FETAL BIOPHYS PROFIL W/O NST: CPT

## 2022-07-18 ENCOUNTER — OFFICE VISIT (OUTPATIENT)
Dept: MATERNAL FETAL MEDICINE | Facility: HOSPITAL | Age: 37
End: 2022-07-18
Attending: OBSTETRICS & GYNECOLOGY
Payer: COMMERCIAL

## 2022-07-18 ENCOUNTER — ANCILLARY PROCEDURE (OUTPATIENT)
Dept: ULTRASOUND IMAGING | Facility: HOSPITAL | Age: 37
End: 2022-07-18
Attending: OBSTETRICS & GYNECOLOGY
Payer: COMMERCIAL

## 2022-07-18 DIAGNOSIS — O40.3XX0 POLYHYDRAMNIOS IN THIRD TRIMESTER COMPLICATION, SINGLE OR UNSPECIFIED FETUS: ICD-10-CM

## 2022-07-18 DIAGNOSIS — O09.519 AMA (ADVANCED MATERNAL AGE) PRIMIGRAVIDA 35+, UNSPECIFIED TRIMESTER: ICD-10-CM

## 2022-07-18 DIAGNOSIS — O35.9XX0 SUSPECTED FETAL ANOMALY, ANTEPARTUM, SINGLE OR UNSPECIFIED FETUS: ICD-10-CM

## 2022-07-18 DIAGNOSIS — O24.113 PREGNANCY WITH TYPE 2 DIABETES MELLITUS IN THIRD TRIMESTER: Primary | ICD-10-CM

## 2022-07-18 DIAGNOSIS — O99.210 OBESITY IN PREGNANCY: ICD-10-CM

## 2022-07-18 DIAGNOSIS — O09.812 PREGNANCY RESULTING FROM IN VITRO FERTILIZATION IN SECOND TRIMESTER: ICD-10-CM

## 2022-07-18 DIAGNOSIS — O35.AXX0 MICROGNATHIA OF FETUS AFFECTING MANAGEMENT OF MOTHER, ANTEPARTUM: ICD-10-CM

## 2022-07-18 PROCEDURE — 76816 OB US FOLLOW-UP PER FETUS: CPT

## 2022-07-18 PROCEDURE — 76816 OB US FOLLOW-UP PER FETUS: CPT | Mod: 26 | Performed by: OBSTETRICS & GYNECOLOGY

## 2022-07-18 PROCEDURE — 76819 FETAL BIOPHYS PROFIL W/O NST: CPT | Mod: 26 | Performed by: OBSTETRICS & GYNECOLOGY

## 2022-07-18 PROCEDURE — 99207 PR NO CHARGE LOS: CPT | Performed by: OBSTETRICS & GYNECOLOGY

## 2022-07-18 PROCEDURE — 76819 FETAL BIOPHYS PROFIL W/O NST: CPT

## 2022-07-18 NOTE — PROGRESS NOTES
The patient was seen for an ultrasound in the [facility] at the [facility] today.  For a detailed report of the ultrasound examination, please see the ultrasound report which can be found under the imaging tab.    Linda Stokes MD  , OB/GYN  Maternal-Fetal Medicine  [phone] (Pager)

## 2022-07-22 ENCOUNTER — LAB REQUISITION (OUTPATIENT)
Dept: LAB | Facility: CLINIC | Age: 37
End: 2022-07-22
Payer: COMMERCIAL

## 2022-07-22 DIAGNOSIS — Z36.85 ENCOUNTER FOR ANTENATAL SCREENING FOR STREPTOCOCCUS B: ICD-10-CM

## 2022-07-22 PROCEDURE — 87081 CULTURE SCREEN ONLY: CPT | Mod: ORL | Performed by: OBSTETRICS & GYNECOLOGY

## 2022-07-25 ENCOUNTER — OFFICE VISIT (OUTPATIENT)
Dept: MATERNAL FETAL MEDICINE | Facility: HOSPITAL | Age: 37
End: 2022-07-25
Attending: OBSTETRICS & GYNECOLOGY
Payer: COMMERCIAL

## 2022-07-25 ENCOUNTER — ANCILLARY PROCEDURE (OUTPATIENT)
Dept: ULTRASOUND IMAGING | Facility: HOSPITAL | Age: 37
End: 2022-07-25
Attending: OBSTETRICS & GYNECOLOGY
Payer: COMMERCIAL

## 2022-07-25 DIAGNOSIS — O35.AXX0 MICROGNATHIA OF FETUS AFFECTING MANAGEMENT OF MOTHER, ANTEPARTUM: ICD-10-CM

## 2022-07-25 DIAGNOSIS — O24.113 PREGNANCY WITH TYPE 2 DIABETES MELLITUS IN THIRD TRIMESTER: Primary | ICD-10-CM

## 2022-07-25 DIAGNOSIS — O99.210 OBESITY IN PREGNANCY: ICD-10-CM

## 2022-07-25 DIAGNOSIS — O09.519 AMA (ADVANCED MATERNAL AGE) PRIMIGRAVIDA 35+, UNSPECIFIED TRIMESTER: ICD-10-CM

## 2022-07-25 DIAGNOSIS — O09.812 PREGNANCY RESULTING FROM IN VITRO FERTILIZATION IN SECOND TRIMESTER: ICD-10-CM

## 2022-07-25 DIAGNOSIS — O40.3XX0 POLYHYDRAMNIOS IN THIRD TRIMESTER COMPLICATION, SINGLE OR UNSPECIFIED FETUS: ICD-10-CM

## 2022-07-25 LAB — BACTERIA SPEC CULT: NORMAL

## 2022-07-25 PROCEDURE — 76819 FETAL BIOPHYS PROFIL W/O NST: CPT

## 2022-07-25 PROCEDURE — 99207 PR NO CHARGE LOS: CPT | Performed by: OBSTETRICS & GYNECOLOGY

## 2022-07-25 PROCEDURE — 76819 FETAL BIOPHYS PROFIL W/O NST: CPT | Mod: 26 | Performed by: OBSTETRICS & GYNECOLOGY

## 2022-07-25 NOTE — PROGRESS NOTES
Please refer to ultrasound report under 'Imaging' Studies of 'Chart Review' tabs.    Del Hudson M.D.

## 2022-07-26 ENCOUNTER — LAB (OUTPATIENT)
Dept: FAMILY MEDICINE | Facility: CLINIC | Age: 37
End: 2022-07-26
Payer: COMMERCIAL

## 2022-07-26 DIAGNOSIS — Z20.822 ENCOUNTER FOR LABORATORY TESTING FOR COVID-19 VIRUS: ICD-10-CM

## 2022-07-26 LAB — SARS-COV-2 RNA RESP QL NAA+PROBE: NEGATIVE

## 2022-07-26 PROCEDURE — U0003 INFECTIOUS AGENT DETECTION BY NUCLEIC ACID (DNA OR RNA); SEVERE ACUTE RESPIRATORY SYNDROME CORONAVIRUS 2 (SARS-COV-2) (CORONAVIRUS DISEASE [COVID-19]), AMPLIFIED PROBE TECHNIQUE, MAKING USE OF HIGH THROUGHPUT TECHNOLOGIES AS DESCRIBED BY CMS-2020-01-R: HCPCS

## 2022-07-26 PROCEDURE — U0005 INFEC AGEN DETEC AMPLI PROBE: HCPCS

## 2022-07-27 ENCOUNTER — PREP FOR PROCEDURE (OUTPATIENT)
Dept: MATERNAL FETAL MEDICINE | Facility: CLINIC | Age: 37
End: 2022-07-27

## 2022-07-27 ENCOUNTER — TELEPHONE (OUTPATIENT)
Dept: MATERNAL FETAL MEDICINE | Facility: CLINIC | Age: 37
End: 2022-07-27

## 2022-07-27 DIAGNOSIS — O24.113 TYPE 2 DIABETES MELLITUS COMPLICATING PREGNANCY IN THIRD TRIMESTER, ANTEPARTUM: ICD-10-CM

## 2022-07-27 RX ORDER — MISOPROSTOL 200 UG/1
800 TABLET ORAL
Status: CANCELLED | OUTPATIENT
Start: 2022-07-27

## 2022-07-27 RX ORDER — MISOPROSTOL 200 UG/1
400 TABLET ORAL
Status: CANCELLED | OUTPATIENT
Start: 2022-07-27

## 2022-07-27 RX ORDER — CARBOPROST TROMETHAMINE 250 UG/ML
250 INJECTION, SOLUTION INTRAMUSCULAR
Status: CANCELLED | OUTPATIENT
Start: 2022-07-27

## 2022-07-27 RX ORDER — CEFAZOLIN SODIUM 2 G/100ML
2 INJECTION, SOLUTION INTRAVENOUS
Status: CANCELLED | OUTPATIENT
Start: 2022-07-27

## 2022-07-27 RX ORDER — SODIUM CHLORIDE, SODIUM LACTATE, POTASSIUM CHLORIDE, CALCIUM CHLORIDE 600; 310; 30; 20 MG/100ML; MG/100ML; MG/100ML; MG/100ML
INJECTION, SOLUTION INTRAVENOUS CONTINUOUS
Status: CANCELLED | OUTPATIENT
Start: 2022-07-27

## 2022-07-27 RX ORDER — TRANEXAMIC ACID 10 MG/ML
1 INJECTION, SOLUTION INTRAVENOUS EVERY 30 MIN PRN
Status: CANCELLED | OUTPATIENT
Start: 2022-07-27

## 2022-07-27 RX ORDER — ACETAMINOPHEN 325 MG/1
975 TABLET ORAL ONCE
Status: CANCELLED | OUTPATIENT
Start: 2022-07-27 | End: 2022-07-27

## 2022-07-27 RX ORDER — OXYTOCIN 10 [USP'U]/ML
10 INJECTION, SOLUTION INTRAMUSCULAR; INTRAVENOUS
Status: CANCELLED | OUTPATIENT
Start: 2022-07-27

## 2022-07-27 RX ORDER — LIDOCAINE 40 MG/G
CREAM TOPICAL
Status: CANCELLED | OUTPATIENT
Start: 2022-07-27

## 2022-07-27 RX ORDER — CEFAZOLIN SODIUM 2 G/100ML
2 INJECTION, SOLUTION INTRAVENOUS SEE ADMIN INSTRUCTIONS
Status: CANCELLED | OUTPATIENT
Start: 2022-07-27

## 2022-07-27 RX ORDER — OXYTOCIN/0.9 % SODIUM CHLORIDE 30/500 ML
340 PLASTIC BAG, INJECTION (ML) INTRAVENOUS CONTINUOUS PRN
Status: CANCELLED | OUTPATIENT
Start: 2022-07-27

## 2022-07-27 RX ORDER — CITRIC ACID/SODIUM CITRATE 334-500MG
30 SOLUTION, ORAL ORAL
Status: CANCELLED | OUTPATIENT
Start: 2022-07-27

## 2022-07-27 RX ORDER — OXYTOCIN/0.9 % SODIUM CHLORIDE 30/500 ML
100-340 PLASTIC BAG, INJECTION (ML) INTRAVENOUS CONTINUOUS PRN
Status: CANCELLED | OUTPATIENT
Start: 2022-07-27

## 2022-07-27 RX ORDER — METHYLERGONOVINE MALEATE 0.2 MG/ML
200 INJECTION INTRAVENOUS
Status: CANCELLED | OUTPATIENT
Start: 2022-07-27

## 2022-07-27 NOTE — TELEPHONE ENCOUNTER
Callled pt with new c/s time, instructed no food after midnight, clear liquids until 11:30a. OR time scheduled for 1330. Pt aware of visitor policy, discussed plan for NICU attendance at delivery. Pt given directions, PCC phone number for questions. Pt had neg COVID test yesterday.

## 2022-07-28 ENCOUNTER — ANESTHESIA EVENT (OUTPATIENT)
Dept: OBGYN | Facility: CLINIC | Age: 37
End: 2022-07-28
Payer: COMMERCIAL

## 2022-07-29 ENCOUNTER — HOSPITAL ENCOUNTER (INPATIENT)
Facility: CLINIC | Age: 37
LOS: 6 days | Discharge: HOME | End: 2022-08-04
Attending: OBSTETRICS & GYNECOLOGY | Admitting: OBSTETRICS & GYNECOLOGY
Payer: COMMERCIAL

## 2022-07-29 ENCOUNTER — ANESTHESIA (OUTPATIENT)
Dept: OBGYN | Facility: CLINIC | Age: 37
End: 2022-07-29
Payer: COMMERCIAL

## 2022-07-29 DIAGNOSIS — G89.18 ACUTE POST-OPERATIVE PAIN: ICD-10-CM

## 2022-07-29 DIAGNOSIS — D62 ANEMIA DUE TO BLOOD LOSS, ACUTE: ICD-10-CM

## 2022-07-29 DIAGNOSIS — O24.113 TYPE 2 DIABETES MELLITUS COMPLICATING PREGNANCY IN THIRD TRIMESTER, ANTEPARTUM: ICD-10-CM

## 2022-07-29 DIAGNOSIS — O16.9 HYPERTENSION IN PREGNANCY, DELIVERED: ICD-10-CM

## 2022-07-29 DIAGNOSIS — Z98.891 S/P CESAREAN SECTION: Primary | ICD-10-CM

## 2022-07-29 LAB
ABO/RH(D): NORMAL
ANTIBODY SCREEN: NEGATIVE
APTT PPP: 25 SECONDS (ref 22–38)
APTT PPP: 26 SECONDS (ref 22–38)
BLD PROD TYP BPU: NORMAL
BLD PROD TYP BPU: NORMAL
BLOOD COMPONENT TYPE: NORMAL
BLOOD COMPONENT TYPE: NORMAL
CODING SYSTEM: NORMAL
CODING SYSTEM: NORMAL
CROSSMATCH: NORMAL
CROSSMATCH: NORMAL
ERYTHROCYTE [DISTWIDTH] IN BLOOD BY AUTOMATED COUNT: 14.7 % (ref 10–15)
FIBRINOGEN PPP-MCNC: 375 MG/DL (ref 170–490)
FIBRINOGEN PPP-MCNC: 408 MG/DL (ref 170–490)
GLUCOSE BLDC GLUCOMTR-MCNC: 115 MG/DL (ref 70–99)
GLUCOSE BLDC GLUCOMTR-MCNC: 124 MG/DL (ref 70–99)
GLUCOSE BLDC GLUCOMTR-MCNC: 128 MG/DL (ref 70–99)
GLUCOSE BLDC GLUCOMTR-MCNC: 82 MG/DL (ref 70–99)
HCT VFR BLD AUTO: 24.8 % (ref 35–47)
HGB BLD-MCNC: 10.1 G/DL (ref 11.7–15.7)
HGB BLD-MCNC: 7.1 G/DL (ref 11.7–15.7)
HGB BLD-MCNC: 7.6 G/DL (ref 11.7–15.7)
HOLD SPECIMEN: NORMAL
INR PPP: 1.07 (ref 0.85–1.15)
INR PPP: 1.08 (ref 0.85–1.15)
ISSUE DATE AND TIME: NORMAL
MCH RBC QN AUTO: 26.9 PG (ref 26.5–33)
MCHC RBC AUTO-ENTMCNC: 30.6 G/DL (ref 31.5–36.5)
MCV RBC AUTO: 88 FL (ref 78–100)
PLATELET # BLD AUTO: 283 10E3/UL (ref 150–450)
RBC # BLD AUTO: 2.83 10E6/UL (ref 3.8–5.2)
SPECIMEN EXPIRATION DATE: NORMAL
T PALLIDUM AB SER QL: NONREACTIVE
UNIT ABO/RH: NORMAL
UNIT ABO/RH: NORMAL
UNIT NUMBER: NORMAL
UNIT NUMBER: NORMAL
UNIT STATUS: NORMAL
UNIT STATUS: NORMAL
UNIT TYPE ISBT: 6200
UNIT TYPE ISBT: 6200
WBC # BLD AUTO: 11.3 10E3/UL (ref 4–11)

## 2022-07-29 PROCEDURE — 258N000003 HC RX IP 258 OP 636: Performed by: OBSTETRICS & GYNECOLOGY

## 2022-07-29 PROCEDURE — 86923 COMPATIBILITY TEST ELECTRIC: CPT | Performed by: STUDENT IN AN ORGANIZED HEALTH CARE EDUCATION/TRAINING PROGRAM

## 2022-07-29 PROCEDURE — 85384 FIBRINOGEN ACTIVITY: CPT | Performed by: STUDENT IN AN ORGANIZED HEALTH CARE EDUCATION/TRAINING PROGRAM

## 2022-07-29 PROCEDURE — 86850 RBC ANTIBODY SCREEN: CPT | Performed by: PHYSICIAN ASSISTANT

## 2022-07-29 PROCEDURE — 250N000013 HC RX MED GY IP 250 OP 250 PS 637: Performed by: OBSTETRICS & GYNECOLOGY

## 2022-07-29 PROCEDURE — 360N000076 HC SURGERY LEVEL 3, PER MIN: Performed by: OBSTETRICS & GYNECOLOGY

## 2022-07-29 PROCEDURE — 258N000003 HC RX IP 258 OP 636: Performed by: STUDENT IN AN ORGANIZED HEALTH CARE EDUCATION/TRAINING PROGRAM

## 2022-07-29 PROCEDURE — 250N000009 HC RX 250: Performed by: PHYSICIAN ASSISTANT

## 2022-07-29 PROCEDURE — 999N000127 HC STATISTIC PERIPHERAL IV START W US GUIDANCE

## 2022-07-29 PROCEDURE — 85018 HEMOGLOBIN: CPT | Performed by: STUDENT IN AN ORGANIZED HEALTH CARE EDUCATION/TRAINING PROGRAM

## 2022-07-29 PROCEDURE — 250N000011 HC RX IP 250 OP 636: Performed by: NURSE ANESTHETIST, CERTIFIED REGISTERED

## 2022-07-29 PROCEDURE — 258N000003 HC RX IP 258 OP 636: Performed by: NURSE ANESTHETIST, CERTIFIED REGISTERED

## 2022-07-29 PROCEDURE — 120N000002 HC R&B MED SURG/OB UMMC

## 2022-07-29 PROCEDURE — 250N000011 HC RX IP 250 OP 636: Performed by: PHYSICAL THERAPIST

## 2022-07-29 PROCEDURE — 36415 COLL VENOUS BLD VENIPUNCTURE: CPT | Performed by: STUDENT IN AN ORGANIZED HEALTH CARE EDUCATION/TRAINING PROGRAM

## 2022-07-29 PROCEDURE — 85018 HEMOGLOBIN: CPT | Performed by: PHYSICIAN ASSISTANT

## 2022-07-29 PROCEDURE — 59514 CESAREAN DELIVERY ONLY: CPT | Mod: GC | Performed by: OBSTETRICS & GYNECOLOGY

## 2022-07-29 PROCEDURE — 250N000011 HC RX IP 250 OP 636: Performed by: PHYSICIAN ASSISTANT

## 2022-07-29 PROCEDURE — C9290 INJ, BUPIVACAINE LIPOSOME: HCPCS | Performed by: STUDENT IN AN ORGANIZED HEALTH CARE EDUCATION/TRAINING PROGRAM

## 2022-07-29 PROCEDURE — 370N000017 HC ANESTHESIA TECHNICAL FEE, PER MIN: Performed by: OBSTETRICS & GYNECOLOGY

## 2022-07-29 PROCEDURE — 86923 COMPATIBILITY TEST ELECTRIC: CPT | Performed by: OBSTETRICS & GYNECOLOGY

## 2022-07-29 PROCEDURE — 271N000001 HC OR GENERAL SUPPLY NON-STERILE: Performed by: OBSTETRICS & GYNECOLOGY

## 2022-07-29 PROCEDURE — 36415 COLL VENOUS BLD VENIPUNCTURE: CPT | Performed by: PHYSICIAN ASSISTANT

## 2022-07-29 PROCEDURE — 85730 THROMBOPLASTIN TIME PARTIAL: CPT | Performed by: STUDENT IN AN ORGANIZED HEALTH CARE EDUCATION/TRAINING PROGRAM

## 2022-07-29 PROCEDURE — 250N000011 HC RX IP 250 OP 636: Performed by: STUDENT IN AN ORGANIZED HEALTH CARE EDUCATION/TRAINING PROGRAM

## 2022-07-29 PROCEDURE — 999N000141 HC STATISTIC PRE-PROCEDURE NURSING ASSESSMENT: Performed by: OBSTETRICS & GYNECOLOGY

## 2022-07-29 PROCEDURE — 272N000001 HC OR GENERAL SUPPLY STERILE: Performed by: OBSTETRICS & GYNECOLOGY

## 2022-07-29 PROCEDURE — 999N000010 HC STATISTIC ANES STAT CODE-CRNA PER MINUTE: Performed by: OBSTETRICS & GYNECOLOGY

## 2022-07-29 PROCEDURE — 85610 PROTHROMBIN TIME: CPT | Performed by: STUDENT IN AN ORGANIZED HEALTH CARE EDUCATION/TRAINING PROGRAM

## 2022-07-29 PROCEDURE — 710N000010 HC RECOVERY PHASE 1, LEVEL 2, PER MIN: Performed by: OBSTETRICS & GYNECOLOGY

## 2022-07-29 PROCEDURE — 250N000009 HC RX 250: Performed by: NURSE ANESTHETIST, CERTIFIED REGISTERED

## 2022-07-29 PROCEDURE — 85014 HEMATOCRIT: CPT | Performed by: STUDENT IN AN ORGANIZED HEALTH CARE EDUCATION/TRAINING PROGRAM

## 2022-07-29 PROCEDURE — 250N000009 HC RX 250: Performed by: OBSTETRICS & GYNECOLOGY

## 2022-07-29 PROCEDURE — 86780 TREPONEMA PALLIDUM: CPT | Performed by: PHYSICIAN ASSISTANT

## 2022-07-29 RX ORDER — LIDOCAINE 40 MG/G
CREAM TOPICAL
Status: DISCONTINUED | OUTPATIENT
Start: 2022-07-29 | End: 2022-07-29 | Stop reason: HOSPADM

## 2022-07-29 RX ORDER — CARBOPROST TROMETHAMINE 250 UG/ML
INJECTION, SOLUTION INTRAMUSCULAR
Status: DISCONTINUED
Start: 2022-07-29 | End: 2022-07-29 | Stop reason: HOSPADM

## 2022-07-29 RX ORDER — ACETAMINOPHEN 325 MG/1
975 TABLET ORAL ONCE
Status: COMPLETED | OUTPATIENT
Start: 2022-07-29 | End: 2022-07-29

## 2022-07-29 RX ORDER — METHYLERGONOVINE MALEATE 0.2 MG/ML
200 INJECTION INTRAVENOUS
Status: DISCONTINUED | OUTPATIENT
Start: 2022-07-29 | End: 2022-07-29 | Stop reason: HOSPADM

## 2022-07-29 RX ORDER — TRANEXAMIC ACID 10 MG/ML
1 INJECTION, SOLUTION INTRAVENOUS EVERY 30 MIN PRN
Status: DISCONTINUED | OUTPATIENT
Start: 2022-07-29 | End: 2022-08-04 | Stop reason: HOSPADM

## 2022-07-29 RX ORDER — IBUPROFEN 800 MG/1
800 TABLET, FILM COATED ORAL EVERY 6 HOURS
Status: DISCONTINUED | OUTPATIENT
Start: 2022-07-30 | End: 2022-08-04 | Stop reason: HOSPADM

## 2022-07-29 RX ORDER — DEXTROSE, SODIUM CHLORIDE, SODIUM LACTATE, POTASSIUM CHLORIDE, AND CALCIUM CHLORIDE 5; .6; .31; .03; .02 G/100ML; G/100ML; G/100ML; G/100ML; G/100ML
INJECTION, SOLUTION INTRAVENOUS CONTINUOUS
Status: DISCONTINUED | OUTPATIENT
Start: 2022-07-29 | End: 2022-08-04 | Stop reason: HOSPADM

## 2022-07-29 RX ORDER — LIDOCAINE 40 MG/G
CREAM TOPICAL
Status: DISCONTINUED | OUTPATIENT
Start: 2022-07-29 | End: 2022-08-04 | Stop reason: HOSPADM

## 2022-07-29 RX ORDER — BUPIVACAINE HYDROCHLORIDE 2.5 MG/ML
INJECTION, SOLUTION EPIDURAL; INFILTRATION; INTRACAUDAL
Status: DISCONTINUED | OUTPATIENT
Start: 2022-07-29 | End: 2022-07-29

## 2022-07-29 RX ORDER — MISOPROSTOL 200 UG/1
800 TABLET ORAL
Status: DISCONTINUED | OUTPATIENT
Start: 2022-07-29 | End: 2022-07-29 | Stop reason: HOSPADM

## 2022-07-29 RX ORDER — AMOXICILLIN 250 MG
2 CAPSULE ORAL 2 TIMES DAILY
Status: DISCONTINUED | OUTPATIENT
Start: 2022-07-29 | End: 2022-08-04 | Stop reason: HOSPADM

## 2022-07-29 RX ORDER — MORPHINE SULFATE 1 MG/ML
INJECTION, SOLUTION EPIDURAL; INTRATHECAL; INTRAVENOUS
Status: DISCONTINUED | OUTPATIENT
Start: 2022-07-29 | End: 2022-07-29

## 2022-07-29 RX ORDER — ONDANSETRON 2 MG/ML
4 INJECTION INTRAMUSCULAR; INTRAVENOUS EVERY 30 MIN PRN
Status: DISCONTINUED | OUTPATIENT
Start: 2022-07-29 | End: 2022-07-29 | Stop reason: HOSPADM

## 2022-07-29 RX ORDER — HYDROCORTISONE 25 MG/G
CREAM TOPICAL 3 TIMES DAILY PRN
Status: DISCONTINUED | OUTPATIENT
Start: 2022-07-29 | End: 2022-08-04 | Stop reason: HOSPADM

## 2022-07-29 RX ORDER — ONDANSETRON 4 MG/1
4 TABLET, ORALLY DISINTEGRATING ORAL EVERY 30 MIN PRN
Status: DISCONTINUED | OUTPATIENT
Start: 2022-07-29 | End: 2022-07-29 | Stop reason: HOSPADM

## 2022-07-29 RX ORDER — ACETAMINOPHEN 325 MG/1
975 TABLET ORAL EVERY 6 HOURS
Status: DISCONTINUED | OUTPATIENT
Start: 2022-07-29 | End: 2022-08-04 | Stop reason: HOSPADM

## 2022-07-29 RX ORDER — CEFAZOLIN SODIUM/WATER 2 G/20 ML
2 SYRINGE (ML) INTRAVENOUS
Status: DISCONTINUED | OUTPATIENT
Start: 2022-07-29 | End: 2022-07-29 | Stop reason: HOSPADM

## 2022-07-29 RX ORDER — SODIUM CHLORIDE, SODIUM LACTATE, POTASSIUM CHLORIDE, CALCIUM CHLORIDE 600; 310; 30; 20 MG/100ML; MG/100ML; MG/100ML; MG/100ML
INJECTION, SOLUTION INTRAVENOUS CONTINUOUS
Status: DISCONTINUED | OUTPATIENT
Start: 2022-07-29 | End: 2022-07-29 | Stop reason: HOSPADM

## 2022-07-29 RX ORDER — OXYTOCIN/0.9 % SODIUM CHLORIDE 30/500 ML
100-340 PLASTIC BAG, INJECTION (ML) INTRAVENOUS CONTINUOUS PRN
Status: DISCONTINUED | OUTPATIENT
Start: 2022-07-29 | End: 2022-08-04 | Stop reason: HOSPADM

## 2022-07-29 RX ORDER — MODIFIED LANOLIN
OINTMENT (GRAM) TOPICAL
Status: DISCONTINUED | OUTPATIENT
Start: 2022-07-29 | End: 2022-08-04 | Stop reason: HOSPADM

## 2022-07-29 RX ORDER — NALOXONE HYDROCHLORIDE 0.4 MG/ML
0.4 INJECTION, SOLUTION INTRAMUSCULAR; INTRAVENOUS; SUBCUTANEOUS
Status: DISCONTINUED | OUTPATIENT
Start: 2022-07-29 | End: 2022-08-04 | Stop reason: HOSPADM

## 2022-07-29 RX ORDER — NICOTINE POLACRILEX 4 MG
15-30 LOZENGE BUCCAL
Status: DISCONTINUED | OUTPATIENT
Start: 2022-07-29 | End: 2022-08-04 | Stop reason: HOSPADM

## 2022-07-29 RX ORDER — KETOROLAC TROMETHAMINE 30 MG/ML
30 INJECTION, SOLUTION INTRAMUSCULAR; INTRAVENOUS EVERY 6 HOURS
Status: DISCONTINUED | OUTPATIENT
Start: 2022-07-30 | End: 2022-07-29

## 2022-07-29 RX ORDER — SIMETHICONE 80 MG
80 TABLET,CHEWABLE ORAL 4 TIMES DAILY PRN
Status: DISCONTINUED | OUTPATIENT
Start: 2022-07-29 | End: 2022-08-04 | Stop reason: HOSPADM

## 2022-07-29 RX ORDER — CEFAZOLIN SODIUM/WATER 2 G/20 ML
2 SYRINGE (ML) INTRAVENOUS SEE ADMIN INSTRUCTIONS
Status: DISCONTINUED | OUTPATIENT
Start: 2022-07-29 | End: 2022-07-29 | Stop reason: HOSPADM

## 2022-07-29 RX ORDER — BISACODYL 10 MG
10 SUPPOSITORY, RECTAL RECTAL DAILY PRN
Status: DISCONTINUED | OUTPATIENT
Start: 2022-07-31 | End: 2022-08-04 | Stop reason: HOSPADM

## 2022-07-29 RX ORDER — NALOXONE HYDROCHLORIDE 0.4 MG/ML
0.2 INJECTION, SOLUTION INTRAMUSCULAR; INTRAVENOUS; SUBCUTANEOUS
Status: DISCONTINUED | OUTPATIENT
Start: 2022-07-29 | End: 2022-08-04 | Stop reason: HOSPADM

## 2022-07-29 RX ORDER — NALBUPHINE HYDROCHLORIDE 10 MG/ML
2.5-5 INJECTION, SOLUTION INTRAMUSCULAR; INTRAVENOUS; SUBCUTANEOUS EVERY 6 HOURS PRN
Status: DISCONTINUED | OUTPATIENT
Start: 2022-07-29 | End: 2022-08-04 | Stop reason: HOSPADM

## 2022-07-29 RX ORDER — OXYTOCIN/0.9 % SODIUM CHLORIDE 30/500 ML
340 PLASTIC BAG, INJECTION (ML) INTRAVENOUS CONTINUOUS PRN
Status: DISCONTINUED | OUTPATIENT
Start: 2022-07-29 | End: 2022-08-04 | Stop reason: HOSPADM

## 2022-07-29 RX ORDER — OXYTOCIN 10 [USP'U]/ML
10 INJECTION, SOLUTION INTRAMUSCULAR; INTRAVENOUS
Status: DISCONTINUED | OUTPATIENT
Start: 2022-07-29 | End: 2022-08-04 | Stop reason: HOSPADM

## 2022-07-29 RX ORDER — METOCLOPRAMIDE 10 MG/1
10 TABLET ORAL EVERY 6 HOURS PRN
Status: DISCONTINUED | OUTPATIENT
Start: 2022-07-29 | End: 2022-08-04 | Stop reason: HOSPADM

## 2022-07-29 RX ORDER — METOCLOPRAMIDE HYDROCHLORIDE 5 MG/ML
10 INJECTION INTRAMUSCULAR; INTRAVENOUS EVERY 6 HOURS PRN
Status: DISCONTINUED | OUTPATIENT
Start: 2022-07-29 | End: 2022-08-04 | Stop reason: HOSPADM

## 2022-07-29 RX ORDER — ONDANSETRON 2 MG/ML
4 INJECTION INTRAMUSCULAR; INTRAVENOUS EVERY 6 HOURS PRN
Status: DISCONTINUED | OUTPATIENT
Start: 2022-07-29 | End: 2022-08-04 | Stop reason: HOSPADM

## 2022-07-29 RX ORDER — ACETAMINOPHEN 325 MG/1
975 TABLET ORAL ONCE
Status: DISCONTINUED | OUTPATIENT
Start: 2022-07-29 | End: 2022-07-29 | Stop reason: HOSPADM

## 2022-07-29 RX ORDER — CARBOPROST TROMETHAMINE 250 UG/ML
250 INJECTION, SOLUTION INTRAMUSCULAR
Status: DISCONTINUED | OUTPATIENT
Start: 2022-07-29 | End: 2022-08-04 | Stop reason: HOSPADM

## 2022-07-29 RX ORDER — SODIUM CHLORIDE, SODIUM LACTATE, POTASSIUM CHLORIDE, CALCIUM CHLORIDE 600; 310; 30; 20 MG/100ML; MG/100ML; MG/100ML; MG/100ML
INJECTION, SOLUTION INTRAVENOUS
Status: DISPENSED
Start: 2022-07-29 | End: 2022-07-30

## 2022-07-29 RX ORDER — OXYCODONE HYDROCHLORIDE 5 MG/1
5 TABLET ORAL EVERY 4 HOURS PRN
Status: DISCONTINUED | OUTPATIENT
Start: 2022-07-29 | End: 2022-08-04 | Stop reason: HOSPADM

## 2022-07-29 RX ORDER — TRANEXAMIC ACID 10 MG/ML
1 INJECTION, SOLUTION INTRAVENOUS EVERY 30 MIN PRN
Status: DISCONTINUED | OUTPATIENT
Start: 2022-07-29 | End: 2022-07-29 | Stop reason: HOSPADM

## 2022-07-29 RX ORDER — PROCHLORPERAZINE MALEATE 10 MG
10 TABLET ORAL EVERY 6 HOURS PRN
Status: DISCONTINUED | OUTPATIENT
Start: 2022-07-29 | End: 2022-08-04 | Stop reason: HOSPADM

## 2022-07-29 RX ORDER — MISOPROSTOL 200 UG/1
400 TABLET ORAL
Status: DISCONTINUED | OUTPATIENT
Start: 2022-07-29 | End: 2022-08-04 | Stop reason: HOSPADM

## 2022-07-29 RX ORDER — ONDANSETRON 2 MG/ML
INJECTION INTRAMUSCULAR; INTRAVENOUS PRN
Status: DISCONTINUED | OUTPATIENT
Start: 2022-07-29 | End: 2022-07-29

## 2022-07-29 RX ORDER — ONDANSETRON 4 MG/1
4 TABLET, ORALLY DISINTEGRATING ORAL EVERY 6 HOURS PRN
Status: DISCONTINUED | OUTPATIENT
Start: 2022-07-29 | End: 2022-08-04 | Stop reason: HOSPADM

## 2022-07-29 RX ORDER — OXYTOCIN/0.9 % SODIUM CHLORIDE 30/500 ML
340 PLASTIC BAG, INJECTION (ML) INTRAVENOUS CONTINUOUS PRN
Status: DISCONTINUED | OUTPATIENT
Start: 2022-07-29 | End: 2022-07-29 | Stop reason: HOSPADM

## 2022-07-29 RX ORDER — OXYTOCIN 10 [USP'U]/ML
10 INJECTION, SOLUTION INTRAMUSCULAR; INTRAVENOUS
Status: DISCONTINUED | OUTPATIENT
Start: 2022-07-29 | End: 2022-07-29 | Stop reason: HOSPADM

## 2022-07-29 RX ORDER — METHYLERGONOVINE MALEATE 0.2 MG/ML
200 INJECTION INTRAVENOUS
Status: DISCONTINUED | OUTPATIENT
Start: 2022-07-29 | End: 2022-08-04 | Stop reason: HOSPADM

## 2022-07-29 RX ORDER — CARBOPROST TROMETHAMINE 250 UG/ML
250 INJECTION, SOLUTION INTRAMUSCULAR
Status: DISCONTINUED | OUTPATIENT
Start: 2022-07-29 | End: 2022-07-29 | Stop reason: HOSPADM

## 2022-07-29 RX ORDER — MISOPROSTOL 200 UG/1
400 TABLET ORAL
Status: DISCONTINUED | OUTPATIENT
Start: 2022-07-29 | End: 2022-07-29 | Stop reason: HOSPADM

## 2022-07-29 RX ORDER — PROCHLORPERAZINE 25 MG
25 SUPPOSITORY, RECTAL RECTAL EVERY 12 HOURS PRN
Status: DISCONTINUED | OUTPATIENT
Start: 2022-07-29 | End: 2022-08-04 | Stop reason: HOSPADM

## 2022-07-29 RX ORDER — OXYTOCIN/0.9 % SODIUM CHLORIDE 30/500 ML
340 PLASTIC BAG, INJECTION (ML) INTRAVENOUS CONTINUOUS PRN
Status: COMPLETED | OUTPATIENT
Start: 2022-07-29 | End: 2022-07-29

## 2022-07-29 RX ORDER — DEXTROSE, SODIUM CHLORIDE, SODIUM LACTATE, POTASSIUM CHLORIDE, AND CALCIUM CHLORIDE 5; .6; .31; .03; .02 G/100ML; G/100ML; G/100ML; G/100ML; G/100ML
INJECTION, SOLUTION INTRAVENOUS
Status: DISCONTINUED
Start: 2022-07-29 | End: 2022-07-29 | Stop reason: HOSPADM

## 2022-07-29 RX ORDER — EPINEPHRINE 1 MG/ML
INJECTION, SOLUTION, CONCENTRATE INTRAVENOUS
Status: DISCONTINUED | OUTPATIENT
Start: 2022-07-29 | End: 2022-07-29

## 2022-07-29 RX ORDER — CITRIC ACID/SODIUM CITRATE 334-500MG
30 SOLUTION, ORAL ORAL
Status: DISCONTINUED | OUTPATIENT
Start: 2022-07-29 | End: 2022-07-29 | Stop reason: HOSPADM

## 2022-07-29 RX ORDER — AMOXICILLIN 250 MG
1 CAPSULE ORAL 2 TIMES DAILY
Status: DISCONTINUED | OUTPATIENT
Start: 2022-07-29 | End: 2022-08-04 | Stop reason: HOSPADM

## 2022-07-29 RX ORDER — DIMENHYDRINATE 50 MG/ML
25 INJECTION, SOLUTION INTRAMUSCULAR; INTRAVENOUS
Status: DISCONTINUED | OUTPATIENT
Start: 2022-07-29 | End: 2022-07-29 | Stop reason: HOSPADM

## 2022-07-29 RX ORDER — CEFAZOLIN SODIUM/WATER 2 G/20 ML
2 SYRINGE (ML) INTRAVENOUS
Status: COMPLETED | OUTPATIENT
Start: 2022-07-29 | End: 2022-07-29

## 2022-07-29 RX ORDER — DEXTROSE MONOHYDRATE 25 G/50ML
25-50 INJECTION, SOLUTION INTRAVENOUS
Status: DISCONTINUED | OUTPATIENT
Start: 2022-07-29 | End: 2022-08-04 | Stop reason: HOSPADM

## 2022-07-29 RX ORDER — MISOPROSTOL 200 UG/1
800 TABLET ORAL
Status: DISCONTINUED | OUTPATIENT
Start: 2022-07-29 | End: 2022-08-04 | Stop reason: HOSPADM

## 2022-07-29 RX ORDER — FENTANYL CITRATE 50 UG/ML
INJECTION, SOLUTION INTRAMUSCULAR; INTRAVENOUS
Status: DISCONTINUED | OUTPATIENT
Start: 2022-07-29 | End: 2022-07-29

## 2022-07-29 RX ORDER — SODIUM CHLORIDE, SODIUM LACTATE, POTASSIUM CHLORIDE, CALCIUM CHLORIDE 600; 310; 30; 20 MG/100ML; MG/100ML; MG/100ML; MG/100ML
INJECTION, SOLUTION INTRAVENOUS
Status: DISCONTINUED
Start: 2022-07-29 | End: 2022-07-29 | Stop reason: HOSPADM

## 2022-07-29 RX ORDER — CITRIC ACID/SODIUM CITRATE 334-500MG
30 SOLUTION, ORAL ORAL
Status: COMPLETED | OUTPATIENT
Start: 2022-07-29 | End: 2022-07-29

## 2022-07-29 RX ORDER — HYDROXYZINE HYDROCHLORIDE 25 MG/1
25 TABLET, FILM COATED ORAL EVERY 6 HOURS PRN
Status: DISCONTINUED | OUTPATIENT
Start: 2022-07-29 | End: 2022-07-29 | Stop reason: HOSPADM

## 2022-07-29 RX ORDER — BUPIVACAINE HYDROCHLORIDE 7.5 MG/ML
INJECTION, SOLUTION INTRASPINAL
Status: DISCONTINUED | OUTPATIENT
Start: 2022-07-29 | End: 2022-07-29

## 2022-07-29 RX ADMIN — FENTANYL CITRATE 15 MCG: 50 INJECTION, SOLUTION INTRAMUSCULAR; INTRAVENOUS at 15:20

## 2022-07-29 RX ADMIN — ACETAMINOPHEN 975 MG: 325 TABLET, FILM COATED ORAL at 22:18

## 2022-07-29 RX ADMIN — BUPIVACAINE HYDROCHLORIDE IN DEXTROSE 1.6 ML: 7.5 INJECTION, SOLUTION SUBARACHNOID at 15:20

## 2022-07-29 RX ADMIN — PHENYLEPHRINE HYDROCHLORIDE 200 MCG: 10 INJECTION INTRAVENOUS at 15:37

## 2022-07-29 RX ADMIN — SODIUM CHLORIDE, POTASSIUM CHLORIDE, SODIUM LACTATE AND CALCIUM CHLORIDE: 600; 310; 30; 20 INJECTION, SOLUTION INTRAVENOUS at 13:02

## 2022-07-29 RX ADMIN — MISOPROSTOL 800 MCG: 200 TABLET ORAL at 16:41

## 2022-07-29 RX ADMIN — PHENYLEPHRINE HYDROCHLORIDE 50 MCG/MIN: 10 INJECTION INTRAVENOUS at 15:22

## 2022-07-29 RX ADMIN — SODIUM CHLORIDE, POTASSIUM CHLORIDE, SODIUM LACTATE AND CALCIUM CHLORIDE: 600; 310; 30; 20 INJECTION, SOLUTION INTRAVENOUS at 14:27

## 2022-07-29 RX ADMIN — Medication 2 G: at 17:19

## 2022-07-29 RX ADMIN — Medication 340 ML/HR: at 17:30

## 2022-07-29 RX ADMIN — PHENYLEPHRINE HYDROCHLORIDE 200 MCG: 10 INJECTION INTRAVENOUS at 15:35

## 2022-07-29 RX ADMIN — TRANEXAMIC ACID 1 G: 1 INJECTION, SOLUTION INTRAVENOUS at 15:59

## 2022-07-29 RX ADMIN — SODIUM CHLORIDE, POTASSIUM CHLORIDE, SODIUM LACTATE AND CALCIUM CHLORIDE 500 ML: 600; 310; 30; 20 INJECTION, SOLUTION INTRAVENOUS at 23:22

## 2022-07-29 RX ADMIN — PHENYLEPHRINE HYDROCHLORIDE 100 MCG: 10 INJECTION INTRAVENOUS at 15:26

## 2022-07-29 RX ADMIN — Medication 2 G: at 14:50

## 2022-07-29 RX ADMIN — CARBOPROST TROMETHAMINE 250 MCG: 250 INJECTION, SOLUTION INTRAMUSCULAR at 17:36

## 2022-07-29 RX ADMIN — ACETAMINOPHEN 975 MG: 325 TABLET, FILM COATED ORAL at 14:28

## 2022-07-29 RX ADMIN — SENNOSIDES AND DOCUSATE SODIUM 1 TABLET: 50; 8.6 TABLET ORAL at 22:19

## 2022-07-29 RX ADMIN — BUPIVACAINE HYDROCHLORIDE 20 ML: 2.5 INJECTION, SOLUTION EPIDURAL; INFILTRATION; INTRACAUDAL at 16:40

## 2022-07-29 RX ADMIN — OXYTOCIN-SODIUM CHLORIDE 0.9% IV SOLN 30 UNIT/500ML 300 ML/HR: 30-0.9/5 SOLUTION at 15:56

## 2022-07-29 RX ADMIN — ONDANSETRON 4 MG: 2 INJECTION INTRAMUSCULAR; INTRAVENOUS at 16:00

## 2022-07-29 RX ADMIN — EPINEPHRINE 0.2 MG: 1 INJECTION, SOLUTION, CONCENTRATE INTRAVENOUS at 15:20

## 2022-07-29 RX ADMIN — MORPHINE SULFATE 0.15 MG: 1 INJECTION EPIDURAL; INTRATHECAL; INTRAVENOUS at 15:20

## 2022-07-29 RX ADMIN — SIMETHICONE 80 MG: 80 TABLET, CHEWABLE ORAL at 22:18

## 2022-07-29 RX ADMIN — SODIUM CITRATE AND CITRIC ACID MONOHYDRATE 30 ML: 500; 334 SOLUTION ORAL at 14:28

## 2022-07-29 RX ADMIN — SODIUM CHLORIDE, SODIUM LACTATE, POTASSIUM CHLORIDE, CALCIUM CHLORIDE AND DEXTROSE MONOHYDRATE: 5; 600; 310; 30; 20 INJECTION, SOLUTION INTRAVENOUS at 13:56

## 2022-07-29 RX ADMIN — PHENYLEPHRINE HYDROCHLORIDE 200 MCG: 10 INJECTION INTRAVENOUS at 15:24

## 2022-07-29 RX ADMIN — BUPIVACAINE 20 ML: 13.3 INJECTION, SUSPENSION, LIPOSOMAL INFILTRATION at 16:40

## 2022-07-29 RX ADMIN — NALBUPHINE HYDROCHLORIDE 5 MG: 10 INJECTION, SOLUTION INTRAMUSCULAR; INTRAVENOUS; SUBCUTANEOUS at 19:13

## 2022-07-29 RX ADMIN — SODIUM CHLORIDE, POTASSIUM CHLORIDE, SODIUM LACTATE AND CALCIUM CHLORIDE: 600; 310; 30; 20 INJECTION, SOLUTION INTRAVENOUS at 15:52

## 2022-07-29 ASSESSMENT — ACTIVITIES OF DAILY LIVING (ADL)
ADLS_ACUITY_SCORE: 20

## 2022-07-29 ASSESSMENT — LIFESTYLE VARIABLES: TOBACCO_USE: 0

## 2022-07-29 NOTE — PLAN OF CARE
Goal Outcome Evaluation:  OR to PACU Transfer Note  Data: Pt to OB PACU at 1700 via cart. PIV infusing NS with pitocin. Pt without complications, renae with clear urine to gravity, vitals WNL, pt does not complain of pain and/or nausea. TAP block done in OR.   Interventions: IV to pump, monitors and alarms on, warm blankets, SCD on.  Response: stable.  at bedside.   Plan: Patient instructed to notify RN for pain or nausea, routine post op cares, initiate breastfeeding/pumping as soon as patient/infant able.

## 2022-07-29 NOTE — DISCHARGE SUMMARY
[facility] Discharge Summary    Melissa Albert MRN# [id number]   Age: 37 year old YOB: 1985     Date of Admission:  2022  Date of Discharge::  22    Admitting Physician:  Oliva Hollingsworth MD  Discharge Physician:  Gabi Rausch MD             Admission Diagnoses:     IUP at 37w1d  History of myomectomy  Mild fetal micrognathia  Resolved polyhydramnios  Type 2 diabetes  Obesity  Asthma  Suspected cHTN  AMA  MDD          Discharge Diagnosis:   - Same s/p CS  - Postpartum s/p PLTCS  - Preeclampsia with Severe Features            Procedures:     Procedure(s): Primary low segment transverse  section via Pfannenstiel skin incision with two layer uterine closure  - Spinal anesthesia  - Tap Block                 Medications Prior to Admission:     No medications prior to admission.             Discharge Medications:        Review of your medicines      START taking      Dose / Directions   acetaminophen 325 MG tablet  Commonly known as: TYLENOL  Used for: S/P  section      Dose: 650 mg  Take 2 tablets (650 mg) by mouth every 6 hours as needed for mild pain Start after Delivery.  Quantity: 100 tablet  Refills: 0     ferrous sulfate 325 (65 Fe) MG tablet  Commonly known as: FEROSUL  Used for: S/P  section, Anemia due to blood loss, acute      Dose: 325 mg  Take 1 tablet (325 mg) by mouth daily (with breakfast)  Quantity: 100 tablet  Refills: 0     hydrochlorothiazide 25 MG tablet  Commonly known as: HYDRODIURIL  Used for: Hypertension in pregnancy, delivered      Dose: 25 mg  Take 1 tablet (25 mg) by mouth daily  Quantity: 10 tablet  Refills: 0     ibuprofen 600 MG tablet  Commonly known as: ADVIL/MOTRIN  Used for: S/P  section      Dose: 600 mg  Take 1 tablet (600 mg) by mouth every 6 hours as needed for moderate pain Start after delivery  Quantity: 60 tablet  Refills: 0     labetalol 200 MG tablet  Commonly known as: NORMODYNE  Used for: S/P   section, Hypertension in pregnancy, delivered      Dose: 200 mg  Take 1 tablet (200 mg) by mouth every 12 hours  Quantity: 60 tablet  Refills: 1     NIFEdipine ER 90 MG 24 hr tablet  Commonly known as: ADALAT CC  Used for: S/P  section      Dose: 90 mg  Take 1 tablet (90 mg) by mouth every 24 hours  Quantity: 40 tablet  Refills: 0     oxyCODONE 5 MG tablet  Commonly known as: ROXICODONE  Used for: Acute post-operative pain      Dose: 5 mg  Take 1 tablet (5 mg) by mouth every 6 hours as needed for pain  Quantity: 6 tablet  Refills: 0     senna-docusate 8.6-50 MG tablet  Commonly known as: SENOKOT-S/PERICOLACE  Used for: S/P  section      Dose: 1 tablet  Take 1 tablet by mouth daily Start after delivery.  Quantity: 100 tablet  Refills: 0        CONTINUE these medicines which have NOT CHANGED      Dose / Directions   FreeStyle Emile 14 Day Sensor Misc      CHANGE EVERY 14 DAYS  Refills: 0     PRENATAL 1 PO      Refills: 0        STOP taking    aspirin 81 MG EC tablet  Commonly known as: ASA        HumaLOG KWIKpen 100 UNIT/ML (1 unit dial) KWIKPEN  Generic drug: insulin lispro        insulin glargine 100 UNIT/ML pen  Commonly known as: LANTUS PEN        insulin lispro 100 UNIT/ML (1 unit dial) KWIKPEN  Commonly known as: HumaLOG KWIKPEN        metFORMIN 500 MG 24 hr tablet  Commonly known as: GLUCOPHAGE XR        ondansetron 4 MG ODT tab  Commonly known as: ZOFRAN ODT        Tresiba FlexTouch 200 UNIT/ML pen  Generic drug: insulin degludec              Where to get your medicines      These medications were sent to [facility] [place], [place] - [address]  [address] [address]    Phone: [phone]     acetaminophen 325 MG tablet    ferrous sulfate 325 (65 Fe) MG tablet    hydrochlorothiazide 25 MG tablet    ibuprofen 600 MG tablet    labetalol 200 MG tablet    NIFEdipine ER 90 MG 24 hr tablet    oxyCODONE 5 MG tablet    senna-docusate 8.6-50 MG  tablet               Consultations:   Anesthesiology          Brief Admission and Intrapartum History:   Melissa Alebrt is a 37 year old now  who presented at 37w1d for her scheduled primary CS for a history of a myomectomy. She planned delivery at [facility] for fetal micrognathia.        Intraoperative course   The procedure was uncomplicated.  EBL 1615 mL.  See operative report for details.     Intraoperative findings  1. Single, viable male infant at 1555 on 2022. Apgars of 6 and 8 at one and five minutes.  Birth weight: 3730 g.  Fetal presentation: ALLIE. Amniotic fluid: clear, large amount.    2. Arterial Cord pH 7.15 with base excess -7.9, venous pH 7.19, base excess -6.2.    3. Placenta intact with 3 vessel cord.     4. Normal appearing uterus, fallopian tubes, ovaries.   5.  Filmy adhesion of the right posterior fundus. No other intraabdominal adhesions.  Moderate to dense rectofascial adhesions. No abdominal wall adhesions       Postpartum Course   The patient's hospital course was complicated by remarkable for preeclampsia with severe features. She received 24 hours of magnesium and one dose of IV labetalol.Her HELLP labs remained normal and she was started on labetalol 200 mg BID and 10 days of HTCZ, which she was discharged on.     From a post-operative standpoint, patient required 2 unitsof pRBCs due to a hemoglobin at 7.1 in the setting of a post partum hemorrhage, followed by IV Iron.  On discharge, her pain was well controlled. Vaginal bleeding was appropriate for postpartum period.  Voiding without difficulty.  Ambulating well and tolerating a normal diet without nausea or emesis.  No fever or significant wound drainage.  Breastfeeding well.  Infant is stable.  Passing flatus and  bowel movement.      She was discharged on post-partum day #6    She was undecided on what she would use  for contraception.    Rhogam not indicated.     Post-partum hemoglobin:   Hemoglobin   Date Value Ref Range Status    08/03/2022 9.8 (L) 11.7 - 15.7 g/dL Final   07/08/2018 8.1 (L) 11.7 - 15.7 g/dL Final             Discharge Instructions and Follow-Up:     Discharge diet: Regular   Discharge activity: No lifting greater than 20 lbs, pushing, pulling, or other strenuous activity for 6 weeks. Blood pressure check in 1 week and patient was sent home on a blood pressure cuff. Pelvic rest for 6 weeks including no sexual intercourse, tampons, or douching. No driving until you can slam on the brakes without pain or while on narcotic pain medications.    Discharge follow-up: Follow up with primary OB for routine postpartum visit in 6 weeks     Wound care: Keep incision clean and dry           Discharge Disposition:     Discharged to home   Dyan Juárez MD  OB/GYN Resident, PGY-2         I have seen, examined, and counseled the patient on the day of discharge. I have reviewed and edited the summary.  Gabi Rausch

## 2022-07-29 NOTE — ANESTHESIA PROCEDURE NOTES
Intrathecal injection Procedure Note    Pre-Procedure   Staff -        Anesthesiologist:  Dahlia Giron MD       Resident/Fellow: James Garza MD       Performed By: anesthesiologist       Location: OR       Procedure Start/Stop Times: 7/29/2022 3:00 PM and 7/29/2022 3:21 PM       Pre-Anesthestic Checklist: patient identified, IV checked, risks and benefits discussed, informed consent, monitors and equipment checked, pre-op evaluation, at physician/surgeon's request and post-op pain management  Timeout:       Correct Patient: Yes        Correct Procedure: Yes        Correct Site: Yes        Correct Position: Yes   Procedure Documentation  Procedure: intrathecal injection       Diagnosis: c/section       Patient Position: sitting       Skin prep: Chloraprep       Insertion Site: L2-3. (midline approach).       Needle Gauge: 25.        Needle Length (Inches): 5        Spinal Needle Type: Pencan       Introducer used       Introducer: 20 G       # of attempts: 3 and  # of redirects:  0    Assessment/Narrative         Paresthesias: Resolved.       CSF fluid: clear.       Opening pressure was cmH2O while  Sitting.      Medication(s) Administered   0.75% Hyperbaric Bupivacaine (Intrathecal) - Intrathecal   1.6 mL - 7/29/2022 3:20:00 PM  Epinephrine 1000 mcg/mL (Intrathecal) - Intrathecal   0.2 mg - 7/29/2022 3:20:00 PM  Fentanyl PF (Intrathecal) - Intrathecal   15 mcg - 7/29/2022 3:20:00 PM  Morphine PF 1 mg/mL (Intrathecal) - Intrathecal   0.15 mg - 7/29/2022 3:20:00 PM  Medication Administration Time: 7/29/2022 3:00 PM     Comments:  2 attempts by resident: #1 at L3-4 with pencan 25G, 3inch needle. #2 same space with 25G, 5 inch needle  1 attempt by staf at L2-3 with sprotte 25G 5inch needle. Clear CSF.   Procedure tolerated well.

## 2022-07-29 NOTE — ANESTHESIA PREPROCEDURE EVALUATION
Anesthesia Pre-Procedure Evaluation    Patient: Melissa Albert   MRN: [id number] : 1985        Procedure : Procedure(s):   SECTION          Past Medical History:   Diagnosis Date     Depressive disorder      Joint pain       Past Surgical History:   Procedure Laterality Date     CHOLECYSTECTOMY       LAPAROSCOPY DIAGNOSTIC (GYN)  2018    Procedure: LAPAROSCOPY DIAGNOSTIC (GYN);  Diagnostic Laparoscopy with conversion to laparotomy with removal of incarcerated fibroid;  Surgeon: Makayla Damian DO;  Location: [facility] OR     LAPAROTOMY EXPLORATORY N/A 2018    Procedure: LAPAROTOMY EXPLORATORY;;  Surgeon: Makayla Damian DO;  Location: [facility] OR     LUMBAR PUNCTURE FLUORO GUIDED DIAGNOSTIC  3/22/2019      Allergies   Allergen Reactions     Nsaids GI Disturbance     Organic Nitrates Other (See Comments)     Headache, nausea      Social History     Tobacco Use     Smoking status: Never Smoker     Smokeless tobacco: Never Used   Substance Use Topics     Alcohol use: Yes      Wt Readings from Last 1 Encounters:   18 99.8 kg (220 lb)        Anesthesia Evaluation   Pt has had prior anesthetic. Type: General and MAC.    No history of anesthetic complications       ROS/MED HX  ENT/Pulmonary:     (+) Intermittent, asthma  (-) tobacco use   Neurologic:     (+) migraines,     Cardiovascular:  - neg cardiovascular ROS     METS/Exercise Tolerance:     Hematologic: Comments:   Iron deficiency anemia      Musculoskeletal: Comment:   Patellofemoral pain syndrome  Joint Pain  Fibromyalgia  Spondylolisthesis - level?          GI/Hepatic: Comment:   Prior abdominal sx- laparotomy, cholecystectomy  Prior pelvic Sx - Diagnostic laparoscopy      Renal/Genitourinary:       Endo: Comment:   T2DM, insulin requiring    (+) type I DM, Obesity,     Psychiatric/Substance Use:     (+) psychiatric history depression     Infectious Disease: Comment:   COVID (-) 22 - neg infectious disease ROS     Malignancy:  - neg  malignancy ROS     Other:            Physical Exam    Airway  airway exam normal           Respiratory Devices and Support         Dental  no notable dental history         Cardiovascular   cardiovascular exam normal          Pulmonary   pulmonary exam normal                OUTSIDE LABS:  CBC:   Lab Results   Component Value Date    WBC 9.6 07/06/2018    WBC 13.7 (H) 07/05/2018    HGB 10.1 (L) 07/29/2022    HGB 8.1 (L) 07/08/2018    HCT 20.3 (L) 07/06/2018    HCT 23.1 (L) 07/05/2018     07/06/2018     07/05/2018     BMP:   Lab Results   Component Value Date     07/04/2018    POTASSIUM 4.5 07/04/2018    CHLORIDE 103 07/04/2018    CO2 29 07/04/2018    BUN 11 07/04/2018    CR 0.75 07/04/2018    GLC 82 07/29/2022     (H) 07/04/2018     COAGS: No results found for: PTT, INR, FIBR  POC:   Lab Results   Component Value Date    HCGS Negative 07/04/2018     HEPATIC:   Lab Results   Component Value Date    ALBUMIN 3.4 07/04/2018    PROTTOTAL 7.5 07/04/2018    ALT 33 07/04/2018    AST 16 07/04/2018    ALKPHOS 79 07/04/2018    BILITOTAL 0.2 07/04/2018     OTHER:   Lab Results   Component Value Date    HECTOR 8.9 07/04/2018    LIPASE 76 07/04/2018       Anesthesia Plan    ASA Status:  3   NPO Status:  NPO Appropriate    Anesthesia Type: Spinal.              Consents    Anesthesia Plan(s) and associated risks, benefits, and realistic alternatives discussed. Questions answered and patient/representative(s) expressed understanding.    - Discussed:     - Discussed with:  Patient      - Extended Intubation/Ventilatory Support Discussed: No.      - Patient is DNR/DNI Status: No    Use of blood products discussed: No .     Postoperative Care    Pain management: Oral pain medications, Multi-modal analgesia, Peripheral nerve block (Continuous), intrathecal morphine.   PONV prophylaxis: Ondansetron (or other 5HT-3), Dexamethasone or Solumedrol     Comments:           H&P reviewed: Unable to attach H&P to encounter  due to EHR limitations. H&P Update: appropriate H&P reviewed, patient examined. No interval changes since H&P (within 30 days).         Dahlia Avery MD

## 2022-07-29 NOTE — ANESTHESIA POSTPROCEDURE EVALUATION
Patient: Melissa Albert    Procedure: Procedure(s):   SECTION       Anesthesia Type:  Spinal    Note:  Disposition: Inpatient   Postop Pain Control: Uneventful            Sign Out: Well controlled pain   PONV:    Neuro/Psych: Uneventful            Sign Out: Acceptable/Baseline neuro status   Airway/Respiratory: Uneventful            Sign Out: Acceptable/Baseline resp. status   CV/Hemodynamics: Uneventful            Sign Out: Acceptable CV status; No obvious hypovolemia; No obvious fluid overload   Other NRE:    DID A NON-ROUTINE EVENT OCCUR?            Last vitals:  Vitals Value Taken Time   /71 22 1745   Temp 36.8  C (98.3  F) 22 1702   Pulse 90 22 1801   Resp 24 22 1702   SpO2 100 % 22 1801   Vitals shown include unvalidated device data.    Electronically Signed By: Trae Vergara MD  2022  6:01 PM

## 2022-07-29 NOTE — OP NOTE
[facility]   OPERATIVE NOTE:  SECTION     Surgery Date:  2022  p  Surgeon(s): Oliva Hollingsworth MD  Assistants: Desean Bonds MD, PGY2    Preoperative Diagnoses:   at 37w1d  History of myomectomy  Mild fetal micrognathia  Resolved polyhydramnios  Type 2 diabetes  Obesity  Asthma  Suspected cHTN    Postoperative diagnoses:   at 37w1d, now delivered  Postpartum hemorrhage    Procedure performed:  Primary low segment transverse  section via Pfannenstiel skin incision with two layer uterine closure    Anesthesia:  Spinal with duramorph  Est Blood Loss (mL): 1615 mL  Fluid replacement: 1700 mL crystalloid.   Urine output: 50 mL clear urine  Specimens: cord blood, cord segment  Complications: postpartum hemorrhage      Operative findings:   1. Single, viable male infant at 1555 on 2022. Apgars of 6 and 8 at one and five minutes.  Birth weight: 3730 g.  Fetal presentation: ALLIE. Amniotic fluid: clear, large amount.    2. Arterial Cord pH 7.15 with base excess -7.9, venous pH 7.19, base excess -6.2.    3. Placenta intact with 3 vessel cord.     4. Normal appearing uterus, fallopian tubes, ovaries.   5.  Filmy adhesion of the right posterior fundus. No other intraabdominal adhesions.  Moderate to dense rectofascial adhesions. No abdominal wall adhesions    Indication: Melissa Albert is a 37 year old, , who was admitted at 37w0d for scheduled CS for history of a myomectomy.  Infant has suspected fetal micrognathia on US. The risks, benefits, and alternatives of  delivery were explained and the patient agreed to proceed.     Procedure details:    The patient was taken to the operating room. She received ancef prior to the skin incision. She was placed in the dorsal supine position with a leftward tilt and prepped and draped in the usual sterile fashion.     Following test of adequate spinal anesthesia, the abdomen was entered through a transverse  skin incision. The skin incision was made sharply and carried through the subcutaneous tissue to the fascia.  Fascia was incised in the midline and extended laterally with the Gillespie scissors.  The superior margin of the fascial incision was grasped with Kocher clamps and dissected from the underlying muscle with sharp and blunt dissection. There were moderate to dense adhesions that were incised with scissors and the scalpel. This was was then repeated at the lower margin of the fascial incision.  The muscle was  in the midline.  The peritoneum was entered sharply. The peritoneum was dense. The opening was extended by digital and sharp dissection with care to avoid the bladder. A bladder blade was placed. The lower segment of the uterus was opened sharply in a transverse fashion and extended with digital pressure. The infant's head was noted to be in the ALLIE position. It was elevated to the level of the hysterotomy and was delivered atraumatically, shoulders delivered easily thereafter. The cord was doubly clamped after 60 seconds and cut and the infant was handed off to the waiting NICU staff. A segment of the cord was cut and set aside for cord gases if needed.     The placenta was expressed.  The uterus was exteriorized from the abdomen and cleared of all clots and debris.  The uterus was massaged and was noted to be firm.  Pitocin was given through the running IV, and TXA was administered. With vigorous massage as well as administration of pitocin, good uterine tone was achieved. The hysterotomy was repaired with 0-vicryl suture in a running locked fashion. A 2nd layer of 0-vicryl was  used to imbricate the incision and good hemostasis was achieved. The posterior cul-de-sac was cleared of all clots and the uterus was returned to the abdomen.      The bilateral pericolic gutters were cleared of all clots.  The hysterotomy was again inspected and found to be hemostatic.  The abdominal wall was examined and  also found to be hemostatic with cautery.  The fascia was closed with a running suture of 0-looped PDS.  Subcutaneous tissue was irrigated. Areas that were not hemostatic were controlled with cautery. The subcutaneous tissue was greater than 3cm in depth and was re approximated with 3-0 vicryl. The skin was closed with 4-0 vicryl and skin glue. The patient tolerated the procedure well and was taken to the recovery room in stable condition. All sponge, needle and instrument counts were correct x2. On fundal massage, there was approximately 200mL blood and clot was expressed from there uterus. PA misoprostol was then placed. Due to QBL >1500, ancef was re-dosed.      Dr. Oliva Hollingsworth was present for the entire procedure.     Desean Bonds MD  OB/GYN PGY-3  07/28/2022 9:49 PM

## 2022-07-29 NOTE — BRIEF OP NOTE
Brief Operative Note    Name: Melissa Albert  MRN: [id number]  : 1985  Date of Surgery: 2022    Pre-operative Diagnosis:  IUP @ 37w0d  History of myomectomy  Mild fetal micrognathia  Resolved polyhydramnios  Type 2 diabetes  Obesity  Asthma  Suspected cHTN    Post-operative Diagnosis:  Postpartum s/p delivery of viable male infant  Postpartum hemorrhage    Procedure(s):   Primary low transverse  section with double layer uterine closure via Pfannenstiel skin incision    Surgeon:  Oliva Hollingsworth MD    Assistants:  Desean Bonds MD, PGY2       Anesthesia: Spinal  QBL: 1615 mL  UOP: 50 mL clear urine  Fluids: 1700 mL crystalloid  Additional Medications: 2g Ancef preop, TXA, DE miso  Specimens: Cord blood  Complications: None apparent    Findings:   - Viable male infant delivered at 1555 on 2022 with APGARs 6 and 8. Weight pending.   - Cord gasses: pending.   - clear amniotic fluid, Placenta intact.   - Normal uterus, tubes, and ovaries.   - dense rectofascial adhesions. Filmy adhesion at the posterior uterus.   - Surgical sites noted to be hemostatic at the end of case.     Desean Bonds MD  OB/GYN PGY-3  2022 9:39 PM

## 2022-07-29 NOTE — H&P
Ob/Gyn History and Physical   2022  Melissa Albert  [id number]      HPI: Melissa Albert is a 37 year old  at 37w1d by IVF dating who presents for scheduled  section.     She states that she is feeling well today.  She denies headache, vision changes, chest pain, shortness of breath, fever, chills, nausea, vomiting or other systemic complaints. She denies vaginal bleeding or loss of fluid and is feeling normal fetal movement.      Her pregnancy has been complicated by:  - history of myomectomy  - mild fetal micrognathia  - resolved poly hydramnios  - Type 2 diabetes  - IVF pregnancy  - MDD  - asthma  - AMA  - suspected cHTN    ROS: No headaches, vision changes, nausea, vomiting, fevers, chills, chest pain, SOB, abdominal pain, constipation, diarrhea, dysuria, changes in vaginal discharge or edema in extremities noted.     OBHX:   OB History    Para Term  AB Living   1 0 0 0 0 0   SAB IAB Ectopic Multiple Live Births   0 0 0 0 0      # Outcome Date GA Lbr Elan/2nd Weight Sex Delivery Anes PTL Lv   1 Current                Past Medical History:   Diagnosis Date     Depressive disorder      Joint pain        Past Surgical History:   Procedure Laterality Date     CHOLECYSTECTOMY       LAPAROSCOPY DIAGNOSTIC (GYN)  2018    Procedure: LAPAROSCOPY DIAGNOSTIC (GYN);  Diagnostic Laparoscopy with conversion to laparotomy with removal of incarcerated fibroid;  Surgeon: Makayla Damian DO;  Location: [facility] OR     LAPAROTOMY EXPLORATORY N/A 2018    Procedure: LAPAROTOMY EXPLORATORY;;  Surgeon: Makayla Damian DO;  Location: [facility] OR     LUMBAR PUNCTURE FLUORO GUIDED DIAGNOSTIC  3/22/2019       Medications:   No current facility-administered medications on file prior to encounter.  aspirin (ASA) 81 MG EC tablet, Take 81 mg by mouth  Biotin 7500 MCG TABS, Take 1,500 mg by mouth (Patient not taking: Reported on 2022)  cholecalciferol (VITAMIN D3) 10 mcg (400 units) TABS tablet, Take 400  mg by mouth (Patient not taking: Reported on 6/27/2022)  coenzyme Q-10 10 MG CAPS,   Continuous Blood Gluc Sensor (FREESTYLE SONJA 14 DAY SENSOR) [facility] [place], CHANGE EVERY 14 DAYS (Patient not taking: Reported on 6/27/2022)  ferrous sulfate (IRON) 325 (65 Fe) MG tablet, Take 1 tablet (325 mg) by mouth 2 times daily (Patient not taking: Reported on 6/27/2022)  FLUoxetine HCl (PROZAC PO), Take 40 mg by mouth daily  (Patient not taking: Reported on 6/27/2022)  HUMALOG KWIKPEN 100 UNIT/ML soln,   hydroxychloroquine (PLAQUENIL) 200 MG tablet, Take 400 mg by mouth daily  (Patient not taking: Reported on 6/27/2022)  insulin degludec (TRESIBA FLEXTOUCH) 200 UNIT/ML pen, ADMINISTER UP TO 40 UNITS UNDER THE SKIN DAILY AS DIRECTED (Patient not taking: Reported on 6/27/2022)  insulin glargine (LANTUS PEN) 100 UNIT/ML pen, Inject 10 Units Subcutaneous  insulin lispro (HUMALOG KWIKPEN) 100 UNIT/ML (1 unit dial) KWIKPEN, Inject 1-10 units subcutaneous 3 times daily before meals (Patient not taking: Reported on 6/27/2022)  metFORMIN (GLUCOPHAGE XR) 500 MG 24 hr tablet,   ondansetron (ZOFRAN ODT) 4 MG ODT tab, DISSOLVE 1 TABLET ON THE TONGUE EVERY 8 HOURS AS NEEDED  Prenatal MV-Min-Fe Fum-FA-DHA (PRENATAL 1 PO),   Probiotic Product (MISC INTESTINAL RONALD REGULAT) CAPS,   promethazine (PHENERGAN) 25 MG tablet, TAKE 1 TABLET BY MOUTH EVERY 4 HOURS AS NEEDED        Allergies   Allergen Reactions     Nsaids GI Disturbance     Organic Nitrates Other (See Comments)     Headache, nausea       No family history on file.    SocialHX:   Social History     Substance Use Topics     Alcohol use: Yes     Drug use: No       ROS: 10-point ROS negative except as indicated in HPI.    Physical Exam:  There were no vitals filed for this visit.  General: alert, oriented female, resting in bed in NAD  CV: regular rate and rhythm,  Lungs: clear bilaterally, no crackles or wheezes.   Abdomen: soft, gravid, non-tender.  Extremities: bilateral lower extremities  non-tender with no edema    FHT: baseline 150, moderate variability, + accelerations, no decelerations  [place]: 1-2 contractions in 10 minutes    Prenatal Labs:   Lab Results   Component Value Date    ABO A 2018    RH Pos 2018    AS Neg 2018    HGB 8.1 (L) 2018       GBS Status:   No results found for: GBS    No results found for: PAP    Labs: No results found for this or any previous visit (from the past 24 hour(s)).    Assessment/Plan: 37 year old  at 37w1d by IVF dating, here for scheduled repeat  section for history of a myomectomy. Pregnancy also complicated by: T2DM, fetal micrognathia, resolved poly, asthma, suspected cHTN    # scheduled repeat  section  - history of myomectomy, was recommended to have CS at 37w  - Labs: CBC, T&S, RPR  - antibiotic ppx: ancef 2g  - anesthesia: spinal  - PPH Meds: possible contraindications to both methergine and hemabate.   - The risks, benefits, and alternatives of  section were discussed, including the risks of bleeding, infection, injury to surrounding organs requiring future surgery, fetal injury, and remote risks of hysterectomy. She consented to a blood transfusion in the event of a life threatening amount of bleeding. She had time to ask questions and agreed to proceed. Surgical consent was signed.    # fetal micrognathia  # resolved polyhydramnios  - s/p peds ENT consult  - plan for NICU at delivery    # T2DM  - PTA lantus 14U at bedtime< novolog 1U:6g CHO  - per endocrinologist, plan to stop insulin after delivery and monitor BG  - she will send BG to her endocrinologist 2w PP, f/up in their clinic in 3 months    # suspected cHTN  - on review of her BP, she has had mild range BP prior to pregnancy and at 11w. Continued to have some elevated BP after 20 weeks.   - HELLP labs pending  - serial BP monitoring  - asymptomatic.     # Fetal well-being  - Category 1 FHT  - continuous fetal monitoring  - intrauterine  resuscitation PRN    Patient discussed with Dr. Hollingsworth.    Desean Bonds MD  OB/GYN PGY-3  07/28/2022 9:32 PM

## 2022-07-29 NOTE — ANESTHESIA CARE TRANSFER NOTE
Patient: Melissa Albert    Procedure: Procedure(s):   SECTION       Diagnosis: Type 2 diabetes mellitus complicating pregnancy in third trimester, antepartum [O24.113]  Diagnosis Additional Information: No value filed.    Anesthesia Type:   Spinal     Note:    Oropharynx: oropharynx clear of all foreign objects and spontaneously breathing  Level of Consciousness: awake  Oxygen Supplementation: room air    Independent Airway: airway patency satisfactory and stable  Dentition: dentition unchanged  Vital Signs Stable: post-procedure vital signs reviewed and stable  Report to RN Given: handoff report given  Patient transferred to: Labor and Delivery    Handoff Report: Identifed the Patient, Identified the Reponsible Provider, Reviewed the pertinent medical history, Discussed the surgical course, Reviewed Intra-OP anesthesia mangement and issues during anesthesia, Set expectations for post-procedure period and Allowed opportunity for questions and acknowledgement of understanding      Vitals:  Vitals Value Taken Time   /42 22 1702   Temp     Pulse 106 22 1704   Resp 16    SpO2 97 % 22 1704   Vitals shown include unvalidated device data.    Electronically Signed By: JAKE Oreilly CRNA  2022  5:05 PM

## 2022-07-29 NOTE — ANESTHESIA PROCEDURE NOTES
TAP Procedure Note    Pre-Procedure   Staff -        Anesthesiologist:  Trae Vergara MD       Performed By: anesthesiologist       Location: OR       Procedure Start/Stop Times: 7/29/2022 4:40 PM and 7/29/2022 4:50 PM       Pre-Anesthestic Checklist: patient identified, IV checked, site marked, risks and benefits discussed, informed consent, monitors and equipment checked, pre-op evaluation, at physician/surgeon's request and post-op pain management  Timeout:       Correct Patient: Yes        Correct Procedure: Yes        Correct Site: Yes        Correct Position: Yes        Correct Laterality: Yes        Site Marked: Yes  Procedure Documentation  Procedure: TAP       Diagnosis: C/S       Laterality: bilateral       Patient Position: supine       Skin prep: Chloraprep       Needle Type: short bevel       Needle Gauge: 21.        Needle Length (millimeters): 110        Ultrasound guided       1. Ultrasound was used to identify targeted nerve, plexus, vascular marker, or fascial plane and place a needle adjacent to it in real-time.       2. Ultrasound was used to visualize the spread of anesthetic in close proximity to the above referenced structure.       3. A permanent image is entered into the patient's record.       4. The visualized anatomic structures appeared normal.       5. There were no apparent abnormal pathologic findings.    Assessment/Narrative         The placement was negative for: blood aspirated, painful injection and site bleeding       Paresthesias: No.       Bolus given via needle..        Secured via.        Insertion/Infusion Method: Single Shot       Complications: none       Injection made incrementally with aspirations every 5 mL.    Medication(s) Administered   Bupivacaine 0.25% PF (Infiltration) - Infiltration   20 mL - 7/29/2022 4:40:00 PM  Bupivacaine liposome (Exparel) 1.3% LA inj susp (Infiltration) - Infiltration   20 mL - 7/29/2022 4:40:00 PM  Medication Administration Time:  7/29/2022 4:40 PM     Comments:  266mg Exparel injected

## 2022-07-30 LAB
ALT SERPL W P-5'-P-CCNC: <6 U/L (ref 0–50)
AST SERPL W P-5'-P-CCNC: 18 U/L (ref 0–45)
BLD PROD TYP BPU: NORMAL
BLOOD COMPONENT TYPE: NORMAL
CODING SYSTEM: NORMAL
CREAT SERPL-MCNC: 0.46 MG/DL (ref 0.52–1.04)
CROSSMATCH: NORMAL
GFR SERPL CREATININE-BSD FRML MDRD: >90 ML/MIN/1.73M2
GLUCOSE BLDC GLUCOMTR-MCNC: 108 MG/DL (ref 70–99)
GLUCOSE BLDC GLUCOMTR-MCNC: 140 MG/DL (ref 70–99)
GLUCOSE BLDC GLUCOMTR-MCNC: 141 MG/DL (ref 70–99)
HGB BLD-MCNC: 7.1 G/DL (ref 11.7–15.7)
HGB BLD-MCNC: 7.6 G/DL (ref 11.7–15.7)
HGB BLD-MCNC: 7.8 G/DL (ref 11.7–15.7)
ISSUE DATE AND TIME: NORMAL
PLATELET # BLD AUTO: 281 10E3/UL (ref 150–450)
UNIT ABO/RH: NORMAL
UNIT NUMBER: NORMAL
UNIT STATUS: NORMAL
UNIT TYPE ISBT: 6200

## 2022-07-30 PROCEDURE — 84460 ALANINE AMINO (ALT) (SGPT): CPT | Performed by: STUDENT IN AN ORGANIZED HEALTH CARE EDUCATION/TRAINING PROGRAM

## 2022-07-30 PROCEDURE — P9016 RBC LEUKOCYTES REDUCED: HCPCS | Performed by: STUDENT IN AN ORGANIZED HEALTH CARE EDUCATION/TRAINING PROGRAM

## 2022-07-30 PROCEDURE — 85018 HEMOGLOBIN: CPT

## 2022-07-30 PROCEDURE — 36415 COLL VENOUS BLD VENIPUNCTURE: CPT

## 2022-07-30 PROCEDURE — 36415 COLL VENOUS BLD VENIPUNCTURE: CPT | Performed by: OBSTETRICS & GYNECOLOGY

## 2022-07-30 PROCEDURE — 85018 HEMOGLOBIN: CPT | Performed by: OBSTETRICS & GYNECOLOGY

## 2022-07-30 PROCEDURE — 82565 ASSAY OF CREATININE: CPT | Performed by: STUDENT IN AN ORGANIZED HEALTH CARE EDUCATION/TRAINING PROGRAM

## 2022-07-30 PROCEDURE — 84450 TRANSFERASE (AST) (SGOT): CPT | Performed by: STUDENT IN AN ORGANIZED HEALTH CARE EDUCATION/TRAINING PROGRAM

## 2022-07-30 PROCEDURE — 120N000002 HC R&B MED SURG/OB UMMC

## 2022-07-30 PROCEDURE — 250N000013 HC RX MED GY IP 250 OP 250 PS 637: Performed by: OBSTETRICS & GYNECOLOGY

## 2022-07-30 PROCEDURE — 85049 AUTOMATED PLATELET COUNT: CPT | Performed by: STUDENT IN AN ORGANIZED HEALTH CARE EDUCATION/TRAINING PROGRAM

## 2022-07-30 RX ORDER — ACETAMINOPHEN 325 MG/1
650 TABLET ORAL EVERY 6 HOURS PRN
Qty: 100 TABLET | Refills: 0 | Status: SHIPPED | OUTPATIENT
Start: 2022-07-30 | End: 2023-10-06

## 2022-07-30 RX ORDER — FERROUS SULFATE 325(65) MG
325 TABLET ORAL
Qty: 100 TABLET | Refills: 0 | Status: SHIPPED | OUTPATIENT
Start: 2022-07-30 | End: 2023-10-06

## 2022-07-30 RX ORDER — IBUPROFEN 600 MG/1
600 TABLET, FILM COATED ORAL EVERY 6 HOURS PRN
Qty: 60 TABLET | Refills: 0 | Status: SHIPPED | OUTPATIENT
Start: 2022-07-30 | End: 2023-10-06

## 2022-07-30 RX ORDER — SODIUM CHLORIDE 9 MG/ML
INJECTION, SOLUTION INTRAVENOUS
Status: DISPENSED
Start: 2022-07-30 | End: 2022-07-30

## 2022-07-30 RX ORDER — AMOXICILLIN 250 MG
1 CAPSULE ORAL DAILY
Qty: 100 TABLET | Refills: 0 | Status: SHIPPED | OUTPATIENT
Start: 2022-07-30 | End: 2023-10-06

## 2022-07-30 RX ADMIN — IBUPROFEN 800 MG: 800 TABLET, FILM COATED ORAL at 19:37

## 2022-07-30 RX ADMIN — SENNOSIDES AND DOCUSATE SODIUM 2 TABLET: 50; 8.6 TABLET ORAL at 10:03

## 2022-07-30 RX ADMIN — ACETAMINOPHEN 975 MG: 325 TABLET, FILM COATED ORAL at 02:42

## 2022-07-30 RX ADMIN — SENNOSIDES AND DOCUSATE SODIUM 2 TABLET: 50; 8.6 TABLET ORAL at 19:37

## 2022-07-30 RX ADMIN — ACETAMINOPHEN 975 MG: 325 TABLET, FILM COATED ORAL at 10:03

## 2022-07-30 RX ADMIN — SIMETHICONE 80 MG: 80 TABLET, CHEWABLE ORAL at 15:04

## 2022-07-30 RX ADMIN — ACETAMINOPHEN 975 MG: 325 TABLET, FILM COATED ORAL at 19:37

## 2022-07-30 ASSESSMENT — ACTIVITIES OF DAILY LIVING (ADL)
ADLS_ACUITY_SCORE: 20

## 2022-07-30 NOTE — PROGRESS NOTES
Post Partum Progress Note  PPD#1    Subjective:  She is resting comfortably in bed this morning. She complains of dizziness while lying in bed and feeling very tired.. Pain is improving and well controlled on current medication regimen. She is tolerating PO intake. Lochia present and similar to light menses.  She is voiding without difficulty. She has passed a little flatus and has not yet had a BM. She is ambulating without dizziness or difficulty.  She denies headache, changes in vision, nausea/vomiting, chest pain, shortness of breath, RUQ pain, or worsening edema.  Plans to breastfeed.    Objective:  Vitals:    22 2100 22 2200 22 2314 22 0010   BP: 125/83 119/69 122/74 120/68   BP Location: Right arm Right arm Right arm Right arm   Patient Position: Semi-Talamantes's Semi-Talamantes's Semi-Talamantes's Semi-Talamantes's   Cuff Size: Adult Large Adult Large Adult Large Adult Large   Pulse: 86 90  86   Resp: 18 18 18 18   Temp:    99.6  F (37.6  C)   TempSrc:    Oral   SpO2: 100% 96% 96%        General: NAD. A&Ox3.  CV: Regular rate, well perfused.   Pulm: Normal respiratory effort.  Abd: Soft, non-tender, non-distended. Fundus is firm below the umbilicus.    Incision:  incision is clean, dry, intact  Ext: trace lower extremity edema bilaterally. No calf tenderness.    Assessment/Plan:  Melissa Albert is a 37 year old  female who is POD#1 s/p PLTCS for hx myomectomy. Doing well in early postop.    Routine PP  - Encourage routine post-operative goals including ambulation and incentive spirometry  - PNC: Rh pos. Rubella immune. No intervention indicated.  - Pain: controlled on oral medications  - Heme: Hgb 10.1 > QBL 1615 (TXA, miso, hemabate)> 7.6 > 7.1>7.1>symptomatic so will plan to transfuse 1u PRBC this morning.  Ordered.  - GI: continue anti-emetics and stool softeners as needed.  - :. Voiding spontaneously .  - Infant: Stable in NICU  - Feeding: Plans on breastfeeding.  - BC: Plans on nothing.  Reviewed pregnancy spacing.    T2DM  - PTA lantus 14U qHS, novolog 1:6 CHO [held]  - BG QID, [facility] will plan to discontinue insulin, followup in 2 weeks and will decide on next steps from there.  Typically has CGM, has very close followup with her Endocrinologist, has established plan and as such will continue with this previously made plan.    cHTN  - HELLP labs pending this AM    Discharge to home on POD#2-3    Lisa Johnson MD  ObGyn Resident, PGY-3  2:23 AM 07/30/2022     Staff MD Note    I appreciate the note by Dr. Johnson.  Any necessary changes have been made by me.  I saw and evaluated the patient and agree with the findings and plan of care as documented in the note.    Sarah Ibarra MD

## 2022-07-30 NOTE — PLAN OF CARE
Goal Outcome Evaluation:  Patient arrived to [facility] unit via cart at , with belongings, accompanied by spouse/ significant other, visited  in NICU before transferred to NICU. Received report from DAMIEN Carr. Unit and room orientation started. Call light given and within arms reach; no concerns present at this time. Continue with plan of care.

## 2022-07-30 NOTE — PROVIDER NOTIFICATION
Patient would like renae removed. please place order. THank you so much   07/30/22 1400   Provider Notification   Provider Name/Title g3 lise   Method of Notification Electronic Page   Request Evaluate-Remote   Notification Reason Other

## 2022-07-30 NOTE — PLAN OF CARE
Spoke with OB/GYN provider outside of room as she was entering. Patient was told by her Endo that she would not be doing any insulin correction after delivery until she had her follow up appt with ENDO 2 weeks after delivery.  Patient questioned need for sliding scale.   BGS was 141 - need 1 unit correction. Patient states she had eaten ~ 1/2 cup chex mix and she had started eating breakfast when BGS was done.  Dr Ibarra states that would be fine to not do the 1 unit, patient can decline.  Dr Ibarra gave verbal to enter an ENDO consult.

## 2022-07-30 NOTE — PROVIDER NOTIFICATION
07/29/22 2055   Provider Notification   Provider Name/Title G3   Method of Notification Electronic Page   Request Evaluate-Remote   Notification Reason Vital Signs Change  (SBP at 140's with no symptoms.)

## 2022-07-30 NOTE — PROVIDER NOTIFICATION
Patient receiving transfusion.  1245pm /85  1310 /85     07/30/22 1320   Provider Notification   Provider Name/Title g3 lise   Method of Notification Electronic Page   Request Evaluate-Remote   Notification Reason Vital Signs Change

## 2022-07-30 NOTE — PROVIDER NOTIFICATION
07/30/22 0653   Provider Notification   Provider Name/Title G3 Dr Johnson   Method of Notification Electronic Page   Request Evaluate-Remote   Notification Reason Lab Results  (7.1)   FYI, pts hemoglobin at 05:40 am is 7.1.

## 2022-07-30 NOTE — PLAN OF CARE
Goal Outcome Evaluation:  Data: Melissa Albert transferred to 71 via cart at 1920. Pt stopped at NICU to see her baby. TAP block done in OR. Renae with clear urine, OK to keep renae for few more hours.   Action: Receiving unit notified of transfer: Yes. Patient and family notified of room change. Report given to Jane QUEZADA. Belongings sent to receiving unit. Accompanied by Registered Nurse. Oriented patient to surroundings. Call light within reach.   Response: Patient tolerated transfer and is stable.  at bedside.

## 2022-07-30 NOTE — PLAN OF CARE
VSS, postpartum assessments WNL. Pt has the renae and report feeling dizziness, hemoglobin is 7.1 notified the provider. not passing gas, eating and drinking normally. Incision is open to air, and use inter dry. lochia is light, no s/s infection.  Taking tylenol  for pain and reports good pain management. Pt is agreeable with her plan of care.Will continue with plan of care.   Goal Outcome Evaluation:

## 2022-07-30 NOTE — PROVIDER NOTIFICATION
07/29/22 2237   Provider Notification   Provider Name/Title ZULAY Johnson   Method of Notification Electronic Page   Request Evaluate-Remote   Notification Reason Status Update   pt reports of dizziness at rest. VSS. (/69, HR 90), taking fluids PO. do you recommend bolus or continue to monitor? call RN [phone]. thanks

## 2022-07-30 NOTE — LACTATION NOTE
"This note was copied from a baby's chart.  D:  I met with Melissa on [facility]. Hang is her first baby.  She has a history of type II diabetes, asthma, crohns disease, uterine fibroids with myomectomy; medications include insulin, tylenol/ibuprofen, and pre-kalli vitamins. She has no history of breast/chest surgery or trauma.  She has already started to pump; she was hand expressing prior to delivery with total collection of 12ml at home in freezer currently. We discussed bringing this colostrum in to be used for Hang in these early days; we discussed milk storage guidelines. Hang is being seen by ENT, per chart note has cleft palate. With mom, discussed potential inability to create suction required for breastfeeding if cleft hard palate; discussed benefits still of non-nutritive sucking, skin to skin holding, while obtaining required nutrition through other non-breastfeeding modes. Will continue to discuss with team, awaiting OT assessment.   I:  I gave her a folder of introductory materials and went over pumping guidelines.  I reviewed physiology of colostrum and milk production, pumping guidelines, and I gave her a log and encouraged her to use it.   I explained how to access the videos \"Hand Expression\" and \"Maximizing Milk Production\"; as well as other helpful books and websites.   We discussed hands-on pumping techniques and usefulness of a hands-free pumping bra; which she has.  We discussed skin to skin holding and how to reach your breastfeeding goals.  We talked about medications during breastfeeding.  She verbalized understanding via teachback. Melissa has a new Spectra pump through insurance.   A:  Mom has information she needs to initiate her supply.   P: Will continue to provide lactation support.    DENZEL Young, RN, IBCLC            "

## 2022-07-30 NOTE — PLAN OF CARE
Goal Outcome Evaluation:    Plan of Care Reviewed With: patient, spouse     Overall Patient Progress: improving    VSS. Fundus midline, firm, and U/U. Scant lochia. Incision approximated with liquid glue; no drainage noted. Pain adequately managed with tylenol. C/o dizziness, bolus 500 ml of LR infusing. Renae in place, renae care completed for the shift. Encouraged PO fluids, tolerating well. Pumping independently. Continue with plan of care.

## 2022-07-31 LAB
GLUCOSE BLDC GLUCOMTR-MCNC: 102 MG/DL (ref 70–99)
GLUCOSE BLDC GLUCOMTR-MCNC: 114 MG/DL (ref 70–99)
GLUCOSE BLDC GLUCOMTR-MCNC: 123 MG/DL (ref 70–99)
HGB BLD-MCNC: 7.5 G/DL (ref 11.7–15.7)

## 2022-07-31 PROCEDURE — 36415 COLL VENOUS BLD VENIPUNCTURE: CPT | Performed by: STUDENT IN AN ORGANIZED HEALTH CARE EDUCATION/TRAINING PROGRAM

## 2022-07-31 PROCEDURE — 85018 HEMOGLOBIN: CPT | Performed by: STUDENT IN AN ORGANIZED HEALTH CARE EDUCATION/TRAINING PROGRAM

## 2022-07-31 PROCEDURE — P9016 RBC LEUKOCYTES REDUCED: HCPCS | Performed by: OBSTETRICS & GYNECOLOGY

## 2022-07-31 PROCEDURE — 120N000002 HC R&B MED SURG/OB UMMC

## 2022-07-31 PROCEDURE — 250N000013 HC RX MED GY IP 250 OP 250 PS 637: Performed by: STUDENT IN AN ORGANIZED HEALTH CARE EDUCATION/TRAINING PROGRAM

## 2022-07-31 PROCEDURE — 250N000013 HC RX MED GY IP 250 OP 250 PS 637: Performed by: OBSTETRICS & GYNECOLOGY

## 2022-07-31 RX ORDER — SODIUM CHLORIDE 9 MG/ML
INJECTION, SOLUTION INTRAVENOUS
Status: DISPENSED
Start: 2022-07-31 | End: 2022-08-01

## 2022-07-31 RX ORDER — NIFEDIPINE 30 MG/1
30 TABLET, EXTENDED RELEASE ORAL EVERY 24 HOURS
Status: DISCONTINUED | OUTPATIENT
Start: 2022-07-31 | End: 2022-08-01

## 2022-07-31 RX ADMIN — ACETAMINOPHEN 975 MG: 325 TABLET, FILM COATED ORAL at 07:51

## 2022-07-31 RX ADMIN — ACETAMINOPHEN 975 MG: 325 TABLET, FILM COATED ORAL at 20:01

## 2022-07-31 RX ADMIN — IBUPROFEN 800 MG: 800 TABLET, FILM COATED ORAL at 20:01

## 2022-07-31 RX ADMIN — IBUPROFEN 800 MG: 800 TABLET, FILM COATED ORAL at 02:25

## 2022-07-31 RX ADMIN — IBUPROFEN 800 MG: 800 TABLET, FILM COATED ORAL at 07:52

## 2022-07-31 RX ADMIN — NIFEDIPINE 30 MG: 30 TABLET, FILM COATED, EXTENDED RELEASE ORAL at 20:00

## 2022-07-31 RX ADMIN — SENNOSIDES AND DOCUSATE SODIUM 2 TABLET: 50; 8.6 TABLET ORAL at 07:51

## 2022-07-31 RX ADMIN — ACETAMINOPHEN 975 MG: 325 TABLET, FILM COATED ORAL at 02:25

## 2022-07-31 ASSESSMENT — ACTIVITIES OF DAILY LIVING (ADL)
ADLS_ACUITY_SCORE: 20

## 2022-07-31 NOTE — PROGRESS NOTES
IV placement note:    22 gauge IV placed with one attempt. IV located in left arm.  Blood return noted upon placement and IV catheter advanced without issue. No complaints of discomfort at IV site. No redness or increased temperature noted to IV site. Saline flushed in without infiltration. Curos cap applied. Primary RN notified of placement.

## 2022-07-31 NOTE — PROGRESS NOTES
Post Partum Progress Note    Subjective:  She is resting comfortably in bed this morning.  Her dizziness from yesterday is improved, but she is still feeling fatigued.  Pain is improving and well controlled on current medication regimen. She is tolerating PO intake. Lochia present and similar to light menses.  She is voiding without difficulty. She has passed a little flatus and has not yet had a BM. She is ambulating without dizziness or difficulty.  She denies headache, changes in vision, nausea/vomiting, chest pain, shortness of breath, RUQ pain, or worsening edema.  Plans to breastfeed.    Objective:  Vitals:    22 1245 22 1310 22 1330 22 1933   BP: (!) 143/85 (!) 150/85 136/82 (!) 141/77   BP Location:    Left arm   Patient Position:    Semi-Talamantes's   Cuff Size:    Adult Large   Pulse: 97 104 104    Resp: 18 18 18 20   Temp:   98.4  F (36.9  C) 99.7  F (37.6  C)   TempSrc:   Oral Oral   SpO2:   96%        General: NAD. A&Ox3.  CV: Regular rate, well perfused.   Pulm: Normal respiratory effort.  Abd: Soft, non-tender, non-distended. Fundus is firm below the umbilicus.    Incision:  incision is clean, dry, intact  Ext: trace lower extremity edema bilaterally. No calf tenderness.    Assessment/Plan:  Melissa Albert is a 37 year old  female who is POD#2 s/p PLTCS for hx myomectomy. Doing well in early postop. Plan for discharge tomorrow likely     Routine PP  - Encourage routine post-operative goals including ambulation and incentive spirometry  - PNC: Rh pos. Rubella immune. No intervention indicated.  - Pain: controlled on oral medications  - Heme: Hgb 10.1 > QBL 1615 (TXA, miso, hemabate)> 7.6 > 7.1>7.1>1u PRBC>>7.8. repeat this AM. Will discharge with PO iron  - GI: continue anti-emetics and stool softeners as needed.  - :. Voiding spontaneously .  - Infant: Stable in NICU  - Feeding: Plans on breastfeeding.  - BC: declines. Previously discussed pregnancy spacing.    T2DM  - PTA  lantus 14U qHS, novolog 1:6 CHO [held]  - BG QID, per patient Endocrinologist at [facility] will plan to discontinue insulin, followup in 2 weeks and will decide on next steps from there.  Typically has CGM, has very close followup with her Endocrinologist, has established plan and as such will continue with this previously made plan.    cHTN  - HELLP labs normal  - BP largely normal, occasional low mild range    Discharge to home on POD#2-3    Desean Bonds MD  OB/GYN PGY-3  07/30/2022 10:43 PM     I have seen and examined the patient without the resident. I have reviewed, edited, and agree with the note.   My findings are: appears well  Abdomen soft NT ND  Incision CDI   LE without edema, erythema   Hemoglobin   Date Value Ref Range Status   07/31/2022 7.5 (L) 11.7 - 15.7 g/dL Final   07/30/2022 7.8 (L) 11.7 - 15.7 g/dL Final   07/08/2018 8.1 (L) 11.7 - 15.7 g/dL Final   07/08/2018 7.5 (L) 11.7 - 15.7 g/dL Final     Given repeat hgb 7.5 and patient is symptomatic she desires another transfusion PRBC.     Gabi Rausch MD

## 2022-07-31 NOTE — PLAN OF CARE
Patient VSS and WNL. Patient states pain is managable with tylenol/Ibuprofen at this time. Patient's Hgb this morning is 7.5. Plan toTBD.   Patient went to the NICU at 1015am and plans to return about 1145am

## 2022-07-31 NOTE — PROVIDER NOTIFICATION
07/30/22 1956   Provider Notification   Provider Name/Title G3   Method of Notification Electronic Page   Request Evaluate-Remote   Notification Reason Lab Results  (Hemoglobin post transtfusion is 7.8)

## 2022-07-31 NOTE — PLAN OF CARE
Data: Vital signs within normal limits. Postpartum checks within normal limits - see flow record. Patient eating and drinking normally. Patient able to empty bladder independently and is up ambulating. No apparent signs of infection. Incision healing well. Patient performing self cares. Patient had abdominal binder placed. Patient is double pumping breastmilk for infant. Patient has been encouraged to do more visits with a shorter timespan with infant. Patient received a blood transfusion of 1unit PRBC. Patient tolerated well.  Action: Patient medicated during the shift for pain. See MAR. Patient reassessed within 1 hour after each medication and pain was improved - patient stated she was comfortable. Patient education done about self care. See flow record.  Response: Positive attachment behaviors observed with infant. Support persons was present and attentive to patient.   Plan: Anticipate discharge.

## 2022-07-31 NOTE — PLAN OF CARE
Patient VSS and WNL. Patient is breastfeeding on demand with some assistance.  Patient is eating and drinking and due to void. Continue plan of care. Patient received a blood transfusion today. Patient stated no dizziness. Patient went to the NICU for 4 hours this afternoon/evening. Patient came back to unit per RN request. Patient is resting and making a plan for dinner. Continue plan of care.

## 2022-07-31 NOTE — PLAN OF CARE
VSS on RA, SBP at 140's- MD aware. Denies dizziness. Up ad rosa. Fundus firm at 1 cm below umbilicus. Lochia scant. Voids spontaneously. Passing flatus, denies nausea. Incisional pain managed with Tylenol and Ibuprofen. Incision, SARAH, c/d/i. Breast pumping done independently. Infant is in NICU. Continue plan of care.     Vitals:    07/30/22 1310 07/30/22 1330 07/30/22 1933 07/31/22 0449   BP: (!) 150/85 136/82 (!) 141/77 (!) 141/73   BP Location:   Left arm Right arm   Patient Position:   Semi-Talamantes's Supine   Cuff Size:   Adult Large Adult Large   Pulse: 104 104  98   Resp: 18 18 20 18   Temp:  98.4  F (36.9  C) 99.7  F (37.6  C) 98.7  F (37.1  C)   TempSrc:  Oral Oral Oral   SpO2:  96%

## 2022-08-01 LAB
GLUCOSE BLDC GLUCOMTR-MCNC: 110 MG/DL (ref 70–99)
GLUCOSE BLDC GLUCOMTR-MCNC: 90 MG/DL (ref 70–99)
HGB BLD-MCNC: 8.7 G/DL (ref 11.7–15.7)

## 2022-08-01 PROCEDURE — 250N000013 HC RX MED GY IP 250 OP 250 PS 637: Performed by: OBSTETRICS & GYNECOLOGY

## 2022-08-01 PROCEDURE — 120N000002 HC R&B MED SURG/OB UMMC

## 2022-08-01 PROCEDURE — 85018 HEMOGLOBIN: CPT | Performed by: OBSTETRICS & GYNECOLOGY

## 2022-08-01 PROCEDURE — 250N000013 HC RX MED GY IP 250 OP 250 PS 637

## 2022-08-01 PROCEDURE — 36415 COLL VENOUS BLD VENIPUNCTURE: CPT | Performed by: OBSTETRICS & GYNECOLOGY

## 2022-08-01 PROCEDURE — 250N000011 HC RX IP 250 OP 636: Performed by: OBSTETRICS & GYNECOLOGY

## 2022-08-01 PROCEDURE — 250N000011 HC RX IP 250 OP 636

## 2022-08-01 PROCEDURE — 258N000003 HC RX IP 258 OP 636: Performed by: OBSTETRICS & GYNECOLOGY

## 2022-08-01 RX ORDER — DIPHENHYDRAMINE HYDROCHLORIDE 50 MG/ML
50 INJECTION INTRAMUSCULAR; INTRAVENOUS
Status: DISCONTINUED | OUTPATIENT
Start: 2022-08-01 | End: 2022-08-04 | Stop reason: HOSPADM

## 2022-08-01 RX ORDER — NIFEDIPINE 30 MG
60 TABLET, EXTENDED RELEASE ORAL EVERY 24 HOURS
Qty: 60 TABLET | Refills: 1 | Status: SHIPPED | OUTPATIENT
Start: 2022-08-01 | End: 2022-08-02

## 2022-08-01 RX ORDER — NIFEDIPINE 30 MG/1
60 TABLET, EXTENDED RELEASE ORAL EVERY 24 HOURS
Status: DISCONTINUED | OUTPATIENT
Start: 2022-08-01 | End: 2022-08-02

## 2022-08-01 RX ORDER — OXYCODONE HYDROCHLORIDE 5 MG/1
5 TABLET ORAL EVERY 6 HOURS PRN
Qty: 6 TABLET | Refills: 0 | Status: SHIPPED | OUTPATIENT
Start: 2022-08-01 | End: 2022-08-04

## 2022-08-01 RX ORDER — NIFEDIPINE 30 MG
30 TABLET, EXTENDED RELEASE ORAL EVERY 24 HOURS
Qty: 30 TABLET | Refills: 1 | Status: SHIPPED | OUTPATIENT
Start: 2022-08-01 | End: 2022-08-01

## 2022-08-01 RX ORDER — HYDROCHLOROTHIAZIDE 25 MG/1
25 TABLET ORAL DAILY
Status: DISCONTINUED | OUTPATIENT
Start: 2022-08-01 | End: 2022-08-04 | Stop reason: HOSPADM

## 2022-08-01 RX ORDER — METHYLPREDNISOLONE SODIUM SUCCINATE 125 MG/2ML
125 INJECTION, POWDER, LYOPHILIZED, FOR SOLUTION INTRAMUSCULAR; INTRAVENOUS
Status: DISCONTINUED | OUTPATIENT
Start: 2022-08-01 | End: 2022-08-04 | Stop reason: HOSPADM

## 2022-08-01 RX ORDER — ENOXAPARIN SODIUM 100 MG/ML
40 INJECTION SUBCUTANEOUS EVERY 12 HOURS
Status: DISCONTINUED | OUTPATIENT
Start: 2022-08-01 | End: 2022-08-04 | Stop reason: HOSPADM

## 2022-08-01 RX ORDER — HYDROCHLOROTHIAZIDE 25 MG/1
25 TABLET ORAL DAILY
Qty: 10 TABLET | Refills: 0 | Status: SHIPPED | OUTPATIENT
Start: 2022-08-01 | End: 2023-08-23

## 2022-08-01 RX ADMIN — IBUPROFEN 800 MG: 800 TABLET, FILM COATED ORAL at 13:56

## 2022-08-01 RX ADMIN — ACETAMINOPHEN 975 MG: 325 TABLET, FILM COATED ORAL at 08:06

## 2022-08-01 RX ADMIN — HYDROCHLOROTHIAZIDE 25 MG: 25 TABLET ORAL at 13:07

## 2022-08-01 RX ADMIN — IRON SUCROSE 300 MG: 20 INJECTION, SOLUTION INTRAVENOUS at 13:08

## 2022-08-01 RX ADMIN — IBUPROFEN 800 MG: 800 TABLET, FILM COATED ORAL at 08:06

## 2022-08-01 RX ADMIN — ACETAMINOPHEN 975 MG: 325 TABLET, FILM COATED ORAL at 13:56

## 2022-08-01 RX ADMIN — NIFEDIPINE 60 MG: 30 TABLET, FILM COATED, EXTENDED RELEASE ORAL at 20:26

## 2022-08-01 RX ADMIN — ENOXAPARIN SODIUM 40 MG: 40 INJECTION SUBCUTANEOUS at 16:22

## 2022-08-01 RX ADMIN — IBUPROFEN 800 MG: 800 TABLET, FILM COATED ORAL at 20:26

## 2022-08-01 RX ADMIN — IBUPROFEN 800 MG: 800 TABLET, FILM COATED ORAL at 01:59

## 2022-08-01 RX ADMIN — ACETAMINOPHEN 975 MG: 325 TABLET, FILM COATED ORAL at 01:59

## 2022-08-01 RX ADMIN — ACETAMINOPHEN 975 MG: 325 TABLET, FILM COATED ORAL at 20:26

## 2022-08-01 ASSESSMENT — ACTIVITIES OF DAILY LIVING (ADL)
ADLS_ACUITY_SCORE: 20

## 2022-08-01 NOTE — PLAN OF CARE
VSS on RA, SBP at 140-150's, started on Procardia 30 mg, scheduled as daily. Up ad rosa. Fundus firm at 1 cm below umbilicus. Lochia scant. Voids spontaneously. Passing flatus, denies nausea. Incisional pain managed with Tylenol and Ibuprofen. Incision is SARAH, c/d/i. Breast pumping done independently. Infant is in NICU. Support person is . Continue plan of care.     Vitals:    07/31/22 1845 07/31/22 1957 07/31/22 2022 08/01/22 0632   BP: (!) 151/89 (!) 157/82 (!) 143/84 128/81   BP Location:  Right arm  Right arm   Patient Position:  Semi-Talamantes's  Fowlers   Cuff Size:  Adult Large  Adult Large   Pulse:  108 108 110   Resp:  20  18   Temp:  98.4  F (36.9  C)  98.3  F (36.8  C)   TempSrc:  Oral  Oral   SpO2:       Awaiting HGB result.

## 2022-08-01 NOTE — CONSULTS
[facility]'S \[facility] [place] [facility]\""  MATERNAL CHILD HEALTH   INITIAL NICU PSYCHOSOCIAL ASSESSMENT      DATA:      Presenting Information: Hang is a 37w1d, male infant born by scheduled .  He was admitted to the NICU for further evaluation, monitoring and treatment of respiratory failure requiring CPAP. Sw met with family for NICU consult.      Living Situation: Parents, Melissa and Zeb, are  and live together in [place], along with their dogs. Hang is their first child.      Social Support: Parents share they are well supported by family.     Education/Employment: Both parents are employed full time. 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 Father reports he is in tech support at  and will have 5 weeks of paid leave.      Insurance: [employer] and [employer]. Parents are still deciding which insurance plan they will be adding Hang to but are aware of the process and timeline for doing so.      Source of Financial Support: parental employment      Follow up care: [facility] [place].      Transportation: No concerns. Parents are aware they are able to buy a discounted parking pass.      Baby supplies: Family has all the necessary baby supplies at this time. Father did share concerns about Hang fitting in his car seat. Sw shared about the car seat trial prior to discharge.      Mental Health History: Mother shares a history of depression that has been managed through medication. She reports her mental health is in a good place at this time and denies current struggles.     Diagnoses: Depression.     Medications: Previous use of medication.     Therapy: Not at this time but open to resources should she notice changes in mood or coping.      History of Postpartum Mood Disorders: This is mothers first child.      Chemical Health History: None reported or noted in  "chart review.      INTERVENTION:        Chart review    Conducted Psychosocial Assessment    Introduction to Maternal Child Health SW role and scope of practice    Orientation to the NICU (parking, lodging, meals, visitation)    Validated emotions and provided supportive listening    SW described process for birth certificate, social security card and insurance process.     Provided resources and referrals  ? info to purchase discounted parking pass    Provided psychoeducation on  mood disorders and indicated that SW would continue to monitor mood and support bridging to mental health resources as needed.    Provided SW contact info     ASSESSMENT:      Coping: Sw met with mother and father on the  family care center. Parents presented as kind, engaging, and willing to share about their emotional experiences. Parents have had time to acclimate to the NICU and did not have further questions or concerns about the space. They share hopes of having a care conference so that they may have a better understanding of [facility] medical course. Parents share it has \"been a week\" as this was an unexpected NICU admission. Outwardly, they appear to be coping appropriately and share relief about Barrera level of acuity compared to other babies in the NICU. Mother was tearful as she described events that occurred over the weekend with another baby in the bay and shared empathy for these families. At present, mother does not have concerns about her mental health and appears to be a strong advocate for herself.      Assessment of parental risk for PMAD: Higher than average risk, considering medically complexity and mental health history.      Risk Factors: first time parents, unexpected hospitalization  and maternal mental health history or concerns      Resiliency Factors & Strengths: local family, strong social support, parental employment, stable housing, reliable transportation, connected with mental health " "support, able and willing to ask for help/accept help, able and willing to ask advocate for self/baby, willingness to have vulnerable conversations about emotions, demonstrated commitment to being present and engaged in baby's cares and demonstrated ability to integrate new information      PLAN:      Parents are requesting a care conference with ENT as they are hoping to obtain a \"clear plan\" of care. Sw has reached out to the Otolaryngology/ENT team to coordinate a meeting but has not heard     SW will continue to follow for supportive intervention.     INOCENCIO Roach  Maternal Child Health   Phone: [phone]  Pager: [phone]  After hours pager: [phone]     "

## 2022-08-01 NOTE — PLAN OF CARE
Goal Outcome Evaluation: BPs elevated and tachycardic. Denies pre-e symptoms. Postpartum checks WDL. Incision SARAH with liquid bandage, interdry placed. Showered. Up independently, voiding without difficulty. Pain managed with tylenol and ibuprofen. Abdominal binder in place. Pumping for baby in the NICU.

## 2022-08-01 NOTE — PROGRESS NOTES
Postpartum Progress Note    Subjective:  She is resting comfortably in bed this morning. She does currently have a headache and requests pain meds. Pain is improving and well controlled on current medication regimen. She is tolerating PO intake. Lochia present and similar to light menses.  She is voiding without difficulty. She has passed flatus and had a loose stool yesterday, so backed off on the stool softeners. She is ambulating without dizziness or difficulty.  She denies lightheadedness/dizziness, changes in vision, nausea/vomiting, chest pain, shortness of breath, RUQ pain, or worsening edema.  Plans to breastfeed, declines contraception. Okay with IV iron if needed.    Objective:  Vitals:    22 1845 22 0632   BP: (!) 151/89 (!) 157/82 (!) 143/84 128/81   BP Location:  Right arm  Right arm   Patient Position:  Semi-Talamantes's  Fowlers   Cuff Size:  Adult Large  Adult Large   Pulse:  108 108 110   Resp:  20  18   Temp:  98.4  F (36.9  C)  98.3  F (36.8  C)   TempSrc:  Oral  Oral   SpO2:           General: NAD. A&Ox3.  CV: Regular rate, well perfused.   Pulm: Normal respiratory effort.  Abd: Soft, non-tender, non-distended. Fundus is firm below the umbilicus.    Incision:  incision is clean, dry, intact  Ext: trace lower extremity edema bilaterally. No calf tenderness.    Assessment/Plan:  Melissa Albert is a 37 year old  female who is POD#3 s/p PLTCS for hx myomectomy. Doing well in early postop. Plan for discharge tomorrow likely     Routine PP  - Encourage routine post-operative goals including ambulation and incentive spirometry  - PNC: Rh pos. Rubella immune. No intervention indicated.  - Pain: controlled on oral medications  - Heme: Hgb 10.1 > QBL 1615 (TXA, miso, hemabate)> 7.6 > 7.1> 1U pRBCs > 7.6 > 7.8 > 7.5 > 1u pRBC. Repeat Hgb this AM. Consider IV iron while inpatient. Will discharge with PO iron  - GI: continue anti-emetics and stool softeners as needed.  -  :. Voiding spontaneously .  - Infant: Stable in NICU  - Feeding: Plans on breastfeeding.  - BC: declines. Previously discussed pregnancy spacing.  - Ppx: Lovenox when Hgb stable    T2DM  - PTA lantus 14U qHS, novolog 1:6 CHO [held]  - BG QID, per patient Endocrinologist at [facility] will plan to discontinue insulin, followup in 2 weeks and will decide on next steps from there.  Typically has CGM, has very close followup with her Endocrinologist, has established plan and as such will continue with this previously made plan.    cHTN  - HELLP labs normal  - Normal to low mild range blood pressures now  - D#2 Nifed 30 (PM dosing)    Anticipate discharge to home on POD#4 pending BP control  Follow-up in 1 week for BP check and 6 weeks for routine postpartum visit.    Sarah Gurrola MD  OB/GYN, PGY-3  08/01/2022, 6:41 AM     BP (!) 152/92   Pulse 103   Temp 98.5  F (36.9  C) (Oral)   Resp 18   SpO2 96%   Breastfeeding Unknown       Hemoglobin   Date Value Ref Range Status   08/01/2022 8.7 (L) 11.7 - 15.7 g/dL Final     The patient was seen and examined by me separately from the team.  I have reviewed and agree with the above note.  She is feeling well today, pain ins controlled, baby is stable in the NICU.  She is agreeable to a dose of IV iron today. Will add hydrochlorothiazide 25 mg now, may need to increase pm dose of nifedipine depending on BP control today.  Likely will need to hold discharge until tomorrow.      Mary Adrian MD, FACOG

## 2022-08-02 LAB
ALT SERPL W P-5'-P-CCNC: 11 U/L (ref 0–50)
AST SERPL W P-5'-P-CCNC: 6 U/L (ref 0–45)
CREAT SERPL-MCNC: 0.38 MG/DL (ref 0.52–1.04)
GFR SERPL CREATININE-BSD FRML MDRD: >90 ML/MIN/1.73M2
GLUCOSE BLDC GLUCOMTR-MCNC: 101 MG/DL (ref 70–99)
GLUCOSE BLDC GLUCOMTR-MCNC: 127 MG/DL (ref 70–99)
GLUCOSE BLDC GLUCOMTR-MCNC: 132 MG/DL (ref 70–99)
HGB BLD-MCNC: 9 G/DL (ref 11.7–15.7)
PLATELET # BLD AUTO: 346 10E3/UL (ref 150–450)

## 2022-08-02 PROCEDURE — 84460 ALANINE AMINO (ALT) (SGPT): CPT | Performed by: STUDENT IN AN ORGANIZED HEALTH CARE EDUCATION/TRAINING PROGRAM

## 2022-08-02 PROCEDURE — 84450 TRANSFERASE (AST) (SGOT): CPT | Performed by: STUDENT IN AN ORGANIZED HEALTH CARE EDUCATION/TRAINING PROGRAM

## 2022-08-02 PROCEDURE — 120N000002 HC R&B MED SURG/OB UMMC

## 2022-08-02 PROCEDURE — 85018 HEMOGLOBIN: CPT | Performed by: STUDENT IN AN ORGANIZED HEALTH CARE EDUCATION/TRAINING PROGRAM

## 2022-08-02 PROCEDURE — 82565 ASSAY OF CREATININE: CPT | Performed by: STUDENT IN AN ORGANIZED HEALTH CARE EDUCATION/TRAINING PROGRAM

## 2022-08-02 PROCEDURE — 250N000011 HC RX IP 250 OP 636

## 2022-08-02 PROCEDURE — 250N000013 HC RX MED GY IP 250 OP 250 PS 637: Performed by: STUDENT IN AN ORGANIZED HEALTH CARE EDUCATION/TRAINING PROGRAM

## 2022-08-02 PROCEDURE — 250N000013 HC RX MED GY IP 250 OP 250 PS 637: Performed by: OBSTETRICS & GYNECOLOGY

## 2022-08-02 PROCEDURE — 258N000003 HC RX IP 258 OP 636: Performed by: STUDENT IN AN ORGANIZED HEALTH CARE EDUCATION/TRAINING PROGRAM

## 2022-08-02 PROCEDURE — 250N000011 HC RX IP 250 OP 636: Performed by: STUDENT IN AN ORGANIZED HEALTH CARE EDUCATION/TRAINING PROGRAM

## 2022-08-02 PROCEDURE — 36415 COLL VENOUS BLD VENIPUNCTURE: CPT | Performed by: STUDENT IN AN ORGANIZED HEALTH CARE EDUCATION/TRAINING PROGRAM

## 2022-08-02 PROCEDURE — 85049 AUTOMATED PLATELET COUNT: CPT | Performed by: STUDENT IN AN ORGANIZED HEALTH CARE EDUCATION/TRAINING PROGRAM

## 2022-08-02 RX ORDER — MAGNESIUM SULFATE HEPTAHYDRATE 500 MG/ML
10 INJECTION, SOLUTION INTRAMUSCULAR; INTRAVENOUS
Status: DISCONTINUED | OUTPATIENT
Start: 2022-08-02 | End: 2022-08-04 | Stop reason: HOSPADM

## 2022-08-02 RX ORDER — LABETALOL HYDROCHLORIDE 5 MG/ML
20-80 INJECTION, SOLUTION INTRAVENOUS EVERY 10 MIN PRN
Status: DISCONTINUED | OUTPATIENT
Start: 2022-08-02 | End: 2022-08-04 | Stop reason: HOSPADM

## 2022-08-02 RX ORDER — NIFEDIPINE 30 MG/1
90 TABLET, EXTENDED RELEASE ORAL EVERY 24 HOURS
Status: DISCONTINUED | OUTPATIENT
Start: 2022-08-02 | End: 2022-08-02

## 2022-08-02 RX ORDER — MAGNESIUM SULFATE HEPTAHYDRATE 40 MG/ML
4 INJECTION, SOLUTION INTRAVENOUS ONCE
Status: COMPLETED | OUTPATIENT
Start: 2022-08-02 | End: 2022-08-02

## 2022-08-02 RX ORDER — HYDRALAZINE HYDROCHLORIDE 20 MG/ML
10 INJECTION INTRAMUSCULAR; INTRAVENOUS
Status: DISCONTINUED | OUTPATIENT
Start: 2022-08-02 | End: 2022-08-04 | Stop reason: HOSPADM

## 2022-08-02 RX ORDER — LORAZEPAM 2 MG/ML
2 INJECTION INTRAMUSCULAR
Status: DISCONTINUED | OUTPATIENT
Start: 2022-08-02 | End: 2022-08-04 | Stop reason: HOSPADM

## 2022-08-02 RX ORDER — MAGNESIUM SULFATE IN WATER 40 MG/ML
2 INJECTION, SOLUTION INTRAVENOUS CONTINUOUS
Status: DISPENSED | OUTPATIENT
Start: 2022-08-02 | End: 2022-08-03

## 2022-08-02 RX ORDER — MAGNESIUM SULFATE HEPTAHYDRATE 40 MG/ML
2 INJECTION, SOLUTION INTRAVENOUS
Status: DISCONTINUED | OUTPATIENT
Start: 2022-08-02 | End: 2022-08-04 | Stop reason: HOSPADM

## 2022-08-02 RX ORDER — MAGNESIUM SULFATE HEPTAHYDRATE 40 MG/ML
4 INJECTION, SOLUTION INTRAVENOUS
Status: DISCONTINUED | OUTPATIENT
Start: 2022-08-02 | End: 2022-08-04 | Stop reason: HOSPADM

## 2022-08-02 RX ORDER — NIFEDIPINE 30 MG/1
30 TABLET, EXTENDED RELEASE ORAL ONCE
Status: COMPLETED | OUTPATIENT
Start: 2022-08-02 | End: 2022-08-02

## 2022-08-02 RX ORDER — NIFEDIPINE 90 MG/1
90 TABLET, FILM COATED, EXTENDED RELEASE ORAL EVERY 24 HOURS
Qty: 40 TABLET | Refills: 0 | Status: SHIPPED | OUTPATIENT
Start: 2022-08-02 | End: 2023-10-06

## 2022-08-02 RX ORDER — NIFEDIPINE 30 MG/1
60 TABLET, EXTENDED RELEASE ORAL 2 TIMES DAILY
Status: DISCONTINUED | OUTPATIENT
Start: 2022-08-02 | End: 2022-08-04 | Stop reason: HOSPADM

## 2022-08-02 RX ORDER — CALCIUM GLUCONATE 94 MG/ML
1 INJECTION, SOLUTION INTRAVENOUS
Status: DISCONTINUED | OUTPATIENT
Start: 2022-08-02 | End: 2022-08-04 | Stop reason: HOSPADM

## 2022-08-02 RX ORDER — SODIUM CHLORIDE, SODIUM LACTATE, POTASSIUM CHLORIDE, CALCIUM CHLORIDE 600; 310; 30; 20 MG/100ML; MG/100ML; MG/100ML; MG/100ML
10-125 INJECTION, SOLUTION INTRAVENOUS CONTINUOUS
Status: DISCONTINUED | OUTPATIENT
Start: 2022-08-02 | End: 2022-08-04 | Stop reason: HOSPADM

## 2022-08-02 RX ADMIN — IBUPROFEN 800 MG: 800 TABLET, FILM COATED ORAL at 02:19

## 2022-08-02 RX ADMIN — SENNOSIDES AND DOCUSATE SODIUM 1 TABLET: 50; 8.6 TABLET ORAL at 07:47

## 2022-08-02 RX ADMIN — IBUPROFEN 800 MG: 800 TABLET, FILM COATED ORAL at 07:47

## 2022-08-02 RX ADMIN — HYDROCHLOROTHIAZIDE 25 MG: 25 TABLET ORAL at 07:47

## 2022-08-02 RX ADMIN — OXYCODONE HYDROCHLORIDE 5 MG: 5 TABLET ORAL at 16:16

## 2022-08-02 RX ADMIN — SODIUM CHLORIDE, POTASSIUM CHLORIDE, SODIUM LACTATE AND CALCIUM CHLORIDE 75 ML/HR: 600; 310; 30; 20 INJECTION, SOLUTION INTRAVENOUS at 20:01

## 2022-08-02 RX ADMIN — MAGNESIUM SULFATE HEPTAHYDRATE 4 G: 40 INJECTION, SOLUTION INTRAVENOUS at 07:42

## 2022-08-02 RX ADMIN — IBUPROFEN 800 MG: 800 TABLET, FILM COATED ORAL at 19:56

## 2022-08-02 RX ADMIN — SODIUM CHLORIDE, POTASSIUM CHLORIDE, SODIUM LACTATE AND CALCIUM CHLORIDE 75 ML/HR: 600; 310; 30; 20 INJECTION, SOLUTION INTRAVENOUS at 07:40

## 2022-08-02 RX ADMIN — MAGNESIUM SULFATE HEPTAHYDRATE 2 G/HR: 40 INJECTION, SOLUTION INTRAVENOUS at 08:14

## 2022-08-02 RX ADMIN — ACETAMINOPHEN 975 MG: 325 TABLET, FILM COATED ORAL at 02:18

## 2022-08-02 RX ADMIN — NIFEDIPINE 60 MG: 30 TABLET, FILM COATED, EXTENDED RELEASE ORAL at 06:55

## 2022-08-02 RX ADMIN — NIFEDIPINE 60 MG: 30 TABLET, FILM COATED, EXTENDED RELEASE ORAL at 19:57

## 2022-08-02 RX ADMIN — ACETAMINOPHEN 975 MG: 325 TABLET, FILM COATED ORAL at 07:47

## 2022-08-02 RX ADMIN — SENNOSIDES AND DOCUSATE SODIUM 2 TABLET: 50; 8.6 TABLET ORAL at 19:57

## 2022-08-02 RX ADMIN — OXYCODONE HYDROCHLORIDE 5 MG: 5 TABLET ORAL at 20:06

## 2022-08-02 RX ADMIN — ENOXAPARIN SODIUM 40 MG: 40 INJECTION SUBCUTANEOUS at 14:06

## 2022-08-02 RX ADMIN — LABETALOL HYDROCHLORIDE 20 MG: 5 INJECTION, SOLUTION INTRAVENOUS at 07:08

## 2022-08-02 RX ADMIN — ACETAMINOPHEN 975 MG: 325 TABLET, FILM COATED ORAL at 19:56

## 2022-08-02 RX ADMIN — IBUPROFEN 800 MG: 800 TABLET, FILM COATED ORAL at 14:05

## 2022-08-02 RX ADMIN — ENOXAPARIN SODIUM 40 MG: 40 INJECTION SUBCUTANEOUS at 02:18

## 2022-08-02 RX ADMIN — OXYCODONE HYDROCHLORIDE 5 MG: 5 TABLET ORAL at 12:25

## 2022-08-02 RX ADMIN — NIFEDIPINE 30 MG: 30 TABLET, FILM COATED, EXTENDED RELEASE ORAL at 01:05

## 2022-08-02 RX ADMIN — ACETAMINOPHEN 975 MG: 325 TABLET, FILM COATED ORAL at 14:05

## 2022-08-02 ASSESSMENT — ACTIVITIES OF DAILY LIVING (ADL)
ADLS_ACUITY_SCORE: 20

## 2022-08-02 NOTE — CONSULTS
Diabetes CNS consult received for Melissa Albert, a 37 year old  female, who is POD#4 s/p PLTCS for hx myomectomy. Pregnancy c/b T2DM diagnosed in , obesity, CHTN, and asthma.    Prior to admission, Melissa was on Lantus insulin 14 units daily at HS and Novolog 1 unit for every 6 grams of CHO, monitoring glucoses 4 times a day and had a Dexcom G6 CGM during this pregnancy.  Had a Emile CGM in the past.  She has an endocrinologist at [facility] in [place], Dr. Herrera Millan MD.  They had developed a plan to discontinue insulin postpartum and patient to continue monitoring glucose via fingersticks.  To f/u with Dr. Millan in 2 weeks.    Hemoglobin A1C 22 was 6.3%.   Latest Reference Range & Units 22 22:16 22 11:12 22 18:37   GLUCOSE BY METER POCT 70 - 99 mg/dL 123 (H) 110 (H) 90     Recommendations:    1.  Continue to monitor glucose; before meals and HS  2.  Healthy eating  3.  F/U with Dr. Herrera Millan MD, Endocrinologist, [facility] as planned    JAKE Santana  Diabetes Clinical Nurse Specialist/[facility]  [phone]

## 2022-08-02 NOTE — PROGRESS NOTES
"  Anesthesia Postpartum  Section with Spinal Anesthesia    Patient: Melissa Albert    Patient location: Postpartum floor    Chief complaint: Acute postoperative pain management s/p spinal anesthetic.    Procedure(s) Performed:  Procedure(s):   SECTION    Anesthesia type: Spinal Block    Subjective  Resting comfortably at this time. Pain adequately controlled by PO medications. Denies pruritis, weakness, paresthesias, difficulties breathing or voiding, headache, nausea, or vomiting. She is able to ambulate and tolerates a regular diet.     Objective  Respiratory Function (RR / SpO2 / Airway Patency): Satisfactory  Cardiac Function (HR / Rhythm / BP): Satisfactory  Strength and sensation lower extremities: Normal  Site of spinal/epidural insertion: No signs of infection or inflammation    Most recent vitals  BP (!) 147/90   Pulse 109   Temp 36.3  C (97.4  F) (Oral)   Resp 18   Ht 1.6 m (5' 3\")   Wt 116.8 kg (257 lb 9.6 oz)   SpO2 96%   Breastfeeding Unknown   BMI 45.63 kg/m      Assessment and plan  Melissa Albert is a 37 year old female  POD #1 s/p [facility] FULL ROUT OBSTE CARE, DELIV [[id number]] ( SECTION)  [facility]  DELIVERY ONLY [[id number]]  [facility]  DELIVERY+POSTPARTUM CARE [[id number]] with intrathecal 0.75% bupivacaine (1.5mg), fentanyl (15mcg), and morphine (150mcg) and single shot TAP nerve block injections with bupivacaine 0.25% 10mL and long acting liposome bupivacaine (Exparel) 1.3% bilaterally. She is ambulating without difficulty without weakness or paresthesias. There is no evidence of adverse side effects associated with spinal or fascial plane block injections. The patient is receiving adequate incisional pain control at this time and anticipate up to 72 hours of incisional pain control. However, we further anticipate that the patient may require opioid and non-opioid analgesics for visceral and muscle pain that is not controlled with local anesthetic.      In brief summary, " her postoperative analgesia is adequately controlled today. Further interventional analgesic strategies would be of little utility at this time. Thus, we recommend proceeding with PO analgesics including staggered dosing of NSAIDs and acetaminophen with a taper of oxycodone.     Thank you for including us in the care for this patient. If there are any concerns please contact the department of anesthesia OB division ([phone]).    Filomena Bynum MD CA-2   Department of Anesthesiology  [phone]

## 2022-08-02 NOTE — PROVIDER NOTIFICATION
bp 2030 = 165/92  bp 2058 = 158/98  please advise - thank you!   08/01/22 2100   Provider Notification   Provider Name/Title kirby antony   Method of Notification Electronic Page   Request Evaluate-Remote   Notification Reason Vital Signs Change

## 2022-08-02 NOTE — PROVIDER NOTIFICATION
Bp 154/90     08/01/22 2122   Provider Notification   Provider Name/Title [email or web address]   Method of Notification Electronic Page   Request Evaluate-Remote   Notification Reason Vital Signs Change

## 2022-08-02 NOTE — PLAN OF CARE
Goal Outcome Evaluation:     Pt reporting adequate pan control with current pain medication. Pumping independently, visiting infant in NICU. Reports headache is feeling better.

## 2022-08-02 NOTE — PROGRESS NOTES
"Magnesium Check - Postpartum  S:  Patient reports feeling a little tired. She does note a mild headache. Has not yet tried anything for it.  Denies vision changes, chest pain, shortness of breath, RUQ pain, increased edema.    O:  Patient Vitals for the past 24 hrs:   BP Temp Temp src Pulse Resp Height Weight   08/02/22 1205 138/82 -- -- 101 18 -- --   08/02/22 1100 137/77 -- -- 107 18 -- --   08/02/22 1000 (!) 147/90 -- -- 109 18 -- --   08/02/22 0930 133/73 -- -- 91 -- -- --   08/02/22 0900 131/84 -- -- 105 18 -- --   08/02/22 0845 133/84 -- -- 106 18 -- --   08/02/22 0830 138/81 -- -- 104 18 -- --   08/02/22 0815 134/81 97.4  F (36.3  C) Oral 105 18 -- --   08/02/22 0810 (!) 147/87 -- -- 105 18 -- --   08/02/22 0805 (!) 141/88 -- -- 104 18 -- --   08/02/22 0800 (!) 141/86 -- -- 97 18 -- --   08/02/22 0755 (!) 146/88 -- -- 103 18 -- --   08/02/22 0750 (!) 143/83 -- -- 103 18 -- --   08/02/22 0745 128/77 98.1  F (36.7  C) Oral 99 18 -- --   08/02/22 0650 (!) 168/94 -- -- 107 18 -- --   08/02/22 0627 (!) 163/97 -- -- 110 18 -- --   08/02/22 0240 (!) 150/90 -- -- 96 18 -- --   08/02/22 0215 (!) 159/95 -- -- 94 -- -- --   08/01/22 2350 (!) 147/82 -- -- 100 -- -- --   08/01/22 2307 (!) 163/96 -- -- 97 18 -- --   08/01/22 2235 (!) 154/89 98.5  F (36.9  C) Oral 107 18 -- --   08/01/22 2202 (!) 158/98 -- -- -- -- -- --   08/01/22 2124 (!) 154/90 -- -- -- -- -- --   08/01/22 2058 (!) 158/98 -- -- 108 -- -- --   08/01/22 2025 (!) 165/92 98.4  F (36.9  C) Oral 105 18 -- --   08/01/22 1530 (!) 153/96 98.4  F (36.9  C) Oral 99 -- -- --   08/01/22 1515 (!) 151/91 98.3  F (36.8  C) Oral 102 -- -- --   08/01/22 1500 (!) 148/89 98.9  F (37.2  C) Oral 101 -- -- --   08/01/22 1442 (!) 147/101 98.2  F (36.8  C) Oral 104 -- -- --   08/01/22 1439 -- -- -- -- -- -- 116.8 kg (257 lb 9.6 oz)   08/01/22 1359 -- -- -- -- -- 1.6 m (5' 3\") --   08/01/22 1314 139/85 98.4  F (36.9  C) Oral 107 18 -- --     Wt Readings from Last 4 Encounters: "   22 116.8 kg (257 lb 9.6 oz)   18 99.8 kg (220 lb)   12 86.2 kg (190 lb)     Gen:  Resting comfortably, NAD  CV:  RRR  Pulm:  NWOB, CTAB  Abd:  Soft, non-tender, gravid  Ext:  Non-tender, trace  LE edema b/l  Neuro: 1+ patellar reflexes, no beats clonus    UOP: 1050ml since 0700     PreE Labs:  Recent Labs   Lab Test 22  0759 22  0649 22  1009   HGB 9.0* 8.7* 7.5*     Recent Labs   Lab Test 22  0759 22  0540 22  1758    281 283     Recent Labs   Lab Test 22  0759 22  0540 18   ALT 11 <6 33     Recent Labs   Lab Test 22  0759 22  0540 18   AST 6 18 16     Recent Labs   Lab Test 22  0759 22  0540 18   CR 0.38* 0.46* 0.75     No results for input(s): MAG in the last 27120 hours.      Assessment/Plan:  Melissa Albert is a 37 year old  female who is POD#4 s/p PLTCS for hx myomectomy. Pregnancy c/b T2DM, CHTN, and asthma.    Superimposed Preeclampsia with severe features  - HELLP labs normal on , repeat wnl this morning   - Sustained severe range BP this morning               - Labetalol 20 (08)   - Continue close monitoring and treat sustained SR BP systolic >160 or diastolic >110 with IV antihypertensives  - S/p 1D nifedipine 30 mg XL > 1D 60/30 > D#1 Nifedipine 60 BID, D#2/10 HCTZ 25  - Mg 4g (42) > 2g/hr               - Continue for 24h total               - q4h clinical mag checks  - Symptoms: None, continue to monitor.  - UOP: Adequate, continue strict I/O's as above.  - Dull headache: will give oxycodone and reassess (Tylenol due at 1400). Will monitor closely      Dyan Juárez MD  OB/GYN Resident, PGY-2

## 2022-08-02 NOTE — PLAN OF CARE
Patient BP in high range. Provider in room for 1rst pressure reading. Provider notified on floor for second reading. Labetolol to be given.

## 2022-08-02 NOTE — PROVIDER NOTIFICATION
BP @ 0215   159/95  BP  @ 0240  150/90  Patient states dull headache has significantly resolved.    08/02/22 0250   Provider Notification   Provider Name/Title kirby antony   Method of Notification Electronic Page   Request Evaluate-Remote   Notification Reason Status Update

## 2022-08-02 NOTE — PROVIDER NOTIFICATION
08/01/22 2305   Provider Notification   Provider Name/Title g3 cassia   Method of Notification Electronic Page   Request Evaluate-Remote   Notification Reason Status Update   BP @ 6460  158/98  BP @ 2308  163/96  please advise  Thank you.

## 2022-08-02 NOTE — PROGRESS NOTES
Postpartum Progress Note  POD#4    Subjective:  She is resting comfortably in bed this morning. Pain is improving and well controlled on current medication regimen. She is tolerating PO intake. Lochia present and similar to light menses.  She is voiding without difficulty. She has passed flatus and has had bowel movements. She is ambulating without dizziness or difficulty.  She denies lightheadedness/dizziness, changes in vision, nausea/vomiting, chest pain, shortness of breath, RUQ pain, or worsening edema.  Plans to breastfeed, declines contraception. Understands that her blood pressures have been elevated and she needs IV magnesium now.    Objective:  Vitals:    22 0815 22 0830 22 0845 22 0900   BP: 134/81 138/81 133/84 131/84   BP Location:       Patient Position:       Cuff Size:       Pulse: 105 104 106 105   Resp: 18 18 18 18   Temp: 97.4  F (36.3  C)      TempSrc: Oral      SpO2:       Weight:       Height:           General: NAD. A&Ox3.  CV: Regular rate, well perfused.   Pulm: Normal respiratory effort.  Abd: Soft, non-tender, non-distended. Fundus is firm below the umbilicus.    Incision:  incision is clean, dry, intact  Ext: trace lower extremity edema bilaterally. No calf tenderness. Bilateral biceps and brachioradialis reflexes 1+. No clonus.    Assessment/Plan:  Melissa Albert is a 37 year old  female who is POD#4 s/p PLTCS for hx myomectomy. Pregnancy c/b T2DM, CHTN, and asthma.    Routine PP  - Encourage routine post-operative goals including ambulation and incentive spirometry  - PNC: Rh pos. Rubella immune. No intervention indicated.  - Pain: controlled on oral medications  - Heme: Hgb 10.1 > QBL 1615 (TXA, miso, hemabate)> 7.6 > 7.1> 1U pRBCs > 7.6 > 7.8 > 7.5 > 1u pRBC > 8.7 > IV Fe. Will discharge with PO iron  - GI: continue anti-emetics and stool softeners as needed.  - :. Voiding spontaneously.  - Infant: Stable in NICU  - Feeding: Plans on breastfeeding.  - BC:  declines. Previously discussed pregnancy spacing.  - Ppx: Lovenox BID while inpatient    T2DM  - PTA lantus 14U qHS, novolog 1:6 CHO [held]  - BG QID  - Per pt, her endocrinologist at [facility] made an extensive plan to discontinue insulin postpartum, follow-up in 2 weeks, and decide on next steps from there. Typically has continuous glucose monitor. Continue with this previously made plan.    Superimposed Preeclampsia with severe features  - HELLP labs normal on 7/30, repeat pending this morning  - Sustained severe range BP this morning   - Labetalol 20 (0708)   - Continue close monitoring and treat sustained SR BP systolic >160 or diastolic >110 with IV antihypertensives  - S/p 1D nifedipine 30 mg XL > 1D 60/30 > D#1 Nifedipine 60 BID, D#2/10 HCTZ 25  - Mg 4g (0742) > 2g/hr   - Continue for 24h total   - q4h clinical mag checks  - Symptoms: None, continue to monitor.  - UOP: Adequate, continue strict I/O's as above.    Dispo: discharge to home possibly POD#6 or 7. Pending blood pressure control after 24 hours of IV Mg.  Will need follow-up in 1 week for BP check and 6 weeks for routine postpartum visit.    Sarah Gurrola MD  OB/GYN, PGY-3  08/02/2022, 8:10 AM     [facility] staff:  Appreciate note by Dr. Gurrola.  I have seen and examined the patient without the resident. I have reviewed, edited, and agree with the note.        Shyanne Neal MD, FACOG  8/2/2022  9:24 AM

## 2022-08-02 NOTE — PLAN OF CARE
Data: Vital signs within normal limits. Postpartum checks within normal limits - see flow record. Patient eating and drinking normally. Patient able to empty bladder independently and is up ambulating. No apparent signs of infection. Incision healing well. Patient performing self cares and is able to care for infant.  Action: Patient medicated during the shift for pain. See MAR. Patient reassessed within 1 hour after each medication and pain was improved - patient stated she was comfortable. Patient education done about hypertension management, pumping. See flow record.  Response: Positive attachment behaviors observed with infant. Support persons  present.   Plan: Anticipate discharge on Thursday.

## 2022-08-03 LAB
ALT SERPL W P-5'-P-CCNC: 17 U/L (ref 0–50)
AST SERPL W P-5'-P-CCNC: 14 U/L (ref 0–45)
CREAT SERPL-MCNC: 0.38 MG/DL (ref 0.52–1.04)
GFR SERPL CREATININE-BSD FRML MDRD: >90 ML/MIN/1.73M2
GLUCOSE BLDC GLUCOMTR-MCNC: 112 MG/DL (ref 70–99)
GLUCOSE BLDC GLUCOMTR-MCNC: 135 MG/DL (ref 70–99)
GLUCOSE BLDC GLUCOMTR-MCNC: 150 MG/DL (ref 70–99)
GLUCOSE BLDC GLUCOMTR-MCNC: 97 MG/DL (ref 70–99)
HGB BLD-MCNC: 9.8 G/DL (ref 11.7–15.7)
PLATELET # BLD AUTO: 384 10E3/UL (ref 150–450)

## 2022-08-03 PROCEDURE — 84460 ALANINE AMINO (ALT) (SGPT): CPT | Performed by: STUDENT IN AN ORGANIZED HEALTH CARE EDUCATION/TRAINING PROGRAM

## 2022-08-03 PROCEDURE — 120N000002 HC R&B MED SURG/OB UMMC

## 2022-08-03 PROCEDURE — 84450 TRANSFERASE (AST) (SGOT): CPT | Performed by: STUDENT IN AN ORGANIZED HEALTH CARE EDUCATION/TRAINING PROGRAM

## 2022-08-03 PROCEDURE — 250N000013 HC RX MED GY IP 250 OP 250 PS 637: Performed by: OBSTETRICS & GYNECOLOGY

## 2022-08-03 PROCEDURE — 250N000013 HC RX MED GY IP 250 OP 250 PS 637: Performed by: STUDENT IN AN ORGANIZED HEALTH CARE EDUCATION/TRAINING PROGRAM

## 2022-08-03 PROCEDURE — 250N000011 HC RX IP 250 OP 636: Performed by: STUDENT IN AN ORGANIZED HEALTH CARE EDUCATION/TRAINING PROGRAM

## 2022-08-03 PROCEDURE — 85049 AUTOMATED PLATELET COUNT: CPT | Performed by: STUDENT IN AN ORGANIZED HEALTH CARE EDUCATION/TRAINING PROGRAM

## 2022-08-03 PROCEDURE — 250N000011 HC RX IP 250 OP 636

## 2022-08-03 PROCEDURE — 82565 ASSAY OF CREATININE: CPT | Performed by: STUDENT IN AN ORGANIZED HEALTH CARE EDUCATION/TRAINING PROGRAM

## 2022-08-03 PROCEDURE — 36415 COLL VENOUS BLD VENIPUNCTURE: CPT | Performed by: STUDENT IN AN ORGANIZED HEALTH CARE EDUCATION/TRAINING PROGRAM

## 2022-08-03 PROCEDURE — 85018 HEMOGLOBIN: CPT | Performed by: STUDENT IN AN ORGANIZED HEALTH CARE EDUCATION/TRAINING PROGRAM

## 2022-08-03 RX ORDER — LABETALOL 200 MG/1
200 TABLET, FILM COATED ORAL EVERY 12 HOURS SCHEDULED
Status: DISCONTINUED | OUTPATIENT
Start: 2022-08-03 | End: 2022-08-04 | Stop reason: HOSPADM

## 2022-08-03 RX ADMIN — ACETAMINOPHEN 975 MG: 325 TABLET, FILM COATED ORAL at 08:44

## 2022-08-03 RX ADMIN — ENOXAPARIN SODIUM 40 MG: 40 INJECTION SUBCUTANEOUS at 02:08

## 2022-08-03 RX ADMIN — ACETAMINOPHEN 975 MG: 325 TABLET, FILM COATED ORAL at 14:19

## 2022-08-03 RX ADMIN — SENNOSIDES AND DOCUSATE SODIUM 2 TABLET: 50; 8.6 TABLET ORAL at 08:44

## 2022-08-03 RX ADMIN — LABETALOL HYDROCHLORIDE 200 MG: 200 TABLET, FILM COATED ORAL at 21:55

## 2022-08-03 RX ADMIN — ACETAMINOPHEN 975 MG: 325 TABLET, FILM COATED ORAL at 02:07

## 2022-08-03 RX ADMIN — ENOXAPARIN SODIUM 40 MG: 40 INJECTION SUBCUTANEOUS at 14:19

## 2022-08-03 RX ADMIN — IBUPROFEN 800 MG: 800 TABLET, FILM COATED ORAL at 08:44

## 2022-08-03 RX ADMIN — OXYCODONE HYDROCHLORIDE 5 MG: 5 TABLET ORAL at 02:12

## 2022-08-03 RX ADMIN — SENNOSIDES AND DOCUSATE SODIUM 1 TABLET: 50; 8.6 TABLET ORAL at 19:48

## 2022-08-03 RX ADMIN — IBUPROFEN 800 MG: 800 TABLET, FILM COATED ORAL at 02:08

## 2022-08-03 RX ADMIN — NIFEDIPINE 60 MG: 30 TABLET, FILM COATED, EXTENDED RELEASE ORAL at 08:44

## 2022-08-03 RX ADMIN — HYDROCHLOROTHIAZIDE 25 MG: 25 TABLET ORAL at 08:44

## 2022-08-03 RX ADMIN — ACETAMINOPHEN 975 MG: 325 TABLET, FILM COATED ORAL at 19:49

## 2022-08-03 RX ADMIN — IBUPROFEN 800 MG: 800 TABLET, FILM COATED ORAL at 14:19

## 2022-08-03 RX ADMIN — MAGNESIUM SULFATE HEPTAHYDRATE 2 G/HR: 40 INJECTION, SOLUTION INTRAVENOUS at 04:36

## 2022-08-03 RX ADMIN — IBUPROFEN 800 MG: 800 TABLET, FILM COATED ORAL at 19:49

## 2022-08-03 RX ADMIN — NIFEDIPINE 60 MG: 30 TABLET, FILM COATED, EXTENDED RELEASE ORAL at 19:56

## 2022-08-03 ASSESSMENT — ACTIVITIES OF DAILY LIVING (ADL)
ADLS_ACUITY_SCORE: 20

## 2022-08-03 NOTE — PLAN OF CARE
Data: Vital signs within normal limits. Postpartum checks within normal limits - see flow record. Patient eating and drinking normally. Patient able to empty bladder independently and is up ambulating. No apparent signs of infection. Incision healing well. Patient performing self cares and is able to care for infant.  Action: Patient medicated during the shift for pain. See MAR. Patient reassessed within 1 hour after each medication and pain was improved - patient stated she was comfortable. Patient education done about hypertension post partum, plan of care. See flow record.  Response: Positive attachment behaviors observed with infant. Support persons Zeb present.   Plan: Anticipate discharge on tomorrow.Goal Outcome Evaluation:

## 2022-08-03 NOTE — PROGRESS NOTES
Magnesium Check / Postpartum Progress Note  POD#5    S:  Patient reports that she is doing well. Pain is improving and well controlled on current medication regimen. She is tolerating PO intake. Lochia present and similar to light menses.  She is voiding without difficulty. She has passed flatus and has had bowel movements. She is ambulating without dizziness or difficulty.  She denies lightheadedness/dizziness, headache, changes in vision, nausea/vomiting, chest pain, shortness of breath, RUQ pain, or worsening edema. Excited to be done with the magnesium.    O:  Patient Vitals for the past 24 hrs:   BP Temp Temp src Pulse Resp SpO2 Weight   08/03/22 0840 (!) 147/82 98.2  F (36.8  C) Oral 110 18 -- --   08/03/22 0655 -- -- -- -- -- -- 113.6 kg (250 lb 8 oz)   08/03/22 0437 137/82 98.5  F (36.9  C) Oral 93 18 95 % --   08/02/22 2325 (!) 145/86 98  F (36.7  C) Oral 86 18 97 % --   08/02/22 1950 (!) 154/91 98.7  F (37.1  C) Oral 108 18 -- --   08/02/22 1616 (!) 154/92 98.3  F (36.8  C) Oral -- 18 -- --   08/02/22 1405 (!) 143/82 -- -- 98 18 -- --   08/02/22 1300 (!) 158/88 -- -- 99 18 -- --   08/02/22 1205 138/82 -- -- 101 18 -- --   08/02/22 1100 137/77 -- -- 107 18 -- --     Gen:  Resting comfortably, NAD  CV:  RRR, Well-perfused  Pulm:  CTAB, Breathing comfortably on room air  Abd:  Soft, non-tender, appropriately tender  Ext:  Non-tender, trace  LE edema b/l  Neuro: 1+ biceps and brachioradialis reflexes    I/O last 3 completed shifts:  In: 5677.08 [P.O.:2750; I.V.:2927.08]  Out: 3900 [Urine:3900]    Wt Readings from Last 2 Encounters:   08/03/22 113.6 kg (250 lb 8 oz)   07/04/18 99.8 kg (220 lb)     UOP: 0.64 ml/kg/hr since 0000    Recent Labs   Lab 08/02/22  0759 08/01/22  0649 07/31/22  1009 07/30/22  1330 07/30/22  0540 07/29/22  2313 07/29/22  1758   HGB 9.0* 8.7* 7.5*   < > 7.1*   < > 7.6*     --   --   --  281  --  283   CR 0.38*  --   --   --  0.46*  --   --    AST 6  --   --   --  18  --   --     ALT 11  --   --   --  <6  --   --     < > = values in this interval not displayed.     Assessment/Plan:  Melissa Albert is a 37 year old  female who is POD#5 s/p PLTCS for hx myomectomy. Pregnancy c/b T2DM, CHTN, and asthma.    Routine PP  - Encourage routine post-operative goals including ambulation and incentive spirometry  - PNC: Rh pos. Rubella immune. No intervention indicated.  - Pain: controlled on oral medications  - Heme: Hgb 10.1 > QBL 1615 (TXA, miso, hemabate)> 7.6 > 7.1> 1U pRBCs > 7.6 > 7.8 > 7.5 > 1u pRBC > 8.7 > IV Fe. Will discharge with PO iron  - GI: continue anti-emetics and stool softeners as needed.  - :. Voiding spontaneously.  - Infant: Stable in NICU  - Feeding: Plans on breastfeeding.  - BC: declines. Previously discussed pregnancy spacing.  - Ppx: Lovenox BID while inpatient     Superimposed Preeclampsia with severe features  - HELLP labs normal on , repeat wnl this morning   - Sustained severe range BP this morning               - Labetalol 20 (0708)   - Continue close monitoring and treat sustained SR BP systolic >160 or diastolic >110 with IV antihypertensives  - S/p 1D nifedipine 30 mg XL > 1D 60/30 > D#2 Nifedipine 60 BID, D#3/10 HCTZ 25  - Mg 4g (0742) > 2g/hr               - Continue for 24h total. Due to stop at 0742 today.  - Symptoms: Headache, now resolved. Continue to monitor.  - UOP: Adequate. Continue strict I/O's as above.     T2DM  - PTA lantus 14U qHS, novolog 1:6 CHO [held]  - BG QID  - Per pt, her endocrinologist at [facility] made an extensive plan to discontinue insulin postpartum, follow-up in 2 weeks, and decide on next steps from there. Typically has continuous glucose monitor. Continue with this previously made plan.    Dispo: discharge to home possibly POD#6 or 7. Pending blood pressure control after 24 hours of IV Mg.  Will need to have BP under goal of 150/100 on stable oral antihypertensive regimen for 24 hours off Magnesium.  Will need follow-up in 1  week for BP check and 6 weeks for routine postpartum visit.    Sarah Gurrola MD  OB/GYN, PGY-3  08/03/2022, 10:57 AM     Staff MD Note    I appreciate the note by Dr. Gurrola.  Any necessary changes have been made by me.  I saw and evaluated the patient and agree with the findings and plan of care as documented in the note.    Sarah Ibarra MD

## 2022-08-03 NOTE — PROGRESS NOTES
Magnesium Check - Postpartum    S:  Patient reports that she is doing well. Her headache from yesterday has resolved. She otherwise is feeling well. Denies vision changes, chest pain, shortness of breath, RUQ pain, increased edema.    O:  Patient Vitals for the past 24 hrs:   BP Temp Temp src Pulse Resp SpO2   08/02/22 2325 (!) 145/86 -- Oral 86 18 97 %   08/02/22 1950 (!) 154/91 98.7  F (37.1  C) Oral 108 18 --   08/02/22 1616 (!) 154/92 98.3  F (36.8  C) Oral -- 18 --   08/02/22 1405 (!) 143/82 -- -- 98 18 --   08/02/22 1300 (!) 158/88 -- -- 99 18 --   08/02/22 1205 138/82 -- -- 101 18 --   08/02/22 1100 137/77 -- -- 107 18 --   08/02/22 1000 (!) 147/90 -- -- 109 18 --   08/02/22 0930 133/73 -- -- 91 -- --   08/02/22 0900 131/84 -- -- 105 18 --   08/02/22 0845 133/84 -- -- 106 18 --   08/02/22 0830 138/81 -- -- 104 18 --   08/02/22 0815 134/81 97.4  F (36.3  C) Oral 105 18 --   08/02/22 0810 (!) 147/87 -- -- 105 18 --   08/02/22 0805 (!) 141/88 -- -- 104 18 --   08/02/22 0800 (!) 141/86 -- -- 97 18 --   08/02/22 0755 (!) 146/88 -- -- 103 18 --   08/02/22 0750 (!) 143/83 -- -- 103 18 --   08/02/22 0745 128/77 98.1  F (36.7  C) Oral 99 18 --   08/02/22 0650 (!) 168/94 -- -- 107 18 --   08/02/22 0627 (!) 163/97 -- -- 110 18 --     Gen:  Resting comfortably, NAD  CV:  RRR, Well-perfused  Pulm:  CTAB, Breathing comfortably on room air  Abd:  Soft, non-tender, appropriately tender  Ext:  Non-tender, trace  LE edema b/l  Neuro: 1+ biceps and brachioradialis reflexes    Intake/Output Summary (Last 24 hours) at 8/2/2022 2314  Last data filed at 8/2/2022 1900  Gross per 24 hour   Intake 3383.33 ml   Output 1250 ml   Net 2133.33 ml     Wt Readings from Last 2 Encounters:   08/01/22 116.8 kg (257 lb 9.6 oz)   07/04/18 99.8 kg (220 lb)     UOP: 1.28 ml/kg/hr since 0000    Recent Labs   Lab 08/02/22  0759 08/01/22  0649 07/31/22  1009 07/30/22  1330 07/30/22  0540 07/29/22  2313 07/29/22  1758   HGB 9.0* 8.7* 7.5*   < > 7.1*    < > 7.6*     --   --   --  281  --  283   CR 0.38*  --   --   --  0.46*  --   --    AST 6  --   --   --  18  --   --    ALT 11  --   --   --  <6  --   --     < > = values in this interval not displayed.     Assessment/Plan:  Melissa Albert is a 37 year old  female who is POD#5 s/p PLTCS for hx myomectomy. Pregnancy c/b T2DM, CHTN, and asthma.    Superimposed Preeclampsia with severe features  - HELLP labs normal on , repeat wnl this morning   - Sustained severe range BP this morning               - Labetalol 20 (0708)   - Continue close monitoring and treat sustained SR BP systolic >160 or diastolic >110 with IV antihypertensives  - S/p 1D nifedipine 30 mg XL > 1D 60/30 > D#2 Nifedipine 60 BID, D#3/10 HCTZ 25  - Mg 4g (0742) > 2g/hr               - Continue for 24h total               - q4h clinical mag checks  - Symptoms: Headache, now resolved. Continue to monitor.  - UOP: Adequate. Continue strict I/O's as above.     Sarah Gurrola MD  OB/GYN, PGY-3  2022, 3:51 AM

## 2022-08-03 NOTE — PROGRESS NOTES
Magnesium Check - Postpartum  S:  Patient reports feeling a little tired.  Her headache has been present all day, but is not too bad, currently a 2/10.  She endorses feeling flushed and tired on the magnesium.  She accidentally pulled her IV out, and the nurse is currently working on cleaning up the blood and getting a new IV in.  She otherwise is feeling well. Denies vision changes, chest pain, shortness of breath, RUQ pain, increased edema.    O:  Patient Vitals for the past 24 hrs:   BP Temp Temp src Pulse Resp   08/02/22 1950 (!) 154/91 98.7  F (37.1  C) Oral 108 18   08/02/22 1616 (!) 154/92 98.3  F (36.8  C) Oral -- 18   08/02/22 1405 (!) 143/82 -- -- 98 18   08/02/22 1300 (!) 158/88 -- -- 99 18   08/02/22 1205 138/82 -- -- 101 18   08/02/22 1100 137/77 -- -- 107 18   08/02/22 1000 (!) 147/90 -- -- 109 18   08/02/22 0930 133/73 -- -- 91 --   08/02/22 0900 131/84 -- -- 105 18   08/02/22 0845 133/84 -- -- 106 18   08/02/22 0830 138/81 -- -- 104 18   08/02/22 0815 134/81 97.4  F (36.3  C) Oral 105 18   08/02/22 0810 (!) 147/87 -- -- 105 18   08/02/22 0805 (!) 141/88 -- -- 104 18   08/02/22 0800 (!) 141/86 -- -- 97 18   08/02/22 0755 (!) 146/88 -- -- 103 18   08/02/22 0750 (!) 143/83 -- -- 103 18   08/02/22 0745 128/77 98.1  F (36.7  C) Oral 99 18   08/02/22 0650 (!) 168/94 -- -- 107 18   08/02/22 0627 (!) 163/97 -- -- 110 18   08/02/22 0240 (!) 150/90 -- -- 96 18   08/02/22 0215 (!) 159/95 -- -- 94 --   08/01/22 2350 (!) 147/82 -- -- 100 --     Wt Readings from Last 4 Encounters:   08/01/22 116.8 kg (257 lb 9.6 oz)   07/04/18 99.8 kg (220 lb)   07/22/12 86.2 kg (190 lb)     Gen:  Resting comfortably, NAD  CV:  RRR  Pulm:  NWOB, CTAB  Abd:  Soft, non-tender, gravid  Ext:  Non-tender, trace  LE edema b/l  Neuro: 1+ biceps and brachioradialis reflexes, no beats clonus    Intake/Output Summary (Last 24 hours) at 8/2/2022 2314  Last data filed at 8/2/2022 1900  Gross per 24 hour   Intake 3383.33 ml   Output 1250 ml    Net 2133.33 ml       Recent Labs   Lab 22  0759 22  0649 22  1009 22  1330 22  0540 22  2313 22  1758   HGB 9.0* 8.7* 7.5*   < > 7.1*   < > 7.6*     --   --   --  281  --  283   CR 0.38*  --   --   --  0.46*  --   --    AST 6  --   --   --  18  --   --    ALT 11  --   --   --  <6  --   --     < > = values in this interval not displayed.     Assessment/Plan:  Melissa Albert is a 37 year old  female who is POD#4 s/p PLTCS for hx myomectomy. Pregnancy c/b T2DM, CHTN, and asthma.    Superimposed Preeclampsia with severe features  - HELLP labs normal on , repeat wnl this morning   - Sustained severe range BP this morning               - Labetalol 20 (0708)   - Continue close monitoring and treat sustained SR BP systolic >160 or diastolic >110 with IV antihypertensives  - S/p 1D nifedipine 30 mg XL > 1D 60/30 > D#1 Nifedipine 60 BID, D#2/10 HCTZ 25   - Last BP was high mild range 3 hours ago. Asked RN to check another BP. Consider adding on a 3rd agent, likely labetalol, if still high mild range.  - Mg 4g (0742) > 2g/hr               - Continue for 24h total               - q4h clinical mag checks  - Symptoms: Mild headache, improving with Tylenol. Continue to monitor.  - UOP: Only 200 ml charted since 1500. Called RN, who stated that she has had at least 400 ml out since then, and potentially more because the patient is keeping track of her own voids. Asked RN to please chart the UOP. Continue strict I/O's as above.     Sarah Gurrola MD  OB/GYN, PGY-3  2022, 11:15 PM

## 2022-08-03 NOTE — PLAN OF CARE
VSS on RA, BP at mild range. Pt is on Magnesium Sulfate infusing at 50 ml/hr via left PIV, LR at 75 ml/hr. No s/sx of toxicity noted. DTR +1, clonus 0. Lower abdominal incision SARAH, c/d/i. Scant lochia. Adequate I&O. Breast pumping independently. Infant at NICU. Continue plan of care.    Vitals:    08/02/22 1950 08/02/22 2325 08/03/22 0437 08/03/22 0655   BP: (!) 154/91 (!) 145/86 137/82    BP Location: Left arm Right arm Right arm    Patient Position: Sitting Semi-Talamantes's Supine    Cuff Size: Adult Large Adult Large Adult Large    Pulse: 108 86 93    Resp: 18 18 18    Temp: 98.7  F (37.1  C) 98  F (36.7  C) 98.5  F (36.9  C)    TempSrc: Oral Oral Oral    SpO2:  97% 95%    Weight:    113.6 kg (250 lb 8 oz)   Height:

## 2022-08-04 VITALS
RESPIRATION RATE: 16 BRPM | DIASTOLIC BLOOD PRESSURE: 80 MMHG | HEART RATE: 101 BPM | BODY MASS INDEX: 42.95 KG/M2 | SYSTOLIC BLOOD PRESSURE: 134 MMHG | WEIGHT: 242.4 LBS | OXYGEN SATURATION: 98 % | TEMPERATURE: 98.8 F | HEIGHT: 63 IN

## 2022-08-04 LAB
GLUCOSE BLDC GLUCOMTR-MCNC: 103 MG/DL (ref 70–99)
GLUCOSE BLDC GLUCOMTR-MCNC: 109 MG/DL (ref 70–99)

## 2022-08-04 PROCEDURE — 250N000013 HC RX MED GY IP 250 OP 250 PS 637: Performed by: OBSTETRICS & GYNECOLOGY

## 2022-08-04 PROCEDURE — 250N000013 HC RX MED GY IP 250 OP 250 PS 637: Performed by: STUDENT IN AN ORGANIZED HEALTH CARE EDUCATION/TRAINING PROGRAM

## 2022-08-04 PROCEDURE — 250N000011 HC RX IP 250 OP 636

## 2022-08-04 RX ORDER — LABETALOL 200 MG/1
200 TABLET, FILM COATED ORAL EVERY 12 HOURS
Qty: 60 TABLET | Refills: 1 | Status: SHIPPED | OUTPATIENT
Start: 2022-08-04 | End: 2023-10-06

## 2022-08-04 RX ADMIN — IBUPROFEN 800 MG: 800 TABLET, FILM COATED ORAL at 02:37

## 2022-08-04 RX ADMIN — ENOXAPARIN SODIUM 40 MG: 40 INJECTION SUBCUTANEOUS at 13:49

## 2022-08-04 RX ADMIN — ACETAMINOPHEN 975 MG: 325 TABLET, FILM COATED ORAL at 08:48

## 2022-08-04 RX ADMIN — ACETAMINOPHEN 975 MG: 325 TABLET, FILM COATED ORAL at 02:36

## 2022-08-04 RX ADMIN — ENOXAPARIN SODIUM 40 MG: 40 INJECTION SUBCUTANEOUS at 02:37

## 2022-08-04 RX ADMIN — SENNOSIDES AND DOCUSATE SODIUM 1 TABLET: 50; 8.6 TABLET ORAL at 08:00

## 2022-08-04 RX ADMIN — LABETALOL HYDROCHLORIDE 200 MG: 200 TABLET, FILM COATED ORAL at 08:00

## 2022-08-04 RX ADMIN — IBUPROFEN 800 MG: 800 TABLET, FILM COATED ORAL at 08:48

## 2022-08-04 RX ADMIN — NIFEDIPINE 60 MG: 30 TABLET, FILM COATED, EXTENDED RELEASE ORAL at 07:59

## 2022-08-04 RX ADMIN — HYDROCHLOROTHIAZIDE 25 MG: 25 TABLET ORAL at 08:00

## 2022-08-04 ASSESSMENT — ACTIVITIES OF DAILY LIVING (ADL)
ADLS_ACUITY_SCORE: 20

## 2022-08-04 NOTE — PROGRESS NOTES
Postpartum Progress Note    S:  She is resting comfortably in bed this morning. Pain is currently non-existent. She is tolerating PO intake. Lochia present but very light.  She is voiding without difficulty. She has passed flatus and has had bowel movements. She is ambulating without dizziness or difficulty.  She denies lightheadedness/dizziness, changes in vision, nausea/vomiting, chest pain, shortness of breath, RUQ pain, or worsening edema    O:  Patient Vitals for the past 24 hrs:   BP Temp Temp src Pulse Resp SpO2 Weight   22 0635 139/87 98.8  F (37.1  C) Oral 98 16 98 % --   22 0500 -- -- -- -- -- -- 110 kg (242 lb 6.4 oz)   22 0300 138/81 98.9  F (37.2  C) Oral 96 16 -- --   22 2153 (!) 145/86 -- -- 90 -- -- --   22 1949 (!) 153/97 98.9  F (37.2  C) Oral 109 16 98 % --   22 1549 (!) 146/87 98.8  F (37.1  C) Oral 101 16 -- --   22 1225 (!) 136/95 -- -- 95 18 -- --   22 0840 (!) 147/82 98.2  F (36.8  C) Oral 110 18 -- --     General: NAD. A&Ox3.  CV: Regular rate, well perfused.   Pulm: Normal respiratory effort.  Abd: Soft, non-tender, non-distended. Fundus is firm below the umbilicus.    Incision:  incision is clean, dry, intact  Ext: trace lower extremity edema bilaterally.    Recent Labs   Lab 22  1426 22  0759 22  0649 22  1330 22  0540   HGB 9.8* 9.0* 8.7*   < > 7.1*    346  --   --  281   CR 0.38* 0.38*  --   --  0.46*   AST 14 6  --   --  18   ALT 17 11  --   --  <6    < > = values in this interval not displayed.     Assessment/Plan:  Melissa Albert is a 37 year old  female who is POD#6 s/p PLTCS for hx myomectomy. Pregnancy c/b T2DM, CHTN, and asthma.    Routine PP  - Encourage routine post-operative goals including ambulation and incentive spirometry  - PNC: Rh pos. Rubella immune. No intervention indicated.  - Pain: controlled on oral medications  - Heme: Hgb 10.1 > QBL 1615 (TXA, miso, hemabate)> 7.6 > 7.1> 1U  pRBCs > 7.6 > 7.8 > 7.5 > 1u pRBC > 8.7 > IV Fe. Will discharge with PO iron. Acute blood loss anemia due to PPH-- currently improved s/p transfusion.   - GI: continue anti-emetics and stool softeners as needed.  - :. Voiding spontaneously.  - Infant: Stable in NICU  - Feeding: Plans on breastfeeding.  - BC: declines. Previously discussed pregnancy spacing.  - Ppx: Lovenox BID while inpatient    Superimposed Preeclampsia with severe features  - HELLP labs normal on 7/30, repeat wnl 8/4  - Continue close monitoring and treat sustained SR BP systolic >160 or diastolic >110 with IV antihypertensives  - D#3 Nifedipine 60 BID, D#4/10 HCTZ 25, D#2 labetalol 200 mg BID  - S/p 24h Magnesium  - Symptoms: Headache, now resolved. Continue to monitor.    T2DM  - PTA lantus 14U qHS, novolog 1:6 CHO [held]  - BG QID  - Per pt, her endocrinologist at [facility] made an extensive plan to discontinue insulin postpartum, follow-up in 2 weeks, and decide on next steps from there. Typically has continuous glucose monitor. Continue with this previously made plan.     Dispo: Possible PM discharge today. Will need to have BP under goal of 150/100 on stable oral antihypertensive regimen for 24 hours off Magnesium.  Will need follow-up in 1 week for BP check and 6 weeks for routine postpartum visit.     Sarah Gurrola MD  OB/GYN, PGY-3  08/04/2022, 6:56 AM     I have seen and examined the patient without the resident. I have reviewed, edited, and agree with the note.   My findings are: eager to go when possible  Appears well  Abdomen soft NT ND  Incision CDI  Lower extremities without edema/erythema  Results for orders placed or performed during the hospital encounter of 07/29/22 (from the past 24 hour(s))   Glucose by meter   Result Value Ref Range    GLUCOSE BY METER POCT 97 70 - 99 mg/dL   ALT   Result Value Ref Range    ALT 17 0 - 50 U/L   AST   Result Value Ref Range    AST 14 0 - 45 U/L   Creatinine   Result Value Ref Range    Creatinine  0.38 (L) 0.52 - 1.04 mg/dL    GFR Estimate >90 >60 mL/min/1.73m2   Hemoglobin   Result Value Ref Range    Hemoglobin 9.8 (L) 11.7 - 15.7 g/dL   Platelet count   Result Value Ref Range    Platelet Count 384 150 - 450 10e3/uL   Glucose by meter   Result Value Ref Range    GLUCOSE BY METER POCT 135 (H) 70 - 99 mg/dL   Glucose by meter   Result Value Ref Range    GLUCOSE BY METER POCT 112 (H) 70 - 99 mg/dL   Glucose by meter   Result Value Ref Range    GLUCOSE BY METER POCT 109 (H) 70 - 99 mg/dL       Gabi Rausch MD

## 2022-08-04 NOTE — PLAN OF CARE
"  Problem: Plan of Care - These are the overarching goals to be used throughout the patient stay.    Goal: Patient-Specific Goal (Individualized)  Description: You can add care plan individualizations to a care plan. Examples of Individualization might be:  \"Parent requests to be called daily at 9am for status\", \"I have a hard time hearing out of my right ear\", or \"Do not touch me to wake me up as it startles me\".  Outcome: Ongoing, Progressing  Flowsheets (Taken 8/4/2022 0513)  Patient-Specific Goals (Include Timeframe): pumping for infant   Goal Outcome Evaluation:           Data: VSS, postpartum assessments WNL. She is voiding without difficulty, up ad rosa, passing gas, eating and drinking normally. Incision appears to be healing well, lochia WNL, no s/s infection. Pumping independently for infant in NICU.  Taking tylenol and ibuprofen for pain. Reports good pain management.   Action: Education provided on plan of care.  Response: Pt is agreeable with her plan of care. Pt visits infant in nicu.  Will continue with plan of care.             "

## 2022-08-04 NOTE — PLAN OF CARE
Data: Vital signs within normal limits except for hypertension (currently being treated with oral antihypertensives) and tachycardia, provider updated and aware. Postpartum checks within normal limits - see flow record. Patient eating and drinking normally. Patient able to empty bladder independently and is up ambulating. Discussed with patient that it is important to continue with strict intake and output, urine hat replaced. No apparent signs of infection. C/S dressing is clean, dry, and intact. PIV infiltrated, removed, pt has no IV access. Patient performing self cares and is breast pumping for baby in NICU, patient happy that she is getting more breast milk.  Action: Pain has been adequately managed with oral medications.   Response: Patient visited baby in NICU this evening, pt reports baby is doing well and feeding well.   Plan: Continue with the plan of care.

## 2022-08-04 NOTE — PLAN OF CARE
Problem: Plan of Care - These are the overarching goals to be used throughout the patient stay.    Goal: Readiness for Transition of Care  Outcome: Met   Goal Outcome Evaluation:  VSS. Met outcomes for discharge. Reviewed discharge instructions and medications, patient verbalizes understanding. Teach back regarding pre-eclampsia.

## 2022-08-04 NOTE — DISCHARGE INSTRUCTIONS
Preeclampsia   Call your doctor right away if you have any of the following:  - Edema (swelling) in your face or hands  - Rapid weight gain-about 1 pound or more in a day  - Headache  - Abdominal pain on your right side  - Vision problems (flashes or spots)  - You have questions or concerns once you return home.    Discharge Instructions for  Section ()  You had a  section, or . During the , your baby was delivered through an incision in your stomach and uterus. Full recovery after a  can take time. It s important to take care of yourself -- for your own sake and because your new baby needs you. Here are some guidelines to follow at home.  Incision care  Here's how to take care of your incision:  Shower as needed. Pat your incision dry.  Watch your incision for signs of infection, like more redness or drainage.  Hold a pillow against the incision when you laugh or cough and when you get up from a lying or sitting position.  Remember, it can take as long as 6 weeks for your incision to heal.  Activity  Here are some suggestions:  Don t try to take care of anyone other than your baby and yourself.  Remember, the more active you are, the more likely you are to have an increase in your bleeding.  Get lots of rest. Take naps in the afternoon.  Increase your activities bit by bit.  Plan your activities so that you don t have to go up or down stairs more than needed.  Do postsurgical deep breathing and coughing exercises. Ask your healthcare provider for instructions.  Don t lift anything heavier than your baby until your healthcare provider tells you it s OK.  Don t drive until your healthcare provider says it s OK.  Don t have sexual intercourse until after you ve had a checkup with your healthcare provider and you have decided on a birth control method.  Allow others to do things for you. Don't hesitate to ask for help.  Follow-up  Make a follow-up appointment as  directed by our staff.  When to call your healthcare provider  Call your healthcare provider right away if you have any of these:  Fever of 100.4 F (38 C) or higher  Redness, pain, or drainage at your incision site  Bleeding that requires a new sanitary pad every hour  Severe pain in the abdomen  Pain or urgency with urination  Foul odor from vaginal discharge  Trouble urinating or emptying your bladder  No bowel movement within 1 week after the birth of your baby  Swollen, red, painful area in the leg  Appearance of rash or hives  Sore, red, painful area on the breasts that may come with flu-like symptoms  Feelings of anxiety, panic, and/or depression  [employer] last reviewed this educational content on 2/1/2018 2000-2021 [employer]. All rights reserved. This information is not intended as a substitute for professional medical care. Always follow your healthcare professional's instructions.

## 2022-08-04 NOTE — PROVIDER NOTIFICATION
08/03/22 2004   Provider Notification   Provider Name/Title Dr. Alexander ([id number])   Method of Notification Electronic Page   Request Evaluate-Remote   Notification Reason Vital Signs Change   Patients BP this evening was 153/97, pulse 107. She got her evening Procardia dose but did you want to add anything else? Thanks.

## 2022-08-08 ENCOUNTER — TELEPHONE (OUTPATIENT)
Dept: OBGYN | Facility: CLINIC | Age: 37
End: 2022-08-08

## 2022-08-08 NOTE — TELEPHONE ENCOUNTER
Pt called with BP 86/56 lightheaded and dizzy.  Pt has 3 BP meds.  Paged MD on-call for medication advisement.    MD advised for pt to discontinue the hydrochlorothiazide.  If pt is still symptomatic, pt should discontinue the evening dose of Labetalol.  Pt to call back if still low bp and symptomatic.    Pt advised and pt verbalized understanding.

## 2022-09-03 ENCOUNTER — HEALTH MAINTENANCE LETTER (OUTPATIENT)
Age: 37
End: 2022-09-03

## 2022-09-30 ENCOUNTER — TELEPHONE (OUTPATIENT)
Dept: OBGYN | Facility: CLINIC | Age: 37
End: 2022-09-30

## 2022-09-30 NOTE — TELEPHONE ENCOUNTER
Forms have been filled out and placed on providers desk for signature.       Forms have been signed and faxed.

## 2022-10-28 ENCOUNTER — MYC MEDICAL ADVICE (OUTPATIENT)
Dept: OTOLARYNGOLOGY | Facility: CLINIC | Age: 37
End: 2022-10-28

## 2023-01-15 ENCOUNTER — HEALTH MAINTENANCE LETTER (OUTPATIENT)
Age: 38
End: 2023-01-15

## 2023-04-29 ENCOUNTER — HEALTH MAINTENANCE LETTER (OUTPATIENT)
Age: 38
End: 2023-04-29

## 2023-08-23 ENCOUNTER — TRANSFERRED RECORDS (OUTPATIENT)
Dept: HEALTH INFORMATION MANAGEMENT | Facility: CLINIC | Age: 38
End: 2023-08-23
Payer: COMMERCIAL

## 2023-08-23 RX ORDER — INSULIN GLARGINE 100 [IU]/ML
INJECTION, SOLUTION SUBCUTANEOUS AT BEDTIME
COMMUNITY
End: 2023-08-23

## 2023-08-24 ENCOUNTER — ANESTHESIA EVENT (OUTPATIENT)
Dept: SURGERY | Facility: CLINIC | Age: 38
End: 2023-08-24
Payer: COMMERCIAL

## 2023-08-24 ENCOUNTER — HOSPITAL ENCOUNTER (OUTPATIENT)
Facility: CLINIC | Age: 38
Discharge: HOME | End: 2023-08-24
Attending: OBSTETRICS & GYNECOLOGY | Admitting: OBSTETRICS & GYNECOLOGY
Payer: COMMERCIAL

## 2023-08-24 ENCOUNTER — ANESTHESIA (OUTPATIENT)
Dept: SURGERY | Facility: CLINIC | Age: 38
End: 2023-08-24
Payer: COMMERCIAL

## 2023-08-24 VITALS
TEMPERATURE: 97.1 F | HEART RATE: 97 BPM | SYSTOLIC BLOOD PRESSURE: 135 MMHG | OXYGEN SATURATION: 96 % | RESPIRATION RATE: 18 BRPM | WEIGHT: 242 LBS | HEIGHT: 63 IN | DIASTOLIC BLOOD PRESSURE: 72 MMHG | BODY MASS INDEX: 42.88 KG/M2

## 2023-08-24 DIAGNOSIS — O02.1 MISSED ABORTION: Primary | ICD-10-CM

## 2023-08-24 LAB
GLUCOSE BLDC GLUCOMTR-MCNC: 100 MG/DL (ref 70–99)
HGB BLD-MCNC: 11.6 G/DL (ref 11.7–15.7)

## 2023-08-24 PROCEDURE — 85018 HEMOGLOBIN: CPT | Performed by: OBSTETRICS & GYNECOLOGY

## 2023-08-24 PROCEDURE — 82962 GLUCOSE BLOOD TEST: CPT

## 2023-08-24 PROCEDURE — 250N000011 HC RX IP 250 OP 636: Mod: JZ | Performed by: NURSE ANESTHETIST, CERTIFIED REGISTERED

## 2023-08-24 PROCEDURE — 999N000141 HC STATISTIC PRE-PROCEDURE NURSING ASSESSMENT: Performed by: OBSTETRICS & GYNECOLOGY

## 2023-08-24 PROCEDURE — 360N000075 HC SURGERY LEVEL 2, PER MIN: Performed by: OBSTETRICS & GYNECOLOGY

## 2023-08-24 PROCEDURE — 88305 TISSUE EXAM BY PATHOLOGIST: CPT | Mod: TC | Performed by: OBSTETRICS & GYNECOLOGY

## 2023-08-24 PROCEDURE — 710N000012 HC RECOVERY PHASE 2, PER MINUTE: Performed by: OBSTETRICS & GYNECOLOGY

## 2023-08-24 PROCEDURE — 370N000017 HC ANESTHESIA TECHNICAL FEE, PER MIN: Performed by: OBSTETRICS & GYNECOLOGY

## 2023-08-24 PROCEDURE — 36415 COLL VENOUS BLD VENIPUNCTURE: CPT | Performed by: OBSTETRICS & GYNECOLOGY

## 2023-08-24 PROCEDURE — 258N000003 HC RX IP 258 OP 636: Performed by: ANESTHESIOLOGY

## 2023-08-24 PROCEDURE — 250N000009 HC RX 250: Performed by: OBSTETRICS & GYNECOLOGY

## 2023-08-24 PROCEDURE — 250N000013 HC RX MED GY IP 250 OP 250 PS 637: Performed by: OBSTETRICS & GYNECOLOGY

## 2023-08-24 PROCEDURE — 272N000001 HC OR GENERAL SUPPLY STERILE: Performed by: OBSTETRICS & GYNECOLOGY

## 2023-08-24 RX ORDER — DEXAMETHASONE SODIUM PHOSPHATE 10 MG/ML
INJECTION, SOLUTION INTRAMUSCULAR; INTRAVENOUS PRN
Status: DISCONTINUED | OUTPATIENT
Start: 2023-08-24 | End: 2023-08-24

## 2023-08-24 RX ORDER — ACETAMINOPHEN 325 MG/1
975 TABLET ORAL ONCE
Status: COMPLETED | OUTPATIENT
Start: 2023-08-24 | End: 2023-08-24

## 2023-08-24 RX ORDER — LIDOCAINE HYDROCHLORIDE AND EPINEPHRINE 10; 10 MG/ML; UG/ML
INJECTION, SOLUTION INFILTRATION; PERINEURAL
Status: DISCONTINUED
Start: 2023-08-24 | End: 2023-08-24 | Stop reason: HOSPADM

## 2023-08-24 RX ORDER — ONDANSETRON 4 MG/1
4 TABLET, ORALLY DISINTEGRATING ORAL EVERY 30 MIN PRN
Status: CANCELLED | OUTPATIENT
Start: 2023-08-24

## 2023-08-24 RX ORDER — ONDANSETRON 4 MG/1
4 TABLET, ORALLY DISINTEGRATING ORAL EVERY 30 MIN PRN
Status: DISCONTINUED | OUTPATIENT
Start: 2023-08-24 | End: 2023-08-24 | Stop reason: HOSPADM

## 2023-08-24 RX ORDER — ACETAMINOPHEN 325 MG/1
975 TABLET ORAL EVERY 6 HOURS PRN
Qty: 50 TABLET | Refills: 0 | COMMUNITY
Start: 2023-08-24 | End: 2023-10-06

## 2023-08-24 RX ORDER — IBUPROFEN 400 MG/1
800 TABLET, FILM COATED ORAL ONCE
Status: CANCELLED | OUTPATIENT
Start: 2023-08-24 | End: 2023-08-24

## 2023-08-24 RX ORDER — ACETAMINOPHEN 325 MG/1
975 TABLET ORAL ONCE
Status: CANCELLED | OUTPATIENT
Start: 2023-08-24 | End: 2023-08-24

## 2023-08-24 RX ORDER — OXYCODONE HYDROCHLORIDE 5 MG/1
5 TABLET ORAL
Status: DISCONTINUED | OUTPATIENT
Start: 2023-08-24 | End: 2023-08-24 | Stop reason: HOSPADM

## 2023-08-24 RX ORDER — OXYCODONE HYDROCHLORIDE 5 MG/1
10 TABLET ORAL
Status: DISCONTINUED | OUTPATIENT
Start: 2023-08-24 | End: 2023-08-24 | Stop reason: HOSPADM

## 2023-08-24 RX ORDER — LIDOCAINE HYDROCHLORIDE AND EPINEPHRINE 10; 10 MG/ML; UG/ML
INJECTION, SOLUTION INFILTRATION; PERINEURAL PRN
Status: DISCONTINUED | OUTPATIENT
Start: 2023-08-24 | End: 2023-08-24 | Stop reason: HOSPADM

## 2023-08-24 RX ORDER — FENTANYL CITRATE 50 UG/ML
50 INJECTION, SOLUTION INTRAMUSCULAR; INTRAVENOUS EVERY 5 MIN PRN
Status: CANCELLED | OUTPATIENT
Start: 2023-08-24

## 2023-08-24 RX ORDER — IBUPROFEN 800 MG/1
800 TABLET, FILM COATED ORAL EVERY 6 HOURS PRN
Qty: 30 TABLET | Refills: 0 | COMMUNITY
Start: 2023-08-24 | End: 2023-10-06

## 2023-08-24 RX ORDER — KETOROLAC TROMETHAMINE 30 MG/ML
INJECTION, SOLUTION INTRAMUSCULAR; INTRAVENOUS PRN
Status: DISCONTINUED | OUTPATIENT
Start: 2023-08-24 | End: 2023-08-24

## 2023-08-24 RX ORDER — HYDROMORPHONE HCL IN WATER/PF 6 MG/30 ML
0.2 PATIENT CONTROLLED ANALGESIA SYRINGE INTRAVENOUS EVERY 5 MIN PRN
Status: CANCELLED | OUTPATIENT
Start: 2023-08-24

## 2023-08-24 RX ORDER — METHYLERGONOVINE MALEATE 0.2 MG/ML
INJECTION INTRAVENOUS
Status: DISCONTINUED
Start: 2023-08-24 | End: 2023-08-24 | Stop reason: HOSPADM

## 2023-08-24 RX ORDER — ONDANSETRON 2 MG/ML
INJECTION INTRAMUSCULAR; INTRAVENOUS PRN
Status: DISCONTINUED | OUTPATIENT
Start: 2023-08-24 | End: 2023-08-24

## 2023-08-24 RX ORDER — LABETALOL HYDROCHLORIDE 5 MG/ML
10 INJECTION, SOLUTION INTRAVENOUS
Status: CANCELLED | OUTPATIENT
Start: 2023-08-24

## 2023-08-24 RX ORDER — ONDANSETRON 2 MG/ML
4 INJECTION INTRAMUSCULAR; INTRAVENOUS EVERY 30 MIN PRN
Status: DISCONTINUED | OUTPATIENT
Start: 2023-08-24 | End: 2023-08-24 | Stop reason: HOSPADM

## 2023-08-24 RX ORDER — ONDANSETRON 2 MG/ML
4 INJECTION INTRAMUSCULAR; INTRAVENOUS EVERY 30 MIN PRN
Status: CANCELLED | OUTPATIENT
Start: 2023-08-24

## 2023-08-24 RX ORDER — HYDROMORPHONE HCL IN WATER/PF 6 MG/30 ML
0.4 PATIENT CONTROLLED ANALGESIA SYRINGE INTRAVENOUS EVERY 5 MIN PRN
Status: CANCELLED | OUTPATIENT
Start: 2023-08-24

## 2023-08-24 RX ORDER — PROPOFOL 10 MG/ML
INJECTION, EMULSION INTRAVENOUS PRN
Status: DISCONTINUED | OUTPATIENT
Start: 2023-08-24 | End: 2023-08-24

## 2023-08-24 RX ORDER — LIDOCAINE 40 MG/G
CREAM TOPICAL
Status: DISCONTINUED | OUTPATIENT
Start: 2023-08-24 | End: 2023-08-24 | Stop reason: HOSPADM

## 2023-08-24 RX ORDER — SODIUM CHLORIDE, SODIUM LACTATE, POTASSIUM CHLORIDE, CALCIUM CHLORIDE 600; 310; 30; 20 MG/100ML; MG/100ML; MG/100ML; MG/100ML
INJECTION, SOLUTION INTRAVENOUS CONTINUOUS
Status: DISCONTINUED | OUTPATIENT
Start: 2023-08-24 | End: 2023-08-24 | Stop reason: HOSPADM

## 2023-08-24 RX ORDER — FENTANYL CITRATE 50 UG/ML
INJECTION, SOLUTION INTRAMUSCULAR; INTRAVENOUS PRN
Status: DISCONTINUED | OUTPATIENT
Start: 2023-08-24 | End: 2023-08-24

## 2023-08-24 RX ORDER — SODIUM CHLORIDE, SODIUM LACTATE, POTASSIUM CHLORIDE, CALCIUM CHLORIDE 600; 310; 30; 20 MG/100ML; MG/100ML; MG/100ML; MG/100ML
INJECTION, SOLUTION INTRAVENOUS CONTINUOUS
Status: CANCELLED | OUTPATIENT
Start: 2023-08-24

## 2023-08-24 RX ORDER — PROPOFOL 10 MG/ML
INJECTION, EMULSION INTRAVENOUS CONTINUOUS PRN
Status: DISCONTINUED | OUTPATIENT
Start: 2023-08-24 | End: 2023-08-24

## 2023-08-24 RX ORDER — FENTANYL CITRATE 50 UG/ML
25 INJECTION, SOLUTION INTRAMUSCULAR; INTRAVENOUS EVERY 5 MIN PRN
Status: CANCELLED | OUTPATIENT
Start: 2023-08-24

## 2023-08-24 RX ORDER — DOXYCYCLINE 100 MG/1
200 CAPSULE ORAL ONCE
Status: COMPLETED | OUTPATIENT
Start: 2023-08-24 | End: 2023-08-24

## 2023-08-24 RX ADMIN — DOXYCYCLINE 200 MG: 100 CAPSULE ORAL at 13:21

## 2023-08-24 RX ADMIN — MIDAZOLAM 2 MG: 1 INJECTION INTRAMUSCULAR; INTRAVENOUS at 13:45

## 2023-08-24 RX ADMIN — ONDANSETRON 4 MG: 2 INJECTION INTRAMUSCULAR; INTRAVENOUS at 13:50

## 2023-08-24 RX ADMIN — PROPOFOL 50 MG: 10 INJECTION, EMULSION INTRAVENOUS at 13:52

## 2023-08-24 RX ADMIN — FENTANYL CITRATE 100 MCG: 50 INJECTION, SOLUTION INTRAMUSCULAR; INTRAVENOUS at 13:47

## 2023-08-24 RX ADMIN — ACETAMINOPHEN 975 MG: 325 TABLET ORAL at 13:21

## 2023-08-24 RX ADMIN — DEXAMETHASONE SODIUM PHOSPHATE 10 MG: 10 INJECTION, SOLUTION INTRAMUSCULAR; INTRAVENOUS at 13:50

## 2023-08-24 RX ADMIN — PROPOFOL 150 MCG/KG/MIN: 10 INJECTION, EMULSION INTRAVENOUS at 13:49

## 2023-08-24 RX ADMIN — SODIUM CHLORIDE, POTASSIUM CHLORIDE, SODIUM LACTATE AND CALCIUM CHLORIDE: 600; 310; 30; 20 INJECTION, SOLUTION INTRAVENOUS at 13:40

## 2023-08-24 RX ADMIN — KETOROLAC TROMETHAMINE 15 MG: 30 INJECTION, SOLUTION INTRAMUSCULAR at 14:12

## 2023-08-24 RX ADMIN — PROPOFOL 50 MG: 10 INJECTION, EMULSION INTRAVENOUS at 13:49

## 2023-08-24 ASSESSMENT — ACTIVITIES OF DAILY LIVING (ADL)
ADLS_ACUITY_SCORE: 35
ADLS_ACUITY_SCORE: 35

## 2023-08-24 NOTE — ANESTHESIA CARE TRANSFER NOTE
Patient: Melissa Albert    Procedure: Procedure(s):  SUCTION DILATION AND CURETTAGE       Diagnosis: Missed  [O02.1]  Diagnosis Additional Information: No value filed.    Anesthesia Type:   MAC     Note:    Oropharynx: oropharynx clear of all foreign objects and spontaneously breathing  Level of Consciousness: awake  Oxygen Supplementation: face mask  Level of Supplemental Oxygen (L/min / FiO2): 6  Independent Airway: airway patency satisfactory and stable  Dentition: dentition unchanged  Vital Signs Stable: post-procedure vital signs reviewed and stable  Report to RN Given: handoff report given  Patient transferred to: Phase II    Handoff Report: Identifed the Patient, Identified the Reponsible Provider, Reviewed the pertinent medical history, Discussed the surgical course, Reviewed Intra-OP anesthesia mangement and issues during anesthesia, Set expectations for post-procedure period and Allowed opportunity for questions and acknowledgement of understanding    Vitals:  Vitals Value Taken Time   /67 23 1418   Temp 36.2  C (97.1  F) 23 1418   Pulse     Resp 16 23 1418   SpO2 99 % 23 1418       Electronically Signed By: JAKE Tijerina CRNA  2023  2:20 PM

## 2023-08-24 NOTE — ANESTHESIA POSTPROCEDURE EVALUATION
Patient: Melissa Albert    Procedure: Procedure(s):  SUCTION DILATION AND CURETTAGE       Anesthesia Type:  MAC    Note:  Disposition: Outpatient   Postop Pain Control: Uneventful            Sign Out: Well controlled pain   PONV: No   Neuro/Psych: Uneventful            Sign Out: Acceptable/Baseline neuro status   Airway/Respiratory: Uneventful            Sign Out: Acceptable/Baseline resp. status   CV/Hemodynamics: Uneventful            Sign Out: Acceptable CV status; No obvious hypovolemia; No obvious fluid overload   Other NRE: NONE   DID A NON-ROUTINE EVENT OCCUR? No           Last vitals:  Vitals Value Taken Time   /72 08/24/23 1430   Temp 36.2  C (97.1  F) 08/24/23 1418   Pulse 87 08/24/23 1447   Resp 18 08/24/23 1430   SpO2 96 % 08/24/23 1447   Vitals shown include unvalidated device data.    Electronically Signed By: Tex Sullivan MD  August 24, 2023  4:32 PM

## 2023-08-24 NOTE — OP NOTE
[facility] Gynecology Operative Note  PROCEDURE NOTE - SUCTION D & C    NAME: Melissa Albert  : 1985  MRN: [id number]     DATE OF SERVICE: 2023     PREOPERATIVE DIAGNOSIS: Missed AB at 6 weeks gestation    POSTOPERATIVE DIAGNOSIS: Same    PROCEDURE: Suction dilatation and curettage    SURGEON: Shirley Lund MD    ANESTHESIA: MAC    ESTIMATED BLOOD LOSS: 5 ml     SPECIMENS: Uterine contents, sent to pathology    COMPLICATIONS: None.     HISTORY OF PRESENT ILLNESS:  This is a 38 year old female with a known missed . The risks, benefits and alternatives to the procedure were discussed with her at length.  She expressed understanding and wished to proceed.     PROCEDURE NOTE:  Patient was brought to the operating room and after induction of anesthesia was prepped and draped in the dorsal lithotomy position.  A time out was called and the patient and the procedure were verified.  A bimanual exam was done.  Uterus was noted to be boggy.   A sterile speculum was placed.  The anterior lip of the cervix was grasped with a single tooth tenaculum.  Yesica cervical dilators were used to dilate the cervix to a 7. Next a # 7 suction curette was induced and rotated to clear the uterus of all products of conception. No sharp curettage was done.  The tenaculum was removed from the cervix and hemostasis was made with silver nitrate and pressure. Sponge and instrument counts were correct. Patient tolerated the procedure well and was taken to the recovery room in good condition.      Shirley Lund MD

## 2023-08-24 NOTE — ANESTHESIA PREPROCEDURE EVALUATION
Anesthesia Pre-Procedure Evaluation    Patient: Melissa Albert   MRN: [id number] : 1985        Procedure : Procedure(s):  SUCTION DILATION AND CURETTAGE          Past Medical History:   Diagnosis Date    Depressive disorder     Diabetes (H)     Joint pain     Uncomplicated asthma       Past Surgical History:   Procedure Laterality Date     SECTION N/A 2022    Procedure:  SECTION;  Surgeon: Oliva Hollingsworth MD;  Location: [facility] L[facility]    CHOLECYSTECTOMY      LAPAROSCOPY DIAGNOSTIC (GYN)  2018    Procedure: LAPAROSCOPY DIAGNOSTIC (GYN);  Diagnostic Laparoscopy with conversion to laparotomy with removal of incarcerated fibroid;  Surgeon: Makayla Damian DO;  Location: [facility] OR    LAPAROTOMY EXPLORATORY N/A 2018    Procedure: LAPAROTOMY EXPLORATORY;;  Surgeon: Makayla Damian DO;  Location: [facility] OR    LUMBAR PUNCTURE FLUORO GUIDED DIAGNOSTIC  3/22/2019      Allergies   Allergen Reactions    Nitrates, Organic Other (See Comments)     Headache, nausea    Nsaids GI Disturbance      Social History     Tobacco Use    Smoking status: Never    Smokeless tobacco: Never   Substance Use Topics    Alcohol use: Not Currently      Wt Readings from Last 1 Encounters:   23 109.8 kg (242 lb)        Anesthesia Evaluation   Pt has had prior anesthetic.     No history of anesthetic complications       ROS/MED HX  ENT/Pulmonary:     (+)                     asthma                  Neurologic:  - neg neurologic ROS     Cardiovascular:  - neg cardiovascular ROS     METS/Exercise Tolerance:     Hematologic:  - neg hematologic  ROS     Musculoskeletal:  - neg musculoskeletal ROS     GI/Hepatic:  - neg GI/hepatic ROS     Renal/Genitourinary:  - neg Renal ROS     Endo:     (+)  type II DM,             Obesity,       Psychiatric/Substance Use:  - neg psychiatric ROS     Infectious Disease:  - neg infectious disease ROS     Malignancy:  - neg malignancy ROS     Other:  - neg other ROS           Physical Exam    Airway  airway exam normal      Mallampati: II   TM distance: > 3 FB   Neck ROM: full   Mouth opening: > 3 cm    Respiratory Devices and Support         Dental  no notable dental history         Cardiovascular   cardiovascular exam normal       Rhythm and rate: regular and normal     Pulmonary   pulmonary exam normal        breath sounds clear to auscultation           OUTSIDE LABS:  CBC:   Lab Results   Component Value Date    WBC 11.3 (H) 07/29/2022    WBC 9.6 07/06/2018    HGB 11.6 (L) 08/24/2023    HGB 9.8 (L) 08/03/2022    HCT 24.8 (L) 07/29/2022    HCT 20.3 (L) 07/06/2018     08/03/2022     08/02/2022     BMP:   Lab Results   Component Value Date     07/04/2018    POTASSIUM 4.5 07/04/2018    CHLORIDE 103 07/04/2018    CO2 29 07/04/2018    BUN 11 07/04/2018    CR 0.38 (L) 08/03/2022    CR 0.38 (L) 08/02/2022     (H) 08/24/2023     (H) 08/04/2022     COAGS:   Lab Results   Component Value Date    PTT 25 07/29/2022    INR 1.07 07/29/2022    FIBR 375 07/29/2022     POC:   Lab Results   Component Value Date    HCGS Negative 07/04/2018     HEPATIC:   Lab Results   Component Value Date    ALBUMIN 3.4 07/04/2018    PROTTOTAL 7.5 07/04/2018    ALT 17 08/03/2022    AST 14 08/03/2022    ALKPHOS 79 07/04/2018    BILITOTAL 0.2 07/04/2018     OTHER:   Lab Results   Component Value Date    HECTOR 8.9 07/04/2018    LIPASE 76 07/04/2018       Anesthesia Plan    ASA Status:  3    NPO Status:  NPO Appropriate    Anesthesia Type: MAC.     - Reason for MAC: immobility needed, straight local not clinically adequate              Consents    Anesthesia Plan(s) and associated risks, benefits, and realistic alternatives discussed. Questions answered and patient/representative(s) expressed understanding.     - Discussed:     - Discussed with:  Patient      - Extended Intubation/Ventilatory Support Discussed: No.      - Patient is DNR/DNI Status: No     Use of blood products  discussed: No .     Postoperative Care    Pain management: IV analgesics, Oral pain medications.   PONV prophylaxis: Ondansetron (or other 5HT-3), Dexamethasone or Solumedrol     Comments:                Tex Sullivan MD

## 2023-08-28 LAB
PATH REPORT.COMMENTS IMP SPEC: NORMAL
PATH REPORT.COMMENTS IMP SPEC: NORMAL
PATH REPORT.FINAL DX SPEC: NORMAL
PATH REPORT.GROSS SPEC: NORMAL
PATH REPORT.MICROSCOPIC SPEC OTHER STN: NORMAL
PATH REPORT.RELEVANT HX SPEC: NORMAL
PHOTO IMAGE: NORMAL

## 2023-08-28 PROCEDURE — 88305 TISSUE EXAM BY PATHOLOGIST: CPT | Mod: 26 | Performed by: PATHOLOGY

## 2023-09-30 ENCOUNTER — HEALTH MAINTENANCE LETTER (OUTPATIENT)
Age: 38
End: 2023-09-30

## 2023-10-06 ENCOUNTER — OFFICE VISIT (OUTPATIENT)
Dept: FAMILY MEDICINE | Facility: CLINIC | Age: 38
End: 2023-10-06
Payer: COMMERCIAL

## 2023-10-06 VITALS
BODY MASS INDEX: 40.68 KG/M2 | HEART RATE: 99 BPM | OXYGEN SATURATION: 96 % | HEIGHT: 63 IN | DIASTOLIC BLOOD PRESSURE: 96 MMHG | TEMPERATURE: 99.4 F | RESPIRATION RATE: 16 BRPM | WEIGHT: 229.6 LBS | SYSTOLIC BLOOD PRESSURE: 154 MMHG

## 2023-10-06 DIAGNOSIS — R76.8 POSITIVE ANTINUCLEAR ANTIBODY: ICD-10-CM

## 2023-10-06 DIAGNOSIS — M79.645 PAIN OF FINGER OF LEFT HAND: Primary | ICD-10-CM

## 2023-10-06 PROCEDURE — 99203 OFFICE O/P NEW LOW 30 MIN: CPT | Performed by: PHYSICIAN ASSISTANT

## 2023-10-06 RX ORDER — PROCHLORPERAZINE 25 MG/1
SUPPOSITORY RECTAL
COMMUNITY

## 2023-10-06 RX ORDER — DESOGESTREL AND ETHINYL ESTRADIOL 0.15-0.03
KIT ORAL
Status: ON HOLD | COMMUNITY
Start: 2023-10-01 | End: 2024-06-10

## 2023-10-06 RX ORDER — LEUPROLIDE ACETATE 1 MG/0.2ML
KIT SUBCUTANEOUS
Status: ON HOLD | COMMUNITY
Start: 2023-05-22 | End: 2024-06-10

## 2023-10-06 RX ORDER — ESTRADIOL 2 MG/1
TABLET ORAL
Status: ON HOLD | COMMUNITY
Start: 2023-07-20 | End: 2024-06-10

## 2023-10-06 RX ORDER — PROGESTERONE 50 MG/ML
INJECTION, SOLUTION INTRAMUSCULAR
Status: ON HOLD | COMMUNITY
Start: 2023-08-06 | End: 2024-06-10

## 2023-10-06 ASSESSMENT — PATIENT HEALTH QUESTIONNAIRE - PHQ9
SUM OF ALL RESPONSES TO PHQ QUESTIONS 1-9: 0
SUM OF ALL RESPONSES TO PHQ QUESTIONS 1-9: 0
10. IF YOU CHECKED OFF ANY PROBLEMS, HOW DIFFICULT HAVE THESE PROBLEMS MADE IT FOR YOU TO DO YOUR WORK, TAKE CARE OF THINGS AT HOME, OR GET ALONG WITH OTHER PEOPLE: NOT DIFFICULT AT ALL

## 2023-10-06 ASSESSMENT — ASTHMA QUESTIONNAIRES: ACT_TOTALSCORE: 25

## 2023-10-06 NOTE — PROGRESS NOTES
"  Assessment & Plan     Pain of finger of left hand  Positive antinuclear antibody  - Orthopedic  Referral; Future  Patient has ongoing left thumb pain and swelling, has history of +RAEANN and is off her hydroxychloroquine.  She has tried conservative measures and has had no relief.  Recommend evaluation by Ortho, referral placed. May benefit from labs and imaging.  Will hold off on steroids for accurate clinical representation at that visit.  Patient agreeable, will plan to see ortho.        BMI:   Estimated body mass index is 40.67 kg/m  as calculated from the following:    Height as of this encounter: 1.6 m (5' 3\").    Weight as of this encounter: 104.1 kg (229 lb 9.6 oz).   Weight management plan: Patient was referred to their PCP to discuss a diet and exercise plan.    Risks, benefits and alternatives were discussed with patient. Agreeable to the plan of care.        Jinny Montgomery PA-C  [facility]    Darnell Torres is a 38 year old, presenting for the following health issues:  Thumb Discomfort (Started in June with tenderness.  Stuck if it gets bumped.  Painful now. Swollen and very decreased ROM.  Mom dx with tenosynovitis in both of her thumbs with surgery.  Patient believes it is probably that.  OTC meds, Ice, brace, nothing seems to help. )      10/6/2023     2:23 PM   Additional Questions   Roomed by SESAR Wilson CMA([facility])       History of Present Illness       Reason for visit:  Thumb pain  Symptom onset:  More than a month  Symptoms include:  Clicking reduced movement  Symptom intensity:  Severe  Symptom progression:  Worsening  Had these symptoms before:  No  What makes it better:  Nothing    She eats 4 or more servings of fruits and vegetables daily.She consumes 0 sweetened beverage(s) daily.She exercises with enough effort to increase her heart rate 20 to 29 minutes per day.  She exercises with enough effort to increase her heart rate 3 or less days per " "week.   She is taking medications regularly.           Patient is here today for left thumb pain  Ongoing for a few months  Notes the area is swollen with limited mobility  No redness, no history of injury, no numbness or tingling  Has tried bracing, icing and ibuprofen without relief      Review of Systems   Constitutional, HEENT, cardiovascular, pulmonary, gi and gu systems are negative, except as otherwise noted.      Objective    BP (!) 145/97   Pulse 99   Temp 99.4  F (37.4  C) (Oral)   Resp 16   Ht 1.6 m (5' 3\")   Wt 104.1 kg (229 lb 9.6 oz)   LMP 09/16/2023 (Exact Date)   SpO2 96%   Breastfeeding No   BMI 40.67 kg/m    Body mass index is 40.67 kg/m .  Physical Exam   GENERAL: healthy, alert and no distress  NECK: no adenopathy, no asymmetry, masses, or scars and thyroid normal to palpation  RESP: lungs clear to auscultation - no rales, rhonchi or wheezes  CV: regular rate and rhythm, normal S1 S2, no S3 or S4, no murmur, click or rub, no peripheral edema and peripheral pulses strong  MS: left thumb: swollen, joints are tender to touch, decrease ROM and limited strength                      "

## 2023-10-20 NOTE — PROGRESS NOTES
ASSESSMENT & PLAN    Melissa was seen today for pain.    Diagnoses and all orders for this visit:    Trigger thumb of left hand  -     Orthopedic  Referral; Future  -     predniSONE (DELTASONE) 20 MG tablet; Take 2 tablets (40 mg) by mouth daily    Positive antinuclear antibody  -     Orthopedic  Referral    Pain of finger of left hand  -     Orthopedic  Referral    Chronic pain of left thumb  -     XR Hand Left G/E 3 Views; Future  -     Orthopedic  Referral; Future        38-year-old female presents with left thumb pain ongoing for several months without inciting injury or event.  She does report triggering and on exam today she has exquisite tenderness to palpation of the FPL tendon at the level of the A1 pulley.  She also has generalized thumb edema and decreased range of motion.  I suspect that this is primarily due to trigger thumb and tenosynovitis of the FPL.  We discussed possible treatment options for this, however she is about to go through IVF and would like to proceed with surgical treatment for this instead of trying conservative therapy.    Plan:  - We will obtain x-ray of the left thumb today given family history of rheumatoid arthritis, positive RAEANN, and joint swelling.  She has no other joint involvement, and no symmetric involvement, so my suspicion for RA is low at this time  - We will refer to hand surgery per patient request  - Discussed conservative treatment options for trigger thumb including corticosteroid injection, occupational therapy, splinting.  Patient does not want to potentially have her surgery delayed due to corticosteroid injection, so this was deferred today.  Provided with figure 8 splint to help prevent DIP flexion  - Patient does not tolerate NSAIDs well.  Will provide with 5-day course of prednisone to help with inflammation and swelling of her thumb  - Follow-up as needed or if patient would like to try an injection    Return if symptoms  "worsen or fail to improve.      Dr. Deepika Carrion, DO  [facility]  Sports Medicine     -----  Chief Complaint   Patient presents with    Left Thumb - Pain       SUBJECTIVE  Melissa Albert is a/an 38 year old right-handed female who is seen in consultation at the request of  Jinny Montgomery PA-C for evaluation of L thumb pain that has been ongoing for 6 months.  Onset was this summer and there hasn't been time to come in and be seen. Pain is located over the volar CMC joint and is reported to be sharp that is constant; 8/10 at rest and 10/10. Symptoms are worsened by direct contact will cause a \"locking\" of the DIP joint; has to be careful picking up her son and it makes her job challenging as she does a lot of typing.  She has tried OTC, icing, rest, bracing and nothing helps. Associated symptoms: denies. No other joint symptoms or swelling. Family history of RA.     The patient is seen with her child.     Prior injury/Surgical history of affected joint: bilateral CTS sx in 2013  Social History/Occupation: works from home; is on the computer all day;     REVIEW OF SYSTEMS:  Pertinent positives/negative: As stated above in HPI    OBJECTIVE:  Ht 1.6 m (5' 3\")   Wt 103.4 kg (228 lb)   LMP 09/16/2023 (Exact Date)   BMI 40.39 kg/m     General: Alert and in no distress  Skin: no visable rashes  CV: Extremities appear well perfused   Resp: normal respiratory effort, no conversational dyspnea   Psych: normal mood, affect  MSK:  Left thumb exam  Significant tenderness to palpation of the thumb flexor tendon at the level of the A1 pulley with reproduction of her pain, and I am able to palpate a nodule.  Edema of the thumb diffusely and limited range of motion secondary to swelling and pain.  Mild DIP and PIP tenderness, negative CMC grind.  Normal capillary refill.  Normal sensation.  Negative Finkelstein test.  No scaphoid tenderness.    RADIOLOGY:  Final results and " radiologist's interpretation available in the [facility] health record.  Images below were personally reviewed and discussed with the patient in the office today.  My personal interpretation of the performed imaging:   XR of the left hand reveals normal joint space alignment with no significant abnormalities.     Review of prior external note(s) from - primary care

## 2023-10-23 ENCOUNTER — ANCILLARY PROCEDURE (OUTPATIENT)
Dept: GENERAL RADIOLOGY | Facility: CLINIC | Age: 38
End: 2023-10-23
Attending: STUDENT IN AN ORGANIZED HEALTH CARE EDUCATION/TRAINING PROGRAM
Payer: COMMERCIAL

## 2023-10-23 ENCOUNTER — OFFICE VISIT (OUTPATIENT)
Dept: ORTHOPEDICS | Facility: CLINIC | Age: 38
End: 2023-10-23
Attending: PHYSICIAN ASSISTANT
Payer: COMMERCIAL

## 2023-10-23 VITALS — HEIGHT: 63 IN | WEIGHT: 228 LBS | BODY MASS INDEX: 40.4 KG/M2

## 2023-10-23 DIAGNOSIS — M79.645 PAIN OF FINGER OF LEFT HAND: ICD-10-CM

## 2023-10-23 DIAGNOSIS — M65.312 TRIGGER THUMB OF LEFT HAND: Primary | ICD-10-CM

## 2023-10-23 DIAGNOSIS — M79.645 CHRONIC PAIN OF LEFT THUMB: ICD-10-CM

## 2023-10-23 DIAGNOSIS — R76.8 POSITIVE ANTINUCLEAR ANTIBODY: ICD-10-CM

## 2023-10-23 DIAGNOSIS — G89.29 CHRONIC PAIN OF LEFT THUMB: ICD-10-CM

## 2023-10-23 PROCEDURE — 99204 OFFICE O/P NEW MOD 45 MIN: CPT | Performed by: STUDENT IN AN ORGANIZED HEALTH CARE EDUCATION/TRAINING PROGRAM

## 2023-10-23 PROCEDURE — 73130 X-RAY EXAM OF HAND: CPT | Mod: TC | Performed by: RADIOLOGY

## 2023-10-23 RX ORDER — PREDNISONE 20 MG/1
40 TABLET ORAL DAILY
Qty: 10 TABLET | Refills: 0 | Status: ON HOLD | OUTPATIENT
Start: 2023-10-23 | End: 2024-06-10

## 2023-10-23 ASSESSMENT — PAIN SCALES - GENERAL: PAINLEVEL: EXTREME PAIN (8)

## 2023-10-23 NOTE — LETTER
10/23/2023         RE: Melissa Albert  [address]  [place] [address]        Dear Colleague,    Thank you for referring your patient, Melissa Albert, to the [facility]. Please see a copy of my visit note below.    ASSESSMENT & PLAN    Melissa was seen today for pain.    Diagnoses and all orders for this visit:    Trigger thumb of left hand  -     Orthopedic  Referral; Future  -     predniSONE (DELTASONE) 20 MG tablet; Take 2 tablets (40 mg) by mouth daily    Positive antinuclear antibody  -     Orthopedic  Referral    Pain of finger of left hand  -     Orthopedic  Referral    Chronic pain of left thumb  -     XR Hand Left G/E 3 Views; Future  -     Orthopedic  Referral; Future        38-year-old female presents with left thumb pain ongoing for several months without inciting injury or event.  She does report triggering and on exam today she has exquisite tenderness to palpation of the FPL tendon at the level of the A1 pulley.  She also has generalized thumb edema and decreased range of motion.  I suspect that this is primarily due to trigger thumb and tenosynovitis of the FPL.  We discussed possible treatment options for this, however she is about to go through IVF and would like to proceed with surgical treatment for this instead of trying conservative therapy.    Plan:  - We will obtain x-ray of the left thumb today given family history of rheumatoid arthritis, positive RAEANN, and joint swelling.  She has no other joint involvement, and no symmetric involvement, so my suspicion for RA is low at this time  - We will refer to hand surgery per patient request  - Discussed conservative treatment options for trigger thumb including corticosteroid injection, occupational therapy, splinting.  Patient does not want to potentially have her surgery delayed due to corticosteroid injection, so this was deferred today.  Provided with figure 8  "splint to help prevent DIP flexion  - Patient does not tolerate NSAIDs well.  Will provide with 5-day course of prednisone to help with inflammation and swelling of her thumb  - Follow-up as needed or if patient would like to try an injection    Return if symptoms worsen or fail to improve.      Dr. Deepika Carrion, DO  [facility]  Sports Medicine     -----  Chief Complaint   Patient presents with     Left Thumb - Pain       SUBJECTIVE  Melissa Albert is a/an 38 year old right-handed female who is seen in consultation at the request of  Jinny Montgomery PA-C for evaluation of L thumb pain that has been ongoing for 6 months.  Onset was this summer and there hasn't been time to come in and be seen. Pain is located over the volar CMC joint and is reported to be sharp that is constant; 8/10 at rest and 10/10. Symptoms are worsened by direct contact will cause a \"locking\" of the DIP joint; has to be careful picking up her son and it makes her job challenging as she does a lot of typing.  She has tried OTC, icing, rest, bracing and nothing helps. Associated symptoms: denies. No other joint symptoms or swelling. Family history of RA.     The patient is seen with her child.     Prior injury/Surgical history of affected joint: bilateral CTS sx in 2013  Social History/Occupation: works from home; is on the computer all day;     REVIEW OF SYSTEMS:  Pertinent positives/negative: As stated above in HPI    OBJECTIVE:  Ht 1.6 m (5' 3\")   Wt 103.4 kg (228 lb)   LMP 09/16/2023 (Exact Date)   BMI 40.39 kg/m     General: Alert and in no distress  Skin: no visable rashes  CV: Extremities appear well perfused   Resp: normal respiratory effort, no conversational dyspnea   Psych: normal mood, affect  MSK:  Left thumb exam  Significant tenderness to palpation of the thumb flexor tendon at the level of the A1 pulley with reproduction of her pain, and I am able to palpate a nodule.  Edema of " the thumb diffusely and limited range of motion secondary to swelling and pain.  Mild DIP and PIP tenderness, negative CMC grind.  Normal capillary refill.  Normal sensation.  Negative Finkelstein test.  No scaphoid tenderness.    RADIOLOGY:  Final results and radiologist's interpretation available in the [facility] health record.  Images below were personally reviewed and discussed with the patient in the office today.  My personal interpretation of the performed imaging:   XR of the left hand reveals normal joint space alignment with no significant abnormalities.     Review of prior external note(s) from - primary care              Again, thank you for allowing me to participate in the care of your patient.        Sincerely,        Deepika Carrion, DO

## 2023-10-23 NOTE — PATIENT INSTRUCTIONS
Thank you for choosing [facility]!    DR. SUBRAMANIAN'S CLINIC LOCATIONS:     [place]  [employer] LINE: [phone] [address] [facility] [facility] APPOINTMENTS: [phone]   [place] [address] RADIOLOGY: [phone]   (Mondays & Tuesdays) HAND THERAPY: [phone]    PHYSICAL THERAPY: [phone]   [place] BILLING QUESTIONS: [phone] [address] FAX: [phone]   [place] [address]    (Thursdays & Fridays)

## 2023-11-02 NOTE — TELEPHONE ENCOUNTER
DIAGNOSIS: Chronic pain of left thumb [M79.645, G89.29]  Trigger thumb of left hand [    APPOINTMENT DATE: 11/13/23   NOTES STATUS DETAILS   OFFICE NOTE from referring provider Internal 10/23/23 - Deepika Carrion DO   OFFICE NOTE from other specialist Internal 10/06/23 - Jinny Montgomery PA-C    MEDICATION LIST Internal    XRAYS  & INJECTIONS (IMAGES & REPORTS) Internal  XR L HAND:  - 10/23/23

## 2023-11-13 ENCOUNTER — PRE VISIT (OUTPATIENT)
Dept: ORTHOPEDICS | Facility: CLINIC | Age: 38
End: 2023-11-13

## 2023-11-13 ENCOUNTER — OFFICE VISIT (OUTPATIENT)
Dept: ORTHOPEDICS | Facility: CLINIC | Age: 38
End: 2023-11-13
Attending: STUDENT IN AN ORGANIZED HEALTH CARE EDUCATION/TRAINING PROGRAM
Payer: COMMERCIAL

## 2023-11-13 DIAGNOSIS — G89.29 CHRONIC PAIN OF LEFT THUMB: ICD-10-CM

## 2023-11-13 DIAGNOSIS — M65.312 TRIGGER THUMB OF LEFT HAND: ICD-10-CM

## 2023-11-13 DIAGNOSIS — M79.645 CHRONIC PAIN OF LEFT THUMB: ICD-10-CM

## 2023-11-13 PROCEDURE — 99204 OFFICE O/P NEW MOD 45 MIN: CPT | Performed by: STUDENT IN AN ORGANIZED HEALTH CARE EDUCATION/TRAINING PROGRAM

## 2023-11-13 NOTE — PROGRESS NOTES
[facility] H&P Note:  Date: 2023    Patient Name: Melissa Albert  MRN: [id number]    Consult requested by: Deepika Carrion    CHIEF COMPLAINT: Left thumb pain/triggering    Occupation:  for clinical education      HPI:  Ms. Melissa Albert is a 38 year old female who presents with left trigger thumb. Worsening for 6 months. Now quite severe. Unable to fully flex. Locks in flexion which is very painful. Using an oval 8 splint. The patient is currently undergoing IVF treatment and has a transfer scheduled for early December.    History of DM2, last HbA1c 6.8 23      PAST MEDICAL HISTORY:  Past Medical History:   Diagnosis Date    Depressive disorder     Diabetes (H)     Joint pain     Uncomplicated asthma        PAST SURGICAL HISTORY:  Past Surgical History:   Procedure Laterality Date     SECTION N/A 2022    Procedure:  SECTION;  Surgeon: Oliva Hollingsworth MD;  [facility]: [facility]    CHOLECYSTECTOMY      DILATION AND CURETTAGE N/A 2023    Procedure: SUCTION DILATION AND CURETTAGE;  Surgeon: Shirley Lund MD;  Location: [facility] OR    LAPAROSCOPY DIAGNOSTIC (GYN)  2018    Procedure: LAPAROSCOPY DIAGNOSTIC (GYN);  Diagnostic Laparoscopy with conversion to laparotomy with removal of incarcerated fibroid;  Surgeon: Makayla Damian DO;  Location:  OR    LAPAROTOMY EXPLORATORY N/A 2018    Procedure: LAPAROTOMY EXPLORATORY;;  Surgeon: Makayla Damian DO;  Location: [facility] OR    LUMBAR PUNCTURE FLUORO GUIDED DIAGNOSTIC  3/22/2019       MEDICATIONS:  Current Outpatient Medications   Medication    Continuous Blood Gluc Transmit (DEXCOM G6 TRANSMITTER) MISC    estradiol (ESTRACE) 2 MG tablet    insulin glargine (LANTUS PEN) 100 UNIT/ML pen    ISIBLOOM 0.15-30 MG-MCG tablet    leuprolide acetate (LUPRON) 1 MG/0.2ML kit    predniSONE (DELTASONE) 20 MG tablet    Prenatal MV-Min-Fe Fum-FA-DHA (PRENATAL 1 PO)     progesterone, in sesame oil, 50 MG/ML injection     No current facility-administered medications for this visit.       ALLERGIES:     Allergies   Allergen Reactions    Nitrates, Organic Other (See Comments)     Headache, nausea    Nsaids GI Disturbance       FAMILY HISTORY:  No pertinent family history    SOCIAL HISTORY:  Social History     Tobacco Use    Smoking status: Never     Passive exposure: Never    Smokeless tobacco: Never   Vaping Use    Vaping Use: Never used   Substance Use Topics    Alcohol use: Not Currently    Drug use: No       The patient's past medical, family, and social history was reviewed and confirmed.    REVIEW OF SYMPTOMS:      General: Negative   Eyes: Negative   Ear, Nose and Throat: Negative   Respiratory: Negative   Cardiovascular: Negative   Gastrointestinal: Negative   Genito-urinary: Negative   Musculoskeletal: Negative  Neurological: Negative   Psychological: Negative  HEME: Negative   ENDO: Negative   SKIN: Negative    VITALS:  There were no vitals filed for this visit.    EXAM:  General: NAD, A&Ox3  HEENT: NC/AT  CV: RRR by peripheral pulse  Pulmonary: Non-labored breathing on RA  LUE:  Significant tenderness and palpable crepitus at the thumb A1 pulley  No triggering today, however patient has very limited active flexion at the IP joint  Intact EPL, FPL, hand intrinsic  Sensation is intact to light touch median, radial, ulnar nerve distributions  Warm well-perfused, capillary fill less than 2 seconds    LABS:  HbA1c 6.8 9/22/23     IMAGING:    XR left hand 10/23/23 without significant bony abnormality or degenerative change.  Mild asymmetric joint widening of the thumb CMC joint.    I have personally reviewed the above images and labs.         IMPRESSION AND RECOMMENDATIONS:  Ms. Melissa Albert is a 38 year old female with left trigger thumb.    The patient was referred to me as she is about to go through IVF treatment and would like to proceed with surgical management instead of  trying a steroid injection.    The indications for surgery were discussed with the patient. The benefits, risks, and alternatives of operative management were discussed in detail with the patient. The patient understands that the risks of surgery include, but are not limited to: infection, bleeding, injury to nearby structures (such as nerves, blood vessels, and tendons), persistent triggering, wound healing problems, need for additional surgery, pain, stiffness, scarring, need for rehabilitation, and anesthetic complications. Weight bearing restrictions postop discussed. Patient expressed understanding and elected to proceed with surgery. All questions were answered to the patient's satisfaction.    Case request placed, local only. I reassured the patient that this surgery can be done even if she is pregnant.      Tarik Peraza MD    Hand, Upper Extremity & Microvascular Surgery  Department of Orthopaedic Surgery  [facility]

## 2023-11-13 NOTE — LETTER
2023         RE: Melissa Albert  [address]  [place] [address]        Dear Colleague,    Thank you for referring your patient, Melissa Albert, to the [facility]. Please see a copy of my visit note below.    Orthopaedic Surgery Hand [facility]ity Clinic H&P Note:  Date: 2023    Patient Name: Melissa Albert  MRN: [id number]    Consult requested by: Deepika Carrion    CHIEF COMPLAINT: Left thumb pain/triggering    Occupation:  for clinical education      HPI:  Ms. Melissa Albert is a 38 year old female who presents with left trigger thumb. Worsening for 6 months. Now quite severe. Unable to fully flex. Locks in flexion which is very painful. Using an oval 8 splint. The patient is currently undergoing IVF treatment and has a transfer scheduled for early December.    History of DM2, last HbA1c 6.8 23      PAST MEDICAL HISTORY:  Past Medical History:   Diagnosis Date    Depressive disorder     Diabetes (H)     Joint pain     Uncomplicated asthma        PAST SURGICAL HISTORY:  Past Surgical History:   Procedure Laterality Date     SECTION N/A 2022    Procedure:  SECTION;  Surgeon: Oliva Hollingsworth MD;  [facility]    CHOLECYSTECTOMY      DILATION AND CURETTAGE N/A 2023    Procedure: SUCTION DILATION AND CURETTAGE;  Surgeon: Shirley Lund MD;  Location: [facility] OR    LAPAROSCOPY DIAGNOSTIC (GYN)  2018    Procedure: LAPAROSCOPY DIAGNOSTIC (GYN);  Diagnostic Laparoscopy with conversion to laparotomy with removal of incarcerated fibroid;  Surgeon: Makayla Damian DO;  Location:  OR    LAPAROTOMY EXPLORATORY N/A 2018    Procedure: LAPAROTOMY EXPLORATORY;;  Surgeon: Makayla Damian DO;  Location: [facility] OR    LUMBAR PUNCTURE FLUORO GUIDED DIAGNOSTIC  3/22/2019       MEDICATIONS:  Current Outpatient Medications   Medication    Continuous Blood Gluc Transmit (DEXCOM G6 TRANSMITTER) MISC     estradiol (ESTRACE) 2 MG tablet    insulin glargine (LANTUS PEN) 100 UNIT/ML pen    ISIBLOOM 0.15-30 MG-MCG tablet    leuprolide acetate (LUPRON) 1 MG/0.2ML kit    predniSONE (DELTASONE) 20 MG tablet    Prenatal MV-Min-Fe Fum-FA-DHA (PRENATAL 1 PO)    progesterone, in sesame oil, 50 MG/ML injection     No current facility-administered medications for this visit.       ALLERGIES:     Allergies   Allergen Reactions    Nitrates, Organic Other (See Comments)     Headache, nausea    Nsaids GI Disturbance       FAMILY HISTORY:  No pertinent family history    SOCIAL HISTORY:  Social History     Tobacco Use    Smoking status: Never     Passive exposure: Never    Smokeless tobacco: Never   Vaping Use    Vaping Use: Never used   Substance Use Topics    Alcohol use: Not Currently    Drug use: No       The patient's past medical, family, and social history was reviewed and confirmed.    REVIEW OF SYMPTOMS:      General: Negative   Eyes: Negative   Ear, Nose and Throat: Negative   Respiratory: Negative   Cardiovascular: Negative   Gastrointestinal: Negative   Genito-urinary: Negative   Musculoskeletal: Negative  Neurological: Negative   Psychological: Negative  HEME: Negative   ENDO: Negative   SKIN: Negative    VITALS:  There were no vitals filed for this visit.    EXAM:  General: NAD, A&Ox3  HEENT: NC/AT  CV: RRR by peripheral pulse  Pulmonary: Non-labored breathing on RA  LUE:  Significant tenderness and palpable crepitus at the thumb A1 pulley  No triggering today, however patient has very limited active flexion at the IP joint  Intact EPL, FPL, hand intrinsic  Sensation is intact to light touch median, radial, ulnar nerve distributions  Warm well-perfused, capillary fill less than 2 seconds    LABS:  HbA1c 6.8 9/22/23     IMAGING:    XR left hand 10/23/23 without significant bony abnormality or degenerative change.  Mild asymmetric joint widening of the thumb CMC joint.    I have personally reviewed the above images and  labs.         IMPRESSION AND RECOMMENDATIONS:  Ms. Melissa Albert is a 38 year old female with left trigger thumb.    The patient was referred to me as she is about to go through IVF treatment and would like to proceed with surgical management instead of trying a steroid injection.    The indications for surgery were discussed with the patient. The benefits, risks, and alternatives of operative management were discussed in detail with the patient. The patient understands that the risks of surgery include, but are not limited to: infection, bleeding, injury to nearby structures (such as nerves, blood vessels, and tendons), persistent triggering, wound healing problems, need for additional surgery, pain, stiffness, scarring, need for rehabilitation, and anesthetic complications. Weight bearing restrictions postop discussed. Patient expressed understanding and elected to proceed with surgery. All questions were answered to the patient's satisfaction.    Case request placed, local only. I reassured the patient that this surgery can be done even if she is pregnant.      Tarik Peraza MD    Hand, Upper Extremity & Microvascular Surgery  Department of Orthopaedic Surgery  [facility]

## 2023-11-13 NOTE — NURSING NOTE
Teaching Flowsheet   Relevant Diagnosis: Left trigger thumb  Teaching Topic: Left trigger thumb release    CSC under local anesthetic with Dr Tarik Peraza  DM Type 2, last HgbA1C 9-22-23 @ 6.8%  BMI 40.39  Going through IVF currently so hoping to have procedure soon.      Person(s) involved in teaching:   Patient     Motivation Level:  Asks Questions: Yes  Eager to Learn: Yes  Cooperative: Yes  Receptive (willing/able to accept information): Yes  Any cultural factors/Anabaptist beliefs that may influence understanding or compliance? No    Patient demonstrates understanding of the following:  Reason for the appointment, diagnosis and treatment plan: Yes  Knowledge of proper use of medications and conditions for which they are ordered (with special attention to potential side effects or drug interactions): Yes  Which situations necessitate calling provider and whom to contact: Yes    Teaching Concerns Addressed:   Proper use and care of  (medical equip, care aids, etc.): Yes  Nutritional needs and diet plan: Yes  Pain management techniques: Yes  Wound Care: Yes  How and/when to access community resources: Yes     Instructional Materials Used/Given: Preoperative surgery packet, antibacterial Chlorhexidine soap. Stop Light Tool reviewed, after-hours number provided, patient verbalized understanding, had no immediate questions. Evette Byrd RN

## 2023-11-13 NOTE — NURSING NOTE
Reason For Visit:   Chief Complaint   Patient presents with    Consult     Chronic pain of left hand and Left trigger thumb        Primary MD: Pablito Jacobo  Ref. MD: Meredith Carrion    Age: 38 year old    ?  No      LMP 09/16/2023 (Exact Date)       Pain Assessment  Patient Currently in Pain: Yes  0-10 Pain Scale: 8  Primary Pain Location: Finger (Comment which one) (Left thumb)  Pain Descriptors: Intermittent, Sharp, Constant    Hand Dominance Evaluation  Hand Dominance: Right          QuickDASH Assessment      11/13/2023     1:28 PM   QuickDASH Main   1. Open a tight or new jar Unable   2. Do heavy household chores (e.g., wash walls, floors) Severe difficulty   3. Carry a shopping bag or briefcase Severe difficulty   4. Wash your back Moderate difficulty   5. Use a knife to cut food No difficulty   6. Recreational activities in which you take some force or impact through your arm, shoulder or hand (e.g., golf, hammering, tennis, etc.) Severe difficulty   7. During the past week, to what extent has your arm, shoulder or hand problem interfered with your normal social activities with family, friends, neighbours or groups Quite a bit   8. During the past week, were you limited in your work or other regular daily activities as a result of your arm, shoulder or hand problem Very limited   9. Arm, shoulder or hand pain Severe   10.Tingling (pins and needles) in your arm,shoulder or hand Moderate   11. During the past week, how much difficulty have you had sleeping because of the pain in your arm, shoulder or hand Moderate difficulty   Quickdash Ability Score 63.64          Current Outpatient Medications   Medication Sig Dispense Refill    Continuous Blood Gluc Transmit (DEXCOM G6 TRANSMITTER) MISC Change every 3 months.      estradiol (ESTRACE) 2 MG tablet       insulin glargine (LANTUS PEN) 100 UNIT/ML pen Inject 42 Units Subcutaneous At Bedtime      ISIBLOOM 0.15-30 MG-MCG tablet       leuprolide  acetate (LUPRON) 1 MG/0.2ML kit       predniSONE (DELTASONE) 20 MG tablet Take 2 tablets (40 mg) by mouth daily 10 tablet 0    Prenatal MV-Min-Fe Fum-FA-DHA (PRENATAL 1 PO)       progesterone, in sesame oil, 50 MG/ML injection ADMINISTER 2 ML IN THE MUSCLE EVERY DAY AS DIRECTED         Allergies   Allergen Reactions    Nitrates, Organic Other (See Comments)     Headache, nausea    Nsaids GI Disturbance       NICOL VALLECILLO, ATC

## 2023-11-14 ENCOUNTER — TELEPHONE (OUTPATIENT)
Dept: ORTHOPEDICS | Facility: CLINIC | Age: 38
End: 2023-11-14

## 2023-11-14 NOTE — TELEPHONE ENCOUNTER
Patient has been scheduled for surgery. Details are below.    Date of Surgery: 01/18/24    Approximate Arrival Time: SURGERY CENTER WILL CALL 3/4 DAYS PRIOR TO CONFIRM A TIME   Surgeon:  DR. JC SHARPE     Procedure: RELEASE TRIGGER FINGER LEFT  Location: [facility]-[address] [address]  Surgery Consult: NA  PreOp Physical: NA  PostOp: 01/29/24  Packet Mailed/MyChart Sent: YES  Added to [place]: YES

## 2024-01-12 ENCOUNTER — MEDICAL CORRESPONDENCE (OUTPATIENT)
Dept: HEALTH INFORMATION MANAGEMENT | Facility: CLINIC | Age: 39
End: 2024-01-12
Payer: COMMERCIAL

## 2024-01-15 ENCOUNTER — TRANSCRIBE ORDERS (OUTPATIENT)
Dept: MATERNAL FETAL MEDICINE | Facility: HOSPITAL | Age: 39
End: 2024-01-15
Payer: COMMERCIAL

## 2024-01-15 DIAGNOSIS — O26.90 PREGNANCY RELATED CONDITION: Primary | ICD-10-CM

## 2024-01-18 ENCOUNTER — HOSPITAL ENCOUNTER (OUTPATIENT)
Facility: AMBULATORY SURGERY CENTER | Age: 39
Discharge: HOME | End: 2024-01-18
Attending: STUDENT IN AN ORGANIZED HEALTH CARE EDUCATION/TRAINING PROGRAM | Admitting: STUDENT IN AN ORGANIZED HEALTH CARE EDUCATION/TRAINING PROGRAM
Payer: COMMERCIAL

## 2024-01-18 VITALS
SYSTOLIC BLOOD PRESSURE: 141 MMHG | RESPIRATION RATE: 16 BRPM | OXYGEN SATURATION: 96 % | HEIGHT: 63 IN | TEMPERATURE: 97.3 F | HEART RATE: 96 BPM | DIASTOLIC BLOOD PRESSURE: 87 MMHG | BODY MASS INDEX: 38.98 KG/M2 | WEIGHT: 220 LBS

## 2024-01-18 DIAGNOSIS — M65.312 TRIGGER THUMB OF LEFT HAND: Primary | ICD-10-CM

## 2024-01-18 PROCEDURE — 26055 INCISE FINGER TENDON SHEATH: CPT | Mod: FA

## 2024-01-18 RX ORDER — ONDANSETRON 4 MG/1
4 TABLET, FILM COATED ORAL EVERY 8 HOURS PRN
Status: ON HOLD | COMMUNITY
End: 2024-08-02

## 2024-01-18 RX ORDER — LIDOCAINE HYDROCHLORIDE AND EPINEPHRINE 10; 10 MG/ML; UG/ML
10 INJECTION, SOLUTION INFILTRATION; PERINEURAL ONCE
Status: COMPLETED | OUTPATIENT
Start: 2024-01-18 | End: 2024-01-18

## 2024-01-18 RX ORDER — INSULIN LISPRO 100 [IU]/ML
1 INJECTION, SOLUTION INTRAVENOUS; SUBCUTANEOUS
Status: ON HOLD | COMMUNITY
Start: 2024-01-11 | End: 2024-08-05

## 2024-01-18 RX ORDER — DIAZEPAM 10 MG
10 TABLET ORAL ONCE
COMMUNITY
Start: 2023-09-30 | End: 2024-01-18

## 2024-01-18 RX ADMIN — LIDOCAINE HYDROCHLORIDE AND EPINEPHRINE 10 ML: 10; 10 INJECTION, SOLUTION INFILTRATION; PERINEURAL at 09:27

## 2024-01-18 NOTE — OP NOTE
Patient: Melissa Albert  : 1985  MRN: [id number]    DATE OF OPERATION: 2024      OPERATIVE REPORT       PREOPERATIVE DIAGNOSIS:  Left trigger thumb     POSTOPERATIVE DIAGNOSIS:  Left trigger thumb    PROCEDURE:  Open release of A1 pulley of the left thumb    SURGEON:  Tarik Peraza MD     ASSISTANT(S):  Clara Young MD    ANESTHESIA:  Local: 10cc 1% lidocaine 1:100,000 epinephrine     IMPLANTS:   None    ESTIMATED BLOOD LOSS:  1cc    DVT PROPHYLAXIS:  None    TOURNIQUET TIME:  None    SPECIMENS REMOVED:  None    INTRAOPERATIVE FINDINGS:  Tenosynovitis of the flexor tendon at the A1 pulley of left thumb    COMPLICATIONS:  None    DISPOSITION:  Stable to PACU.     INDICATIONS:  Ms. Melissa Albert is a 38 year old female with longstanding history of triggering of the left thumb recalcitrant to non-operative measures. The patient was seen in the office. Indications for surgery were discussed with the patient in detail.  The patient wished to proceed with trigger finger release.    The indications for surgery were discussed with the patient. The benefits, risks, and alternatives of operative management were discussed in detail with the patient. The patient understands that the risks of surgery include, but are not limited to: infection, bleeding, injury to nearby structures (such as nerves, blood vessels, and tendons), recurrence, persistent triggering, need for additional surgery, pain, stiffness, scarring, need for rehabilitation, and anesthetic complications.  Patient expressed understanding and elected to proceed with surgery. All questions were answered to the patient's satisfaction.    Consent was obtained for the procedure.     DESCRIPTION OF PROCEDURE IN DETAIL:  The patient was seen  in the preoperative care unit. Patient identity, consent, procedure to be performed, and operative site were verified with the patient. The left thumb was marked. The skin was cleansed with alcohol and 10cc of local  anesthetic mixture was injected into the subcutaneous tissues over the A1 pulley.    The patient was brought to the operating room and placed supine on the operating room table. The left upper extremity was placed on an armboard.     The left upper extremity was prepped and draped in the normal sterile fashion. A preoperative timeout was performed to identify the correct patient and procedure and all were in agreement.    Incision was first made on the volar aspect of left thumb in a transverse fashion in the palmar crease over the A1 pulley. Blunt dissection was taken down to the level of the flexor sheath, identifying and protecting the neurovascular bundle on either side of the sheath.    The A1 pulley in its entirety was visualized. This was sharply incised with a [place] blade, protecting the neurovascular bundle on either side and the tendon below. Proximally the A0 pulley was also divided under direct visualization. A complete release was performed and visualized. The tenosynovitis on the tendon was gently debrided with tenotomy scissors. A depression was seen in the flexor pollicis longus tendon at the site of constriction from the pulley.    The patient was asked to make a fist several times and no triggering was visualized of the finger. This wound was thoroughly irrigated. Careful hemostasis was assured with bipolar cautery. The surgical incision was closed with 4-0 monocryl in interrupted buried fashion.     Steri strips and a sterile soft dressing were placed.     All needle and sponge counts were correct at the end of the procedure. There were no complications.     The patient was taken to the postoperative recovery unit in stable condition.     I was present and scrubbed for the entire procedure.     POSTOPERATIVE PLAN:  The patient will maintain the postoperative dressing until the postoperative visit in 10-14 days. The dressing will be kept clean and dry. The patient will remain non-weightbearing on  the operative extremity. We have discussed and instructed the patient on range of motion exercises of the digits and wrist.     Tarik Peraza MD     Hand, Upper Extremity & Microvascular Surgery  Department of Orthopedic Surgery  [facility]

## 2024-01-18 NOTE — BRIEF OP NOTE
[facility]    Brief Operative Note    Pre-operative diagnosis: Trigger thumb of left hand [M65.312]  Post-operative diagnosis Same as pre-operative diagnosis    Procedure: RELEASE, LEFT TRIGGER THUMB, Left - Wrist    Surgeon: Surgeon(s) and Role:     * Tarik Peraza MD - Primary     * Clara Young MD - Resident - Assisting  Anesthesia: Local   Estimated Blood Loss: <1 mL     Specimens: * No specimens in log *  Findings:   See full op note.  Complications: None.  Implants: * No implants in log *    Discharge home today     Clara Young MD, PGY4  Orthopedic Surgery

## 2024-01-24 ENCOUNTER — TRANSFERRED RECORDS (OUTPATIENT)
Dept: HEALTH INFORMATION MANAGEMENT | Facility: CLINIC | Age: 39
End: 2024-01-24
Payer: COMMERCIAL

## 2024-01-29 ENCOUNTER — OFFICE VISIT (OUTPATIENT)
Dept: ORTHOPEDICS | Facility: CLINIC | Age: 39
End: 2024-01-29
Payer: COMMERCIAL

## 2024-01-29 DIAGNOSIS — M65.312 TRIGGER THUMB OF LEFT HAND: ICD-10-CM

## 2024-01-29 DIAGNOSIS — Z98.890 POST-OPERATIVE STATE: Primary | ICD-10-CM

## 2024-01-29 PROCEDURE — 99024 POSTOP FOLLOW-UP VISIT: CPT | Performed by: STUDENT IN AN ORGANIZED HEALTH CARE EDUCATION/TRAINING PROGRAM

## 2024-01-29 NOTE — NURSING NOTE
Reason For Visit:   Chief Complaint   Patient presents with    Surgical Followup     Post op left thumb A1 pulley release DOS 1/18/24       Primary MD: Pablito Jacobo  Ref. MD: braeden    Age: 38 year old    ?  No      LMP 09/16/2023 (Exact Date)       Pain Assessment  Patient Currently in Pain: Yes  Primary Pain Location: Finger (Comment which one) (left thumb)  Pain Descriptors: Itching, Discomfort, Nagging, Intermittent  Alleviating Factors: Ice, Exercise    Hand Dominance Evaluation  Hand Dominance: Right          QuickDASH Assessment      11/13/2023     1:28 PM   QuickDASH Main   1. Open a tight or new jar Unable   2. Do heavy household chores (e.g., wash walls, floors) Severe difficulty   3. Carry a shopping bag or briefcase Severe difficulty   4. Wash your back Moderate difficulty   5. Use a knife to cut food No difficulty   6. Recreational activities in which you take some force or impact through your arm, shoulder or hand (e.g., golf, hammering, tennis, etc.) Severe difficulty   7. During the past week, to what extent has your arm, shoulder or hand problem interfered with your normal social activities with family, friends, neighbours or groups Quite a bit   8. During the past week, were you limited in your work or other regular daily activities as a result of your arm, shoulder or hand problem Very limited   9. Arm, shoulder or hand pain Severe   10.Tingling (pins and needles) in your arm,shoulder or hand Moderate   11. During the past week, how much difficulty have you had sleeping because of the pain in your arm, shoulder or hand Moderate difficulty   Quickdash Ability Score 63.64          Current Outpatient Medications   Medication Sig Dispense Refill    Continuous Blood Gluc Transmit (DEXCOM G6 TRANSMITTER) MISC Change every 3 months.      estradiol (ESTRACE) 2 MG tablet       insulin glargine (LANTUS PEN) 100 UNIT/ML pen Inject 42 Units Subcutaneous At Bedtime      insulin lispro (HUMALOG  KWIKPEN) 100 UNIT/ML (1 unit dial) KWIKPEN Product desired: HUMALOG KWIKPEN  1/8 grams cho + 1/25 for finger stick glucose > 120.Product desired: HUMALOG KWIKPEN  1/8 grams cho + 1/25 for finger stick glucose > 120. Maximum daily dose is 20 units.      ISIBLOOM 0.15-30 MG-MCG tablet       leuprolide acetate (LUPRON) 1 MG/0.2ML kit       ondansetron (ZOFRAN) 4 MG tablet Take 4 mg by mouth every 8 hours as needed for nausea      predniSONE (DELTASONE) 20 MG tablet Take 2 tablets (40 mg) by mouth daily 10 tablet 0    Prenatal MV-Min-Fe Fum-FA-DHA (PRENATAL 1 PO)       progesterone, in sesame oil, 50 MG/ML injection ADMINISTER 2 ML IN THE MUSCLE EVERY DAY AS DIRECTED         Allergies   Allergen Reactions    Nitrates, Organic Other (See Comments)     Headache, nausea    Nsaids GI Disturbance       Faiza Vidales, ATC

## 2024-01-29 NOTE — LETTER
1/29/2024         RE: Melissa Albert  [address] [place] [address]        Dear Colleague,    Thank you for referring your patient, Melissa Albert, to the [facility] [place]. Please see a copy of my visit note below.    Date of Service: Jan 29, 2024    Chief Complaint: Post operative follow up.     Date of Surgery: 1/18/24    Procedure Performed: Open release of A1 pulley of the left thumb      Interval events: Melissa Albert is a 38 year old female who presents today for a postoperative follow up.  Doing well.  Pain with controlled with Tylenol first couple days.  Now minimal pain.  Denies numbness or tingling.  No lifting.  The past medical history was reviewed updated in the EMR. This includes medications, surgeries, social history, and review of systems.    Physical examination:    Well-developed, well-nourished and in no acute distress.  Alert and oriented to surroundings.  On examination of the  left thumb, incision with moderate maceration and superficial dehiscence.  No deep dehiscence.  No erythema or drainage. Swelling is Mild. Sensation is intact in median, radial and ulnar nerve distributions. Patient can actively flex and extend all digits and thumb. The patient is able to make a full composite fist. Fingers are warm and well-perfused.     Assessment: 38 year old female s/p Open release of A1 pulley of the left thumb, progressing appropriately.     Plan:  Patient should continue to wash wound with soap and water daily and pat dry. Steri-Strips will fall off on their own. No immersing the wound in water for prolonged periods such as tub baths or dishwashing without gloves until wound has healed. Do not lift anything heavier than a coffee cup or do forceful gripping with the operative hand until the wound has healed as this may split the wound open. This will typically take 1-2 more weeks. May use the hand for light activities like eating, shaving, typing, and brushing your  teeth. After the wound has completley healed, may progress activity gradually according to comfort level, but heavy lifting (> 10 pounds),  forceful gripping, and weight bearing directly on the palm (such as pushups) are discouraged for the first 6 weeks after surgery to give the area time to heal. Painful activities should be avoided. Follow up as needed.     ANA MARÍA AGUILAR PA-C  January 29, 2024 3:10 PM   Orthopaedic Surgery

## 2024-01-29 NOTE — PROGRESS NOTES
Date of Service: Jan 29, 2024    Chief Complaint: Post operative follow up.     Date of Surgery: 1/18/24    Procedure Performed: Open release of A1 pulley of the left thumb      Interval events: Melissa Albert is a 38 year old female who presents today for a postoperative follow up.  Doing well.  Pain with controlled with Tylenol first couple days.  Now minimal pain.  Denies numbness or tingling.  No lifting.  The past medical history was reviewed updated in the EMR. This includes medications, surgeries, social history, and review of systems.    Physical examination:    Well-developed, well-nourished and in no acute distress.  Alert and oriented to surroundings.  On examination of the  left thumb, incision with moderate maceration and superficial dehiscence.  No deep dehiscence.  No erythema or drainage. Swelling is Mild. Sensation is intact in median, radial and ulnar nerve distributions. Patient can actively flex and extend all digits and thumb. The patient is able to make a full composite fist. Fingers are warm and well-perfused.     Assessment: 38 year old female s/p Open release of A1 pulley of the left thumb, progressing appropriately.     Plan:  Patient should continue to wash wound with soap and water daily and pat dry. Steri-Strips will fall off on their own. No immersing the wound in water for prolonged periods such as tub baths or dishwashing without gloves until wound has healed. Do not lift anything heavier than a coffee cup or do forceful gripping with the operative hand until the wound has healed as this may split the wound open. This will typically take 1-2 more weeks. May use the hand for light activities like eating, shaving, typing, and brushing your teeth. After the wound has completley healed, may progress activity gradually according to comfort level, but heavy lifting (> 10 pounds),  forceful gripping, and weight bearing directly on the palm (such as pushups) are discouraged for the first 6 weeks  after surgery to give the area time to heal. Painful activities should be avoided. Follow up as needed.     ANA MARÍA AGUILAR PA-C  January 29, 2024 3:10 PM   Orthopaedic Surgery

## 2024-02-12 ENCOUNTER — LAB REQUISITION (OUTPATIENT)
Dept: LAB | Facility: CLINIC | Age: 39
End: 2024-02-12
Payer: COMMERCIAL

## 2024-02-12 DIAGNOSIS — O09.90 SUPERVISION OF HIGH RISK PREGNANCY, UNSPECIFIED, UNSPECIFIED TRIMESTER: ICD-10-CM

## 2024-02-12 DIAGNOSIS — Z87.59 PERSONAL HISTORY OF OTHER COMPLICATIONS OF PREGNANCY, CHILDBIRTH AND THE PUERPERIUM: ICD-10-CM

## 2024-02-12 DIAGNOSIS — Z11.3 ENCOUNTER FOR SCREENING FOR INFECTIONS WITH A PREDOMINANTLY SEXUAL MODE OF TRANSMISSION: ICD-10-CM

## 2024-02-12 LAB
ABO/RH(D): NORMAL
ALBUMIN MFR UR ELPH: 13.2 MG/DL
ALT SERPL W P-5'-P-CCNC: 13 U/L (ref 0–50)
ANTIBODY SCREEN: NEGATIVE
AST SERPL W P-5'-P-CCNC: 13 U/L (ref 0–45)
BASOPHILS # BLD AUTO: 0 10E3/UL (ref 0–0.2)
BASOPHILS NFR BLD AUTO: 0 %
CREAT SERPL-MCNC: 0.54 MG/DL (ref 0.51–0.95)
CREAT UR-MCNC: 97.7 MG/DL
EGFRCR SERPLBLD CKD-EPI 2021: >90 ML/MIN/1.73M2
EOSINOPHIL # BLD AUTO: 0.1 10E3/UL (ref 0–0.7)
EOSINOPHIL NFR BLD AUTO: 1 %
ERYTHROCYTE [DISTWIDTH] IN BLOOD BY AUTOMATED COUNT: 16 % (ref 10–15)
HBV SURFACE AG SERPL QL IA: NONREACTIVE
HCT VFR BLD AUTO: 35.9 % (ref 35–47)
HCV AB SERPL QL IA: NONREACTIVE
HGB BLD-MCNC: 11.3 G/DL (ref 11.7–15.7)
HIV 1+2 AB+HIV1 P24 AG SERPL QL IA: NONREACTIVE
IMM GRANULOCYTES # BLD: 0 10E3/UL
IMM GRANULOCYTES NFR BLD: 1 %
LYMPHOCYTES # BLD AUTO: 2.3 10E3/UL (ref 0.8–5.3)
LYMPHOCYTES NFR BLD AUTO: 29 %
MCH RBC QN AUTO: 27.1 PG (ref 26.5–33)
MCHC RBC AUTO-ENTMCNC: 31.5 G/DL (ref 31.5–36.5)
MCV RBC AUTO: 86 FL (ref 78–100)
MONOCYTES # BLD AUTO: 0.2 10E3/UL (ref 0–1.3)
MONOCYTES NFR BLD AUTO: 3 %
NEUTROPHILS # BLD AUTO: 5.2 10E3/UL (ref 1.6–8.3)
NEUTROPHILS NFR BLD AUTO: 66 %
NRBC # BLD AUTO: 0 10E3/UL
NRBC BLD AUTO-RTO: 0 /100
PLATELET # BLD AUTO: 427 10E3/UL (ref 150–450)
PROT/CREAT 24H UR: 0.14 MG/MG CR (ref 0–0.2)
RBC # BLD AUTO: 4.17 10E6/UL (ref 3.8–5.2)
SPECIMEN EXPIRATION DATE: NORMAL
WBC # BLD AUTO: 7.9 10E3/UL (ref 4–11)

## 2024-02-12 PROCEDURE — 87491 CHLMYD TRACH DNA AMP PROBE: CPT | Mod: ORL | Performed by: OBSTETRICS & GYNECOLOGY

## 2024-02-12 PROCEDURE — 84156 ASSAY OF PROTEIN URINE: CPT | Mod: ORL | Performed by: OBSTETRICS & GYNECOLOGY

## 2024-02-12 PROCEDURE — 80081 OBSTETRIC PANEL INC HIV TSTG: CPT | Mod: ORL | Performed by: OBSTETRICS & GYNECOLOGY

## 2024-02-12 PROCEDURE — 82565 ASSAY OF CREATININE: CPT | Mod: ORL | Performed by: OBSTETRICS & GYNECOLOGY

## 2024-02-12 PROCEDURE — 84460 ALANINE AMINO (ALT) (SGPT): CPT | Mod: ORL | Performed by: OBSTETRICS & GYNECOLOGY

## 2024-02-12 PROCEDURE — 86803 HEPATITIS C AB TEST: CPT | Mod: ORL | Performed by: OBSTETRICS & GYNECOLOGY

## 2024-02-12 PROCEDURE — 84450 TRANSFERASE (AST) (SGOT): CPT | Mod: ORL | Performed by: OBSTETRICS & GYNECOLOGY

## 2024-02-13 LAB
C TRACH DNA SPEC QL PROBE+SIG AMP: NEGATIVE
N GONORRHOEA DNA SPEC QL NAA+PROBE: NEGATIVE
RPR SER QL: NONREACTIVE
RUBV IGG SERPL QL IA: 5.11 INDEX
RUBV IGG SERPL QL IA: POSITIVE

## 2024-02-17 ENCOUNTER — HEALTH MAINTENANCE LETTER (OUTPATIENT)
Age: 39
End: 2024-02-17

## 2024-03-07 ENCOUNTER — TELEPHONE (OUTPATIENT)
Dept: FAMILY MEDICINE | Facility: CLINIC | Age: 39
End: 2024-03-07
Payer: COMMERCIAL

## 2024-03-07 NOTE — TELEPHONE ENCOUNTER
Patient Quality Outreach    Patient is due for the following:   Diabetes -  A1C, Eye Exam, Microalbumin, and Foot Exam  Asthma  -  ACT needed and AAP  Cervical Cancer Screening - PAP Needed  Depression  -  PHQ-9 needed and DAP  Physical Annual Wellness Visit      Topic Date Due    Hepatitis B Vaccine (2 of 3 - 19+ 3-dose series) 06/25/2021    COVID-19 Vaccine (5 - 2023-24 season) 09/01/2023       Next Steps:   Schedule a Adult Preventative    Type of outreach:    Sent Swapsee message.      Questions for provider review:    None           Nidhi Wilson MA

## 2024-03-11 ENCOUNTER — TRANSFERRED RECORDS (OUTPATIENT)
Dept: MULTI SPECIALTY CLINIC | Facility: CLINIC | Age: 39
End: 2024-03-11

## 2024-03-15 ENCOUNTER — LAB REQUISITION (OUTPATIENT)
Dept: LAB | Facility: CLINIC | Age: 39
End: 2024-03-15
Payer: COMMERCIAL

## 2024-03-15 DIAGNOSIS — O09.90 SUPERVISION OF HIGH RISK PREGNANCY, UNSPECIFIED, UNSPECIFIED TRIMESTER: ICD-10-CM

## 2024-03-15 PROCEDURE — 82105 ALPHA-FETOPROTEIN SERUM: CPT | Mod: ORL | Performed by: OBSTETRICS & GYNECOLOGY

## 2024-03-18 LAB
# FETUSES US: NORMAL
AFP MOM SERPL: 1.31
AFP SERPL-MCNC: 29 NG/ML
AGE - REPORTED: 39.3 YR
CURRENT SMOKER: NO
FAMILY MEMBER DISEASES HX: NO
GA METHOD: NORMAL
GA: NORMAL WK
IDDM PATIENT QL: YES
INTEGRATED SCN PATIENT-IMP: NORMAL
SPECIMEN DRAWN SERPL: NORMAL

## 2024-03-26 ENCOUNTER — PRE VISIT (OUTPATIENT)
Dept: MATERNAL FETAL MEDICINE | Facility: HOSPITAL | Age: 39
End: 2024-03-26
Payer: COMMERCIAL

## 2024-03-26 ENCOUNTER — TRANSCRIBE ORDERS (OUTPATIENT)
Dept: MATERNAL FETAL MEDICINE | Facility: HOSPITAL | Age: 39
End: 2024-03-26
Payer: COMMERCIAL

## 2024-03-26 DIAGNOSIS — O26.90 PREGNANCY RELATED CONDITION: Primary | ICD-10-CM

## 2024-03-29 ENCOUNTER — OFFICE VISIT (OUTPATIENT)
Dept: MATERNAL FETAL MEDICINE | Facility: HOSPITAL | Age: 39
End: 2024-03-29
Attending: STUDENT IN AN ORGANIZED HEALTH CARE EDUCATION/TRAINING PROGRAM
Payer: COMMERCIAL

## 2024-03-29 ENCOUNTER — ANCILLARY PROCEDURE (OUTPATIENT)
Dept: ULTRASOUND IMAGING | Facility: HOSPITAL | Age: 39
End: 2024-03-29
Attending: STUDENT IN AN ORGANIZED HEALTH CARE EDUCATION/TRAINING PROGRAM
Payer: COMMERCIAL

## 2024-03-29 DIAGNOSIS — Z31.83 IN VITRO FERTILIZATION: ICD-10-CM

## 2024-03-29 DIAGNOSIS — Z36.89 ENCOUNTER FOR FETAL ANATOMIC SURVEY: ICD-10-CM

## 2024-03-29 DIAGNOSIS — O26.90 PREGNANCY RELATED CONDITION: ICD-10-CM

## 2024-03-29 DIAGNOSIS — O09.522 MULTIGRAVIDA OF ADVANCED MATERNAL AGE IN SECOND TRIMESTER: ICD-10-CM

## 2024-03-29 DIAGNOSIS — O09.522 MULTIGRAVIDA OF ADVANCED MATERNAL AGE IN SECOND TRIMESTER: Primary | ICD-10-CM

## 2024-03-29 DIAGNOSIS — O99.210 OBESITY IN PREGNANCY: ICD-10-CM

## 2024-03-29 DIAGNOSIS — O26.92 PREGNANCY RELATED CONDITION IN SECOND TRIMESTER: ICD-10-CM

## 2024-03-29 DIAGNOSIS — O09.812 PREGNANCY RESULTING FROM IN VITRO FERTILIZATION IN SECOND TRIMESTER: ICD-10-CM

## 2024-03-29 DIAGNOSIS — O24.113 TYPE 2 DIABETES MELLITUS COMPLICATING PREGNANCY IN THIRD TRIMESTER, ANTEPARTUM: Primary | ICD-10-CM

## 2024-03-29 PROCEDURE — 96040 HC GENETIC COUNSELING, EACH 30 MINUTES: CPT

## 2024-03-29 PROCEDURE — 76811 OB US DETAILED SNGL FETUS: CPT | Mod: 26 | Performed by: STUDENT IN AN ORGANIZED HEALTH CARE EDUCATION/TRAINING PROGRAM

## 2024-03-29 PROCEDURE — 99213 OFFICE O/P EST LOW 20 MIN: CPT | Mod: 25 | Performed by: STUDENT IN AN ORGANIZED HEALTH CARE EDUCATION/TRAINING PROGRAM

## 2024-03-29 PROCEDURE — 76811 OB US DETAILED SNGL FETUS: CPT

## 2024-03-29 NOTE — PROGRESS NOTES
[facility]  Genetic Counseling Consult    Patient:  Melissa Albert YOB: 1985   Date of Service:  3/29/24   MRN: [id number]    Melissa was seen at the [facility] [place] for genetic consultation. The indication for genetic counseling is advanced maternal age. The patient was accompanied to this visit by their partner Zeb and son Hang.     The session was conducted in English.        IMPRESSION/ PLAN   1. Melissa had preimplantation genetic screening on embryos, the result of which was normal for the embryo transferred in this pregnancy. Melissa has opted to decline prenatal genetic screening in this pregnancy.     2. During today's [facility] visit, Melissa had a genetic counseling session only. Additional screening and diagnostic testing was discussed for the gestational age and declined.     3. Melissa had a level II comprehensive anatomy ultrasound today. Please see the ultrasound report for further details.    4. Further recommendations include a fetal echocardiogram with Pediatric Cardiology. The upcoming ultrasound has been scheduled for 2024.    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   This pregnancy was conceived with in vitro fertilization (IVF). The following was discussed:   Embryo transfer date: 2024  Number of transferred embryos: 1  Use of egg or sperm donor: None  Age of egg retrieval: 36  Frozen 6 day transfer   Preimplantation genetic testing (PGT) was performed on the embryos and the results were normal (per patient report,  "results not available for review) and consistent with male embryo.   PGT-A is a screening test. Therefore, a normal PGT-A result significantly reduces but does not eliminate the possibility of aneuploidy. Invasive testing (such as amniocentesis) is recommended if the patient desires definitive information regarding aneuploidy in pregnancy. We discussed the limitation of NIPT following PGT-A and that pursuing multiple screening tests may result in conflicting information in which case the option of invasive testing would be reviewed again.  Fetal echocardiogram will be recommended and scheduled after the 18-20 week fetal anatomy ultrasound  The average pregnancy has a 0.5-1.0% chance of a congenital heart defect. However, studies suggest an increased chance for a heart defect for IVF pregnancies, with estimates ranging from 1-3.3%. Fetal echocardiograms are typically performed at 20 to 24 weeks gestation.    MEDICAL HISTORY   Melissa s medical history is significant for type II diabetes. Pregestational diabetes mellitus (PGDM), both type I and type II, is associated with an increased risk of major birth defects over the general population risk. Comprehensive level II ultrasound and fetal echocardiogram are recommended. Please see ultrasound report for additional recommendations.       FAMILY HISTORY   A three-generation pedigree was obtained previously by a genetic counselor on 2/11/2022 and updated today. The original and updated version is scanned under the \"Media\" tab in Epic.  The family history was reported by Melissa and their partner.    The following significant findings were reported today:   Livan's son, Hang, is 1.5 years old. He was identified to have Luis Zbigniew sequence prenatally. He was in the NICU for 4 weeks following delivery and during that time pediatric genetics was consulted and recommended no further testing or genetics follow-up per patient report. Hang has done well and has been " otherwise healthy and meeting developmental milestones.   Luis Zbigniew sequence is a set of congenital abnormalities which includes micrognathia, glossoptosis, upper airway obstruction, and cleft palate. Luis Zbigniew sequence can occur as part of a genetic syndrome (for example, Stickler syndrome, campomelic dysplasia, or 22q11.2 deletion) or can be isolated. In most cases of isolated Luis-Zbigniew sequence, recurrence risk for siblings is low.   Without a known genetic cause in Hang, prenatal genetic testing is not available. We discussed the role of comprehensive ultrasound in screening for Luis Zbigniew sequence. Also reviewed limitations of prenatal ultrasound and discussed that some cases of Luis Zbigniew sequence and cleft palate may not be evident until a physical exam is performed after birth.     Zeb's father was born with fused kidneys. This was discovered incidentally in adulthood and caused no symptoms.     Otherwise, the reported family history is unremarkable for multiple miscarriages, stillbirths, birth defects, intellectual disabilities, known genetic conditions, and consanguinity.       RISK ASSESSMENT FOR INHERITED CONDITIONS AND CARRIER SCREENING OPTIONS   Expanded carrier screening is available to screen for autosomal recessive conditions and X-linked conditions in a large list of genes. Carrier screening does not test the pregnancy but gives a risk assessment for the pregnancy and future pregnancies to have the condition. Expanded carrier screening is designed to identify carrier status for conditions that are primarily childhood or adolescent onset. Expanded carrier screening does not evaluate for adult-onset conditions such as hereditary cancer syndromes, dementia/ Alzheimer's disease, or cardiovascular disease risk factors. Additionally, expanded carrier screening is not comprehensive for all known genetic diseases or inherited conditions. Carrier screening does not test for all genetic  and health conditions or risk factors. It does not intentionally screen for autosomal dominant conditions. [place] screening was not reviewed due to focus on other topics.  About [place] Screening    Autosomal recessive conditions happen when a mutation has been inherited from the egg and sperm and include conditions like cystic fibrosis, thalassemia, hearing loss, spinal muscular atrophy, and more. We reviewed that when both biological parents carry a harmful genetic change in a gene associated with autosomal recessive inheritance, each of their pregnancies has a 1 in 4 (25%) chance to be affected by that condition. X-linked conditions happen when a mutation has been inherited from the egg and include conditions like fragile X syndrome.With x-linked conditions, the specific risk generally depends on the chromosomal sex of the fetus, with XY individuals (generally male) being most severely affected.     The patient does NOT have a family history of known inherited conditions. This does NOT mean the patient and/or their partner is not a carrier of a condition. Approximately 90% of couples at an increased reproductive risk for an inherited condition have no family history of that condition.     Melissa has had previous carrier screening through [employer], the results of which were negative for 14 conditions.  A copy of the report was not available for our review today. Zeb has not had carrier screening.     The option for additional expanded carrier screening was reviewed and declined today.        RISK ASSESSMENT FOR CHROMOSOME CONDITIONS   We explained that the risk for fetal chromosome abnormalities increases with maternal age. We discussed specific features of common chromosome abnormalities, including Down syndrome, trisomy 13, trisomy 18, and sex chromosome trisomies.    Melissa was 36 at time of egg retrieval:  At age 36 at midtrimester, the risk to have a baby with Down syndrome is 1 in 216.   At age 36 at  midtrimester, the risk to have a baby with any chromosome abnormality is 1 in 105.     Melissa had PGT-A on embryos and the result for the embryo transferred in the current pregnancy was consistent with normal male result. This significantly decreases the chance of chromosome abnormalities in the current pregnancy. Melissa opted to decline prenatal genetic screening or diagnostic testing in this pregnancy and declines these options today.     GENETIC TESTING OPTIONS   Genetic testing during a pregnancy includes screening and diagnostic procedures.      Screening tests are non-invasive which means no risk to the pregnancy and includes ultrasounds and blood work. The benefits and limitations of screening were reviewed. Screening tests provide a risk assessment (chance) specific to the pregnancy for certain fetal chromosome abnormalities but cannot definitively diagnose or exclude a fetal chromosome abnormality. Follow-up genetic counseling and consideration of diagnostic testing is recommended with any abnormal screening result. Diagnostic testing during a pregnancy is more certain and can test for more conditions. However, the tests do have a risk of miscarriage that requires careful consideration. These tests can detect fetal chromosome abnormalities with greater than 99% certainty. Results can be compromised by maternal cell contamination or mosaicism and are limited by the resolution of current genetic testing technology.     There is no screening or diagnostic test that detects all forms of birth defects or intellectual disability.     We discussed the following screening options:   Non-invasive prenatal testing (NIPT)  Also called cell-free DNA screening because it detects chromosomes from the placenta in the pregnant person's blood  Can be done any time after 10 weeks gestation  Standard recommendation for NIPT screens for trisomy 21, trisomy 18, trisomy 13, with the option of adding sex chromosome aneuploidies and  predicted sex  Current (2022) ACMG guidelines do recommend that screening for one microdeletion syndrome, called 22q11.2 deletion syndrome be offered to all pregnant patients.  Newer NIPT options are also available that include screening for other rare autosomal trisomies, microdeletion/duplication syndromes, certain single-gene autosomal recessive and autosomal dominant conditions, and fetal blood antigens. Guidelines do not recommend these conditions are included in standard screening. These options have limitations and should be discussed with a genetic counselor.   Cannot screen for open neural tube defects, maternal serum AFP after 15 weeks is recommended    Carrier screening  Risk assessment for certain autosomal recessive and x-linked conditions. These conditions are generally infantile- or childhood-onset conditions.   Can be done any time during the pregnancy or prior to pregnancy.  Can screen for over 400 different genetic conditions.  Is not intended to diagnosis a condition in the carrier parent.    Even with negative results, a residual risk for screened conditions remains.     We discussed the following ultrasound options:  Comprehensive level II ultrasound (Fetal Anatomy Ultrasound)  Ultrasound done between 18-20 weeks gestation  Screens for major birth defects and markers for aneuploidy (like trisomy 21 and trisomy 18)  Includes assessment of the fetal growth, internal organs, placenta, and amniotic fluid    Fetal Echocardiogram  Ultrasound done between 22-24 weeks gestation  Screen for heart defects  Recommended if there are concerns about the heart or other indications like IVF pregnancy or maternal diabetes    We discussed the following diagnostic options:   Amniocentesis  Invasive diagnostic procedure done after 15 weeks gestation  The procedure collects a small sample of amniotic fluid for the purpose of chromosomal testing and/or other genetic testing  Diagnostic result; more than 99%  sensitivity for fetal chromosome abnormalities  Testing for AFP in the amniotic fluid can test for open neural tube defects      It was a pleasure to be involved with Melissa s care. Face-to-face time of the meeting was 20 minutes.    Makayla Avila [facility], [facility]  Certified and [place] Licensed Genetic Counselor  [facility]  Maternal Fetal Medicine  Office: [phone]  M: [phone]   Fax: [phone]  [employer]

## 2024-03-29 NOTE — PROGRESS NOTES
Melissa Albert is a  at 19w0d who presents to [facility] for GC and L2 ultrasound. Pt reports positive fetal movement. Pt denies bldg/lof/change in discharge, contractions, headache, vision changes, chest pain/SOB or edema. SBAR given to Dr. SAYRA Retana, see note in Epic.

## 2024-03-29 NOTE — PROGRESS NOTES
Please see the full imaging report from the [employer] program under the imaging tab.    Tatiana Retana MD  Maternal Fetal Medicine

## 2024-04-12 RX ORDER — CEFAZOLIN SODIUM/WATER 2 G/20 ML
2 SYRINGE (ML) INTRAVENOUS SEE ADMIN INSTRUCTIONS
Status: CANCELLED | OUTPATIENT
Start: 2024-04-12

## 2024-04-12 RX ORDER — OXYTOCIN 10 [USP'U]/ML
10 INJECTION, SOLUTION INTRAMUSCULAR; INTRAVENOUS
Status: CANCELLED | OUTPATIENT
Start: 2024-04-12

## 2024-04-12 RX ORDER — METHYLERGONOVINE MALEATE 0.2 MG/ML
200 INJECTION INTRAVENOUS
Status: CANCELLED | OUTPATIENT
Start: 2024-04-12

## 2024-04-12 RX ORDER — OXYTOCIN/0.9 % SODIUM CHLORIDE 30/500 ML
340 PLASTIC BAG, INJECTION (ML) INTRAVENOUS CONTINUOUS PRN
Status: CANCELLED | OUTPATIENT
Start: 2024-04-12

## 2024-04-12 RX ORDER — MISOPROSTOL 200 UG/1
400 TABLET ORAL
Status: CANCELLED | OUTPATIENT
Start: 2024-04-12

## 2024-04-12 RX ORDER — LIDOCAINE 40 MG/G
CREAM TOPICAL
Status: CANCELLED | OUTPATIENT
Start: 2024-04-12

## 2024-04-12 RX ORDER — CITRIC ACID/SODIUM CITRATE 334-500MG
30 SOLUTION, ORAL ORAL
Status: CANCELLED | OUTPATIENT
Start: 2024-04-12

## 2024-04-12 RX ORDER — TRANEXAMIC ACID 10 MG/ML
1 INJECTION, SOLUTION INTRAVENOUS EVERY 30 MIN PRN
Status: CANCELLED | OUTPATIENT
Start: 2024-04-12

## 2024-04-12 RX ORDER — ACETAMINOPHEN 325 MG/1
975 TABLET ORAL ONCE
Status: CANCELLED | OUTPATIENT
Start: 2024-04-12 | End: 2024-04-12

## 2024-04-12 RX ORDER — SODIUM CHLORIDE, SODIUM LACTATE, POTASSIUM CHLORIDE, CALCIUM CHLORIDE 600; 310; 30; 20 MG/100ML; MG/100ML; MG/100ML; MG/100ML
INJECTION, SOLUTION INTRAVENOUS CONTINUOUS
Status: CANCELLED | OUTPATIENT
Start: 2024-04-12

## 2024-04-12 RX ORDER — LOPERAMIDE HCL 2 MG
4 CAPSULE ORAL
Status: CANCELLED | OUTPATIENT
Start: 2024-04-12

## 2024-04-12 RX ORDER — CARBOPROST TROMETHAMINE 250 UG/ML
250 INJECTION, SOLUTION INTRAMUSCULAR
Status: CANCELLED | OUTPATIENT
Start: 2024-04-12

## 2024-04-12 RX ORDER — CEFAZOLIN SODIUM/WATER 2 G/20 ML
2 SYRINGE (ML) INTRAVENOUS
Status: CANCELLED | OUTPATIENT
Start: 2024-04-12

## 2024-04-12 RX ORDER — LOPERAMIDE HCL 2 MG
2 CAPSULE ORAL
Status: CANCELLED | OUTPATIENT
Start: 2024-04-12

## 2024-04-12 RX ORDER — OXYTOCIN/0.9 % SODIUM CHLORIDE 30/500 ML
100-340 PLASTIC BAG, INJECTION (ML) INTRAVENOUS CONTINUOUS PRN
Status: CANCELLED | OUTPATIENT
Start: 2024-04-12

## 2024-04-12 RX ORDER — MISOPROSTOL 200 UG/1
800 TABLET ORAL
Status: CANCELLED | OUTPATIENT
Start: 2024-04-12

## 2024-04-19 ENCOUNTER — HOSPITAL ENCOUNTER (OUTPATIENT)
Dept: CARDIOLOGY | Facility: CLINIC | Age: 39
Discharge: HOME | End: 2024-04-19
Attending: NURSE PRACTITIONER | Admitting: NURSE PRACTITIONER
Payer: COMMERCIAL

## 2024-04-19 ENCOUNTER — OFFICE VISIT (OUTPATIENT)
Dept: PEDIATRIC CARDIOLOGY | Facility: CLINIC | Age: 39
End: 2024-04-19
Payer: COMMERCIAL

## 2024-04-19 DIAGNOSIS — O35.BXX0 ANOMALY OF HEART OF FETUS AFFECTING PREGNANCY, ANTEPARTUM, SINGLE OR UNSPECIFIED FETUS: Primary | ICD-10-CM

## 2024-04-19 DIAGNOSIS — O26.90 PREGNANCY RELATED CONDITION: ICD-10-CM

## 2024-04-19 PROCEDURE — 93325 DOPPLER ECHO COLOR FLOW MAPG: CPT | Mod: 26 | Performed by: PEDIATRICS

## 2024-04-19 PROCEDURE — 93325 DOPPLER ECHO COLOR FLOW MAPG: CPT

## 2024-04-19 PROCEDURE — 99213 OFFICE O/P EST LOW 20 MIN: CPT | Mod: 25 | Performed by: PEDIATRICS

## 2024-04-19 PROCEDURE — 76825 ECHO EXAM OF FETAL HEART: CPT | Mod: 26 | Performed by: PEDIATRICS

## 2024-04-19 PROCEDURE — 76827 ECHO EXAM OF FETAL HEART: CPT | Mod: 26 | Performed by: PEDIATRICS

## 2024-04-19 NOTE — LETTER
2024      RE: Melissa Albert  [address]  [place] [address]     Dear Colleague,    Thank you for the opportunity to participate in the care of your patient, Melissa Albert, at the [facility] at [facility]. Please see a copy of my visit note below.    [facility]   Heart Center Fetal Consult Note    Patient:  Melissa Albert MRN:  [id number]   YOB: 1985 Age:  38 year old   Date of Visit:  2024 PCP:  Pablito Jacobo MD     Dear Doctor:    I had the pleasure of seeing Melissa Albert at the [facility] on 2024 in fetal cardiology consultation for fetal echocardiogram results. She presented today accompanied by her . As you know, she is a 38 year old  at 22w0d who presented for fetal echocardiogram today because of pregnancy by IVF, Type 2 diabetes, and suboptimal views.     I performed and interpreted the fetal echocardiogram today, which was limited by poor windows but demonstrated likely normal fetal cardiac anatomy. Normal right and left ventricular size and function. The ductal region is not imaged with this study. Fetal heart rate is regular at 150 bpm. No hydrops.      I reviewed today's findings with Melissa Albert and her partner. She is aware that the study was within normal limits with no major cardiac abnormalities. She is aware of the general limitations of fetal echocardiography. No additional fetal echocardiograms are recommended. No  cardiac follow-up is required.     Thank you for allowing me to participate in Melissa's care. Please do not hesitate to contact me with questions or concerns.    This visit was separate from the performance and interpretation of the ultrasound. The majority of the time (>50%) was spent in counseling and coordination of care. I spent approximately 20 minutes in face-to-face time reviewing the above  considerations.    Nikos Higgins M.D.  Pediatric Cardiology  [facility]'[place] [address] [facility] Office Building [address] [facility], [place] [address]  Phone [phone]  Fax [phone]

## 2024-04-19 NOTE — PROGRESS NOTES
[facility]   Heart Center Fetal Consult Note    Patient:  Melissa Albert MRN:  [id number]   YOB: 1985 Age:  38 year old   Date of Visit:  2024 PCP:  Pablito Jacobo MD     Dear Doctor:    I had the pleasure of seeing Melissa Albert at the [facility] on 2024 in fetal cardiology consultation for fetal echocardiogram results. She presented today accompanied by her . As you know, she is a 38 year old  at 22w0d who presented for fetal echocardiogram today because of pregnancy by IVF, Type 2 diabetes, and suboptimal views.     I performed and interpreted the fetal echocardiogram today, which was limited by poor windows but demonstrated likely normal fetal cardiac anatomy. Normal right and left ventricular size and function. The ductal region is not imaged with this study. Fetal heart rate is regular at 150 bpm. No hydrops.      I reviewed today's findings with Melissa Albert and her partner. She is aware that the study was within normal limits with no major cardiac abnormalities. She is aware of the general limitations of fetal echocardiography. No additional fetal echocardiograms are recommended. No  cardiac follow-up is required.     Thank you for allowing me to participate in Melissa's care. Please do not hesitate to contact me with questions or concerns.    This visit was separate from the performance and interpretation of the ultrasound. The majority of the time (>50%) was spent in counseling and coordination of care. I spent approximately 20 minutes in face-to-face time reviewing the above considerations.    Nikos Higgins M.D.  Pediatric Cardiology  [address] [place] [id number]  Phone [phone]  Fax [phone]

## 2024-04-25 ENCOUNTER — OFFICE VISIT (OUTPATIENT)
Dept: MATERNAL FETAL MEDICINE | Facility: HOSPITAL | Age: 39
End: 2024-04-25
Attending: OBSTETRICS & GYNECOLOGY
Payer: COMMERCIAL

## 2024-04-25 ENCOUNTER — ANCILLARY PROCEDURE (OUTPATIENT)
Dept: ULTRASOUND IMAGING | Facility: HOSPITAL | Age: 39
End: 2024-04-25
Attending: OBSTETRICS & GYNECOLOGY
Payer: COMMERCIAL

## 2024-04-25 DIAGNOSIS — O24.112 PREGNANCY COMPLICATED BY PRE-EXISTING TYPE 2 DIABETES, SECOND TRIMESTER: Primary | ICD-10-CM

## 2024-04-25 DIAGNOSIS — O24.113 TYPE 2 DIABETES MELLITUS COMPLICATING PREGNANCY IN THIRD TRIMESTER, ANTEPARTUM: ICD-10-CM

## 2024-04-25 DIAGNOSIS — O99.210 OBESITY IN PREGNANCY: ICD-10-CM

## 2024-04-25 DIAGNOSIS — Z03.73 FETAL ANOMALY SUSPECTED BUT NOT FOUND: ICD-10-CM

## 2024-04-25 PROCEDURE — 76816 OB US FOLLOW-UP PER FETUS: CPT

## 2024-04-25 PROCEDURE — 76816 OB US FOLLOW-UP PER FETUS: CPT | Mod: 26 | Performed by: OBSTETRICS & GYNECOLOGY

## 2024-04-25 PROCEDURE — 99207 PR NO CHARGE LOS: CPT | Performed by: OBSTETRICS & GYNECOLOGY

## 2024-04-25 NOTE — NURSING NOTE
Melissa Albert is a  at 22w6d who presents to [facility] for scheduled follow up anatomy ultrasound. Pt reports bleeding episode last week, brown blood only noted for 30 minutes and none since. Patient called her on call provider and they recommended she slow down until this ultrasound so she has limited a lot of activity since then. SBAR given to Dr. Schrader, see note in Epic.

## 2024-06-03 ENCOUNTER — LAB REQUISITION (OUTPATIENT)
Dept: LAB | Facility: CLINIC | Age: 39
End: 2024-06-03
Payer: COMMERCIAL

## 2024-06-03 DIAGNOSIS — Z11.3 ENCOUNTER FOR SCREENING FOR INFECTIONS WITH A PREDOMINANTLY SEXUAL MODE OF TRANSMISSION: ICD-10-CM

## 2024-06-03 DIAGNOSIS — O99.019 ANEMIA COMPLICATING PREGNANCY, UNSPECIFIED TRIMESTER: ICD-10-CM

## 2024-06-03 LAB
ERYTHROCYTE [DISTWIDTH] IN BLOOD BY AUTOMATED COUNT: 17.7 % (ref 10–15)
FERRITIN SERPL-MCNC: 6 NG/ML (ref 6–175)
HCT VFR BLD AUTO: 28.7 % (ref 35–47)
HGB BLD-MCNC: 8.6 G/DL (ref 11.7–15.7)
MCH RBC QN AUTO: 24.9 PG (ref 26.5–33)
MCHC RBC AUTO-ENTMCNC: 30 G/DL (ref 31.5–36.5)
MCV RBC AUTO: 83 FL (ref 78–100)
PLATELET # BLD AUTO: 324 10E3/UL (ref 150–450)
RBC # BLD AUTO: 3.45 10E6/UL (ref 3.8–5.2)
WBC # BLD AUTO: 9.4 10E3/UL (ref 4–11)

## 2024-06-03 PROCEDURE — 82728 ASSAY OF FERRITIN: CPT | Mod: ORL | Performed by: OBSTETRICS & GYNECOLOGY

## 2024-06-03 PROCEDURE — 86780 TREPONEMA PALLIDUM: CPT | Mod: ORL | Performed by: OBSTETRICS & GYNECOLOGY

## 2024-06-03 PROCEDURE — 85027 COMPLETE CBC AUTOMATED: CPT | Mod: ORL | Performed by: OBSTETRICS & GYNECOLOGY

## 2024-06-04 LAB — T PALLIDUM AB SER QL: NONREACTIVE

## 2024-06-10 ENCOUNTER — HOSPITAL ENCOUNTER (INPATIENT)
Facility: HOSPITAL | Age: 39
LOS: 4 days | Discharge: HOME | DRG: 832 | End: 2024-06-14
Attending: OBSTETRICS & GYNECOLOGY | Admitting: OBSTETRICS & GYNECOLOGY
Payer: COMMERCIAL

## 2024-06-10 ENCOUNTER — APPOINTMENT (OUTPATIENT)
Dept: ULTRASOUND IMAGING | Facility: HOSPITAL | Age: 39
DRG: 832 | End: 2024-06-10
Attending: OBSTETRICS & GYNECOLOGY
Payer: COMMERCIAL

## 2024-06-10 DIAGNOSIS — O46.90 VAGINAL BLEEDING IN PREGNANCY: Primary | ICD-10-CM

## 2024-06-10 DIAGNOSIS — O24.113 TYPE 2 DIABETES MELLITUS COMPLICATING PREGNANCY IN THIRD TRIMESTER, ANTEPARTUM: ICD-10-CM

## 2024-06-10 DIAGNOSIS — E11.9 TYPE 2 DIABETES MELLITUS WITHOUT COMPLICATION, WITHOUT LONG-TERM CURRENT USE OF INSULIN (H): ICD-10-CM

## 2024-06-10 LAB
ABO/RH(D): NORMAL
ALBUMIN UR-MCNC: 10 MG/DL
ANION GAP SERPL CALCULATED.3IONS-SCNC: 12 MMOL/L (ref 7–15)
ANTIBODY SCREEN: NEGATIVE
APPEARANCE UR: CLEAR
APTT PPP: 27 SECONDS (ref 22–38)
BACTERIA #/AREA URNS HPF: ABNORMAL /HPF
BASOPHILS # BLD AUTO: 0 10E3/UL (ref 0–0.2)
BASOPHILS NFR BLD AUTO: 0 %
BILIRUB UR QL STRIP: NEGATIVE
BUN SERPL-MCNC: 2.9 MG/DL (ref 6–20)
CALCIUM SERPL-MCNC: 8.4 MG/DL (ref 8.6–10)
CHLORIDE SERPL-SCNC: 106 MMOL/L (ref 98–107)
COLOR UR AUTO: COLORLESS
CREAT SERPL-MCNC: 0.47 MG/DL (ref 0.51–0.95)
DEPRECATED HCO3 PLAS-SCNC: 19 MMOL/L (ref 22–29)
EGFRCR SERPLBLD CKD-EPI 2021: >90 ML/MIN/1.73M2
EOSINOPHIL # BLD AUTO: 0.1 10E3/UL (ref 0–0.7)
EOSINOPHIL NFR BLD AUTO: 1 %
ERYTHROCYTE [DISTWIDTH] IN BLOOD BY AUTOMATED COUNT: 19.5 % (ref 10–15)
GLUCOSE BLDC GLUCOMTR-MCNC: 108 MG/DL (ref 70–99)
GLUCOSE BLDC GLUCOMTR-MCNC: 127 MG/DL (ref 70–99)
GLUCOSE BLDC GLUCOMTR-MCNC: 135 MG/DL (ref 70–99)
GLUCOSE BLDC GLUCOMTR-MCNC: 69 MG/DL (ref 70–99)
GLUCOSE BLDC GLUCOMTR-MCNC: 92 MG/DL (ref 70–99)
GLUCOSE SERPL-MCNC: 114 MG/DL (ref 70–99)
GLUCOSE SERPL-MCNC: 114 MG/DL (ref 70–99)
GLUCOSE UR STRIP-MCNC: NEGATIVE MG/DL
HBA1C MFR BLD: 5.6 %
HCT VFR BLD AUTO: 28.2 % (ref 35–47)
HGB BLD-MCNC: 8.5 G/DL (ref 11.7–15.7)
HGB UR QL STRIP: ABNORMAL
HOLD SPECIMEN: NORMAL
IMM GRANULOCYTES # BLD: 0.3 10E3/UL
IMM GRANULOCYTES NFR BLD: 3 %
INR PPP: 1.03 (ref 0.85–1.15)
KETONES UR STRIP-MCNC: NEGATIVE MG/DL
LEUKOCYTE ESTERASE UR QL STRIP: NEGATIVE
LYMPHOCYTES # BLD AUTO: 1.8 10E3/UL (ref 0.8–5.3)
LYMPHOCYTES NFR BLD AUTO: 17 %
MCH RBC QN AUTO: 25.1 PG (ref 26.5–33)
MCHC RBC AUTO-ENTMCNC: 30.1 G/DL (ref 31.5–36.5)
MCV RBC AUTO: 83 FL (ref 78–100)
MONOCYTES # BLD AUTO: 0.4 10E3/UL (ref 0–1.3)
MONOCYTES NFR BLD AUTO: 3 %
NEUTROPHILS # BLD AUTO: 8.1 10E3/UL (ref 1.6–8.3)
NEUTROPHILS NFR BLD AUTO: 76 %
NITRATE UR QL: NEGATIVE
NRBC # BLD AUTO: 0.1 10E3/UL
NRBC BLD AUTO-RTO: 1 /100
PH UR STRIP: 7.5 [PH] (ref 5–7)
PLATELET # BLD AUTO: 335 10E3/UL (ref 150–450)
POTASSIUM SERPL-SCNC: 4.2 MMOL/L (ref 3.4–5.3)
RBC # BLD AUTO: 3.38 10E6/UL (ref 3.8–5.2)
RBC URINE: 85 /HPF
SODIUM SERPL-SCNC: 137 MMOL/L (ref 135–145)
SP GR UR STRIP: 1 (ref 1–1.03)
SPECIMEN EXPIRATION DATE: NORMAL
SQUAMOUS EPITHELIAL: 2 /HPF
T PALLIDUM AB SER QL: NONREACTIVE
UROBILINOGEN UR STRIP-MCNC: <2 MG/DL
WBC # BLD AUTO: 10.7 10E3/UL (ref 4–11)
WBC URINE: 2 /HPF

## 2024-06-10 PROCEDURE — 80048 BASIC METABOLIC PNL TOTAL CA: CPT

## 2024-06-10 PROCEDURE — 36415 COLL VENOUS BLD VENIPUNCTURE: CPT

## 2024-06-10 PROCEDURE — 81001 URINALYSIS AUTO W/SCOPE: CPT | Performed by: OBSTETRICS & GYNECOLOGY

## 2024-06-10 PROCEDURE — 85730 THROMBOPLASTIN TIME PARTIAL: CPT | Performed by: OBSTETRICS & GYNECOLOGY

## 2024-06-10 PROCEDURE — 250N000011 HC RX IP 250 OP 636: Performed by: OBSTETRICS & GYNECOLOGY

## 2024-06-10 PROCEDURE — 250N000012 HC RX MED GY IP 250 OP 636 PS 637

## 2024-06-10 PROCEDURE — 86900 BLOOD TYPING SEROLOGIC ABO: CPT | Performed by: OBSTETRICS & GYNECOLOGY

## 2024-06-10 PROCEDURE — 250N000013 HC RX MED GY IP 250 OP 250 PS 637: Performed by: OBSTETRICS & GYNECOLOGY

## 2024-06-10 PROCEDURE — 36415 COLL VENOUS BLD VENIPUNCTURE: CPT | Performed by: OBSTETRICS & GYNECOLOGY

## 2024-06-10 PROCEDURE — 258N000003 HC RX IP 258 OP 636: Mod: JZ | Performed by: OBSTETRICS & GYNECOLOGY

## 2024-06-10 PROCEDURE — 85610 PROTHROMBIN TIME: CPT | Performed by: OBSTETRICS & GYNECOLOGY

## 2024-06-10 PROCEDURE — 85025 COMPLETE CBC W/AUTO DIFF WBC: CPT | Performed by: OBSTETRICS & GYNECOLOGY

## 2024-06-10 PROCEDURE — 120N000001 HC R&B MED SURG/OB

## 2024-06-10 PROCEDURE — 86923 COMPATIBILITY TEST ELECTRIC: CPT | Performed by: OBSTETRICS & GYNECOLOGY

## 2024-06-10 PROCEDURE — 76805 OB US >/= 14 WKS SNGL FETUS: CPT

## 2024-06-10 PROCEDURE — 86780 TREPONEMA PALLIDUM: CPT | Performed by: OBSTETRICS & GYNECOLOGY

## 2024-06-10 PROCEDURE — 83036 HEMOGLOBIN GLYCOSYLATED A1C: CPT | Performed by: OBSTETRICS & GYNECOLOGY

## 2024-06-10 RX ORDER — DIPHENHYDRAMINE HYDROCHLORIDE 50 MG/ML
50 INJECTION INTRAMUSCULAR; INTRAVENOUS
Status: DISCONTINUED | OUTPATIENT
Start: 2024-06-10 | End: 2024-06-14 | Stop reason: HOSPADM

## 2024-06-10 RX ORDER — METOCLOPRAMIDE HYDROCHLORIDE 5 MG/ML
10 INJECTION INTRAMUSCULAR; INTRAVENOUS EVERY 6 HOURS PRN
Status: DISCONTINUED | OUTPATIENT
Start: 2024-06-10 | End: 2024-06-14 | Stop reason: HOSPADM

## 2024-06-10 RX ORDER — FERROUS SULFATE 325(65) MG
325 TABLET ORAL 2 TIMES DAILY
COMMUNITY
End: 2024-10-02

## 2024-06-10 RX ORDER — METHYLPREDNISOLONE SODIUM SUCCINATE 125 MG/2ML
125 INJECTION, POWDER, LYOPHILIZED, FOR SOLUTION INTRAMUSCULAR; INTRAVENOUS
Status: DISCONTINUED | OUTPATIENT
Start: 2024-06-10 | End: 2024-06-14 | Stop reason: HOSPADM

## 2024-06-10 RX ORDER — DEXTROSE MONOHYDRATE 25 G/50ML
25-50 INJECTION, SOLUTION INTRAVENOUS
Status: DISCONTINUED | OUTPATIENT
Start: 2024-06-10 | End: 2024-06-10

## 2024-06-10 RX ORDER — AMOXICILLIN 250 MG
2 CAPSULE ORAL 2 TIMES DAILY
Status: DISCONTINUED | OUTPATIENT
Start: 2024-06-10 | End: 2024-06-14 | Stop reason: HOSPADM

## 2024-06-10 RX ORDER — INSULIN LISPRO 100 [IU]/ML
1 INJECTION, SOLUTION INTRAVENOUS; SUBCUTANEOUS
Status: ON HOLD | COMMUNITY
End: 2024-08-02

## 2024-06-10 RX ORDER — NICOTINE POLACRILEX 4 MG
15-30 LOZENGE BUCCAL
Status: DISCONTINUED | OUTPATIENT
Start: 2024-06-10 | End: 2024-06-14 | Stop reason: HOSPADM

## 2024-06-10 RX ORDER — ONDANSETRON 4 MG/1
4 TABLET, ORALLY DISINTEGRATING ORAL EVERY 6 HOURS PRN
Status: DISCONTINUED | OUTPATIENT
Start: 2024-06-10 | End: 2024-06-14 | Stop reason: HOSPADM

## 2024-06-10 RX ORDER — CALCIUM CARBONATE 500 MG/1
500-1000 TABLET, CHEWABLE ORAL 3 TIMES DAILY PRN
Status: DISCONTINUED | OUTPATIENT
Start: 2024-06-10 | End: 2024-06-14 | Stop reason: HOSPADM

## 2024-06-10 RX ORDER — BETAMETHASONE SODIUM PHOSPHATE AND BETAMETHASONE ACETATE 3; 3 MG/ML; MG/ML
12 INJECTION, SUSPENSION INTRA-ARTICULAR; INTRALESIONAL; INTRAMUSCULAR; SOFT TISSUE EVERY 24 HOURS
Status: COMPLETED | OUTPATIENT
Start: 2024-06-10 | End: 2024-06-11

## 2024-06-10 RX ORDER — HYDROXYZINE HYDROCHLORIDE 50 MG/1
100 TABLET, FILM COATED ORAL
Status: DISCONTINUED | OUTPATIENT
Start: 2024-06-10 | End: 2024-06-14 | Stop reason: HOSPADM

## 2024-06-10 RX ORDER — ONDANSETRON 2 MG/ML
4 INJECTION INTRAMUSCULAR; INTRAVENOUS EVERY 6 HOURS PRN
Status: DISCONTINUED | OUTPATIENT
Start: 2024-06-10 | End: 2024-06-14 | Stop reason: HOSPADM

## 2024-06-10 RX ORDER — PROCHLORPERAZINE MALEATE 10 MG
10 TABLET ORAL EVERY 6 HOURS PRN
Status: DISCONTINUED | OUTPATIENT
Start: 2024-06-10 | End: 2024-06-14 | Stop reason: HOSPADM

## 2024-06-10 RX ORDER — MAGNESIUM HYDROXIDE/ALUMINUM HYDROXICE/SIMETHICONE 120; 1200; 1200 MG/30ML; MG/30ML; MG/30ML
30 SUSPENSION ORAL
Status: DISCONTINUED | OUTPATIENT
Start: 2024-06-10 | End: 2024-06-14 | Stop reason: HOSPADM

## 2024-06-10 RX ORDER — AMOXICILLIN 250 MG
1 CAPSULE ORAL 2 TIMES DAILY
Status: DISCONTINUED | OUTPATIENT
Start: 2024-06-10 | End: 2024-06-14 | Stop reason: HOSPADM

## 2024-06-10 RX ORDER — LIDOCAINE 40 MG/G
CREAM TOPICAL
Status: DISCONTINUED | OUTPATIENT
Start: 2024-06-10 | End: 2024-06-14 | Stop reason: HOSPADM

## 2024-06-10 RX ORDER — SODIUM CHLORIDE, SODIUM LACTATE, POTASSIUM CHLORIDE, CALCIUM CHLORIDE 600; 310; 30; 20 MG/100ML; MG/100ML; MG/100ML; MG/100ML
INJECTION, SOLUTION INTRAVENOUS CONTINUOUS
Status: DISCONTINUED | OUTPATIENT
Start: 2024-06-10 | End: 2024-06-14 | Stop reason: HOSPADM

## 2024-06-10 RX ORDER — ACETAMINOPHEN 325 MG/1
650 TABLET ORAL EVERY 4 HOURS PRN
Status: DISCONTINUED | OUTPATIENT
Start: 2024-06-10 | End: 2024-06-14 | Stop reason: HOSPADM

## 2024-06-10 RX ORDER — NICOTINE POLACRILEX 4 MG
15-30 LOZENGE BUCCAL
Status: DISCONTINUED | OUTPATIENT
Start: 2024-06-10 | End: 2024-06-10

## 2024-06-10 RX ORDER — METOCLOPRAMIDE 10 MG/1
10 TABLET ORAL EVERY 6 HOURS PRN
Status: DISCONTINUED | OUTPATIENT
Start: 2024-06-10 | End: 2024-06-14 | Stop reason: HOSPADM

## 2024-06-10 RX ORDER — DEXTROSE MONOHYDRATE 25 G/50ML
25-50 INJECTION, SOLUTION INTRAVENOUS
Status: DISCONTINUED | OUTPATIENT
Start: 2024-06-10 | End: 2024-06-14 | Stop reason: HOSPADM

## 2024-06-10 RX ORDER — PRENATAL VIT/IRON FUM/FOLIC AC 27MG-0.8MG
1 TABLET ORAL DAILY
Status: DISCONTINUED | OUTPATIENT
Start: 2024-06-10 | End: 2024-06-14 | Stop reason: HOSPADM

## 2024-06-10 RX ORDER — PROCHLORPERAZINE 25 MG
25 SUPPOSITORY, RECTAL RECTAL EVERY 12 HOURS PRN
Status: DISCONTINUED | OUTPATIENT
Start: 2024-06-10 | End: 2024-06-14 | Stop reason: HOSPADM

## 2024-06-10 RX ADMIN — BETAMETHASONE ACETATE AND BETAMETHASONE SODIUM PHOSPHATE 12 MG: 3; 3 INJECTION, SUSPENSION INTRA-ARTICULAR; INTRALESIONAL; INTRAMUSCULAR; SOFT TISSUE at 11:11

## 2024-06-10 RX ADMIN — SODIUM CHLORIDE, POTASSIUM CHLORIDE, SODIUM LACTATE AND CALCIUM CHLORIDE: 600; 310; 30; 20 INJECTION, SOLUTION INTRAVENOUS at 11:17

## 2024-06-10 RX ADMIN — SENNOSIDES AND DOCUSATE SODIUM 1 TABLET: 8.6; 5 TABLET ORAL at 22:06

## 2024-06-10 RX ADMIN — CALCIUM CARBONATE (ANTACID) CHEW TAB 500 MG 1000 MG: 500 CHEW TAB at 20:35

## 2024-06-10 RX ADMIN — INSULIN GLARGINE 130 UNITS: 100 INJECTION, SOLUTION SUBCUTANEOUS at 22:08

## 2024-06-10 RX ADMIN — SODIUM CHLORIDE 25 MG: 9 INJECTION, SOLUTION INTRAVENOUS at 15:23

## 2024-06-10 RX ADMIN — SODIUM CHLORIDE 975 MG: 9 INJECTION, SOLUTION INTRAVENOUS at 17:19

## 2024-06-10 RX ADMIN — HYDROXYZINE HYDROCHLORIDE 100 MG: 50 TABLET ORAL at 22:06

## 2024-06-10 ASSESSMENT — ACTIVITIES OF DAILY LIVING (ADL)
HEARING_DIFFICULTY_OR_DEAF: NO
ADLS_ACUITY_SCORE: 20
ADLS_ACUITY_SCORE: 20
ADLS_ACUITY_SCORE: 35
DIFFICULTY_COMMUNICATING: NO
DOING_ERRANDS_INDEPENDENTLY_DIFFICULTY: NO
DRESSING/BATHING_DIFFICULTY: NO
ADLS_ACUITY_SCORE: 20
ADLS_ACUITY_SCORE: 35
WEAR_GLASSES_OR_BLIND: YES
ADLS_ACUITY_SCORE: 20
WALKING_OR_CLIMBING_STAIRS_DIFFICULTY: NO
FALL_HISTORY_WITHIN_LAST_SIX_MONTHS: NO
CONCENTRATING,_REMEMBERING_OR_MAKING_DECISIONS_DIFFICULTY: NO
ADLS_ACUITY_SCORE: 20
DIFFICULTY_EATING/SWALLOWING: NO
ADLS_ACUITY_SCORE: 35
TOILETING_ISSUES: NO
CHANGE_IN_FUNCTIONAL_STATUS_SINCE_ONSET_OF_CURRENT_ILLNESS/INJURY: NO
ADLS_ACUITY_SCORE: 20
ADLS_ACUITY_SCORE: 35

## 2024-06-10 NOTE — PROGRESS NOTES
Data: [facility]: 6/10/2024 10:02 AM.  Reason for maternal/fetal assessment is vaginal bleeding. Patient reports [place] blood vaginally at . Patient denies uterine contractions, leaking of vaginal fluid/rupture of membranes, abdominal pain, pelvic pressure, nausea, vomiting, headache, visual disturbances, epigastric or RUQ pain, significant edema. Patient reports fetal movement is normal. Patient is a 29w3d .  Prenatal record reviewed. Pregnancy has been complicated by diabetes, advanced maternal age (>=34yo), obesity (pre-pregnancy BMI >=35), and bleeding a 24 weeks and 29 weeks.  .    Vital signs wnl. Support person is present.     Action: Verbal consent for EFM. Triage assessment completed.     Response: Patient verbalized agreement with plan. Dr. Sultana called to the bedside to assist with Ultrasound due to difficulty tracing FHR. FHR found 140's and fetal positioning identified as vertex. Will continue to monitor, Dr. Sultana placing orders and hand of to Dr. Jimenez.

## 2024-06-10 NOTE — H&P
"[facility] Labor and Delivery History and Physical    Melissa Wilde MRN# [id number]   Age: 39 year old YOB: 1985     Date of Admission:  6/10/2024    Primary care provider: Pablito Jacobo           Chief Complaint:   Melissa Wilde is a 39 year old female who is 29w3d pregnant and being admitted for vaginal bleeding.  Patient was at home working at kitchen table and felt a gush of chema blood, does not think also amniotic fluid and came in through ER.  She denies significant pain, does feel a little anxious.  Reports an episode of bleeding that was much less at 24 weeks and did not require admission.  Per report fundal and posterior placenta without evidence of previa.  Endorses good fetal movement.            Pregnancy history:     OBSTETRIC HISTORY:    OB History    Para Term  AB Living   3 1 1 0 1 1   SAB IAB Ectopic Multiple Live Births   0 0 0 0 1      # Outcome Date GA Lbr Elan/2nd Weight Sex Type Anes PTL Lv   3 Current            2 AB 2023     AB, MISSED      1 Term 22 37w1d  3.73 kg (8 lb 3.6 oz) M CS-LTranv   BRAYAN      Name: SIMON WILDE-MELISSA      Apgar1: 6  Apgar5: 8       EDC: Estimated Date of Delivery: 24    Prenatal Labs:   Lab Results   Component Value Date    ABO A 2018    RH Pos 2018    AS Negative 2024    HEPBANG Nonreactive 2024    HGB 8.6 (L) 2024       GBS Status:   No results found for: \"GBS\"    Active Problem List  Patient Active Problem List   Diagnosis    S/P laparotomy    Type 2 diabetes mellitus without complication, without long-term current use of insulin (H)    Spondylolisthesis    Recurrent major depressive disorder, in partial remission (H24)    Positive antinuclear antibody    Patellofemoral pain syndrome    Pain in joint    Morbid obesity with body mass index of 40.0-49.9 (H)    Migraine with aura, not intractable, without status migrainosus    Microscopic colitis    Iron deficiency anemia    " Intermittent asthma    Elevated blood pressure    Anal fissure    Fibromyalgia    Type 2 diabetes mellitus complicating pregnancy in third trimester, antepartum    Missed     Trigger thumb of left hand    Vaginal bleeding in pregnancy       Medication Prior to Admission  Medications Prior to Admission   Medication Sig Dispense Refill Last Dose    Continuous Blood Gluc Transmit (DEXCOM G6 TRANSMITTER) MISC Change every 3 months.       estradiol (ESTRACE) 2 MG tablet        insulin glargine (LANTUS PEN) 100 UNIT/ML pen Inject 42 Units Subcutaneous At Bedtime       insulin lispro (HUMALOG KWIKPEN) 100 UNIT/ML (1 unit dial) KWIKPEN Product desired: HUMALOG KWIKPEN  1/8 grams cho + 1/25 for finger stick glucose > 120.Product desired: HUMALOG KWIKPEN  1/8 grams cho + 1/25 for finger stick glucose > 120. Maximum daily dose is 20 units.       ISIBLOOM 0.15-30 MG-MCG tablet        leuprolide acetate (LUPRON) 1 MG/0.2ML kit        ondansetron (ZOFRAN) 4 MG tablet Take 4 mg by mouth every 8 hours as needed for nausea       predniSONE (DELTASONE) 20 MG tablet Take 2 tablets (40 mg) by mouth daily 10 tablet 0     Prenatal MV-Min-Fe Fum-FA-DHA (PRENATAL 1 PO)        progesterone, in sesame oil, 50 MG/ML injection ADMINISTER 2 ML IN THE MUSCLE EVERY DAY AS DIRECTED      .        Maternal Past Medical History:     Past Medical History:   Diagnosis Date    Depressive disorder     Diabetes (H)     Joint pain     Uncomplicated asthma                Physical Exam:   Vitals were reviewed  Alert, O x 3  CV regular  Resp non labored  Abdomen obese  Limited abdominal ultrasound demonstrates fetal movement, cephalic and normal FHR  Fetal Heart Rate Tracing: intermittent  Tocometer: no contractions                       Assessment:   Melissa Albert is a 29w3d pregnant female admitted with vaginal bleeding at 39 weeks, no prior concern for previa, pregnancy complicated by obesity, IVF, history of myomectomy and history of prior  as  well as type 2 DM on insulin.          Plan:   Admit - Full code. NPO for now, place saline lock. Check CBCP, type and screen and coags.  Formal ultrasound to evaluate placenta and cervical length.  Initiate betamethasone.  If delivery is imminent, mag bolus for neuroprotection.  NICU consult.  Agreeable to blood transfusion, known anemia.  If stabilizes, can add IV iron.  Type 2 DM and discussed that betamethasone can effect blood sugars, if does not deliver can consult hospitalist to assist in blood glucose management.  Discussed if bleeding is life threatening to maternal status or causes fetal distress need for emergency .  All questions answered.    Stefanie Sultana MD    Total time at bedside and in coordination of care is 40 minutes.

## 2024-06-10 NOTE — CONSULTS
[facility]  Consult Note - Hospitalist Service  Date of Admission:  6/10/2024  Consult Requested by: Dr. Jimenez  Reason for Consult: T2DM management     Assessment & Plan   Melissa Albert is a 39 year old female admitted on 6/10/2024. She is a  at 29w3d with history of type 2 diabetes on insulin presenting with vaginal bleeding.  Resident team consulted to help manage insulin.    Insulin-dependent type 2 diabetes  Patient follows with endocrinology in outpatient setting, last seen on 2024 and then by DM educator .  Current insulin regimen is 130 units of Lantus at bedtime and 1:5 per carb with meals then sliding scale insulin 2 hours postprandial and with bedtime.  Her A1c on admission is 5.6.  Patient did have 1 episode of hypoglycemia on day of admission that resolved with glucose.  She was also given betamethasone given concerns for  delivery.  With this we will continue her home insulin regimen outside of adjusting sliding scale insulin to match antepartum orderset.  -Antepartum insulin orderset   -glucose checks before meals and one hour postprandial  -Lantus 130 units at bedtime  -NovoLog 1:5 units per carb with meals  -High resistance sliding scale     Vaginal bleeding  Antepartum  Patient noticed large amounts of vaginal bleeding today while working in the kitchen.  Presented to L&D for further management.  Patient is being admitted and managed by OB service. US today showing no previa with BPP 8/8 and LGA baby.  -OB managing  -s/p one dose of betamethasone  -MFM and NICU consults  -s/p iron infusions  -mIVF     The patient's care was discussed with Dr. Adame.    Clinically Significant Risk Factors Present on Admission                     # Anemia: based on hgb <11               # Asthma: noted on problem list        Gabbie Maria MD  Hospitalist Service  Securely message with [employer] (more info)  Text page via [employer] Paging/Directory    ______________________________________________________________________    Chief Complaint   Bleeding   T2DM on insulin    History is obtained from the patient and chart review    History of Present Illness   Melissa Albert is a 39 year old female who is a  at 29w3d with history of type 2 diabetes on insulin presenting with painless vaginal bleeding. She presented to L&D today and is being managed by OB for this. BPP . US without previa. Patient had large bright red clot and gush earlier today but since admission has only had pink trickling now. She is feeling overall well outside of being nervous. Baby is very active and she is not feeling any contractions.     In regards to her diabetes. Patient is followed by endocrinology and recently met with a diabetes educator. She uses a CGM and monitors her sugars with meals, one hour postprandial and 2 hours postprandial. She is on 130U of lantus nightly and 1:5 with meals. Then uses a sliding scale 2 hours postprandial and at bedtime.       Past Medical History    Past Medical History:   Diagnosis Date    Depressive disorder     Diabetes (H)     Joint pain     Uncomplicated asthma        Past Surgical History   Past Surgical History:   Procedure Laterality Date     SECTION N/A 2022    Procedure:  SECTION;  Surgeon: Oliva Hollingsworth MD;  Location:  L+D    CHOLECYSTECTOMY      DILATION AND CURETTAGE N/A 2023    Procedure: SUCTION DILATION AND CURETTAGE;  Surgeon: Shirley Lund MD;  Location: [facility] Main OR    LAPAROSCOPY DIAGNOSTIC (GYN)  2018    Procedure: LAPAROSCOPY DIAGNOSTIC (GYN);  Diagnostic Laparoscopy with conversion to laparotomy with removal of incarcerated fibroid;  Surgeon: Makayla Damian DO;  Location:  OR    LAPAROTOMY EXPLORATORY N/A 2018    Procedure: LAPAROTOMY EXPLORATORY;;  Surgeon: Makayla Damian DO;  Location:  OR    LUMBAR PUNCTURE FLUORO GUIDED DIAGNOSTIC  3/22/2019     RELEASE TRIGGER FINGER Left 1/18/2024    Procedure: RELEASE, LEFT TRIGGER THUMB;  Surgeon: Tarik Peraza MD;  Location: [facility] OR       Medications   I have reviewed this patient's current medications          Physical Exam   Vital Signs: Temp: 98.2  F (36.8  C) Temp src: Oral BP: 136/72 Pulse: 110   Resp: 16 SpO2: 100 % O2 Device: None (Room air)    Weight: 263 lbs 0 oz  GENERAL: Healthy, alert and no distress  RESP:  Lungs clear throughout. No wheeze or crackles.   CV: Heart RRR. No murmur  Abdomen: Soft, gravid.  MSK: No gross deformity. Normal tone. No LE edema.  SKIN: Visible skin clear.   NEURO: Cranial nerves grossly intact.  Mentation and speech appropriate for age.  PSYCH: Mentation appears normal, affect normal    Data     I have personally reviewed the following data over the past 24 hrs:    10.7  \   8.5 (L)   / 335     137 106 2.9 (L) /  135 (H)   4.2 19 (L) 0.47 (L) \     TSH: N/A T4: N/A A1C: 5.6     INR:  1.03 PTT:  27   D-dimer:  N/A Fibrinogen:  N/A       Imaging results reviewed over the past 24 hrs:   Recent Results (from the past 24 hour(s))   US OB > 14 Weeks    Narrative    EXAM: US OB > 14 WEEKS  LOCATION: [facility]  DATE: 6/10/2024    INDICATION: vaginal bleeding in pregnancy evaluate for abruption and cervical length and estimated fetal weight  COMPARISON: 4/25/2024, 3/20/2024  TECHNIQUE: Routine.    FINDINGS: Single intrauterine gestation, cephalic presentation. Placenta is located anterior and fundal. No subplacental hematoma.. Amniotic fluid is normal. Uterus is normal. Neither ovary is identified due to body habitus.    CERVIX: Normal appearing cervix is not visualized. On the cine loops, vaginal mucosa also not seen to assess exact location of cervix.    FETAL ANOMALY SCREEN: Not repeated today.     BIOMETRY:  Biparietal Diameter: 8.1 cm, 32 weeks 4 days,>98th percentile  Head Circumference: 30.5 cm, 34 weeks 0 days, greater than 98th percentile  Abdominal  Circumference: 28.7 cm, 32 weeks 6 days, greater than 98th percentile  Femur Length: 5.8 cm, 30 weeks 3 days,    Estimated Fetal Weight: 1909 g +/-  279 g  EFW Percentile: 87%    Fetal Heart Rate: 160 bpm    EDC by This US exam: 8/1/2024    Composite Age by This US: 32 weeks 4 days      Impression    IMPRESSION:    1.  Single, living intrauterine gestation cephalic presentation.  2.  Gestation measures 32 weeks and 4 days, large for age.  3.  Placenta is anterior and fundal without previa or evidence of subplacental hematoma.  4.  Cervix is not visualized to ascertain if it is  completely effaced or not. Translabial exam can be done as needed.  5.  SUSANA is normal.  6.  Findings discussed by the undersigned with her nurse Pallavi at 1319.

## 2024-06-10 NOTE — PROGRESS NOTES
Notified Dr. Jimenez of patients blood glucose monitoring needs and insulin needs at home. She requests a hospitalist consult. Orders placed. Notified LISA

## 2024-06-10 NOTE — PROGRESS NOTES
Hospitalist service consulted for insulin orders and insulin needs during her antepartum stay. Hospitalist calls back about 3 hrs after consult was placed and states that she can not place orders for insulin and that her capacity is to ensure that the patient has the diabetic orderset and to ensure that she is not in DKA. Still no insulin orders. Patient was not hungry at this point around approximately 1400. Dr. Jimenez as well as this RN continue to try and reach a provider that can help with the insulin orders.  Around 1515 patient had an episode of hypoglycemia and treated with apple juice. Hypoglycemia resolved with po intake. Notified Dr. Jimenez. Still awaiting the consults for Hospitalist to manage her insulin orders. Patient wants to eat and has had betamethasone. RN has followed up with Dr. Jimenez, who escalated to Dr. Rowan. RN has also followed up with Resident services and hospitalist services as well as text paged Dr. Bang regarding the diabetic orders, no return call. In the mean time, Dr. Jimenez requested that we feed the patient due to the recent hypoglycemic episode. Around 1640 the resident service calls the nurse on the unit and states that they can take the patient for management of her diabetes. Dr. Horner on the unit and seeing the patient now.

## 2024-06-11 ENCOUNTER — ANCILLARY PROCEDURE (OUTPATIENT)
Dept: ULTRASOUND IMAGING | Facility: HOSPITAL | Age: 39
DRG: 832 | End: 2024-06-11
Attending: STUDENT IN AN ORGANIZED HEALTH CARE EDUCATION/TRAINING PROGRAM
Payer: COMMERCIAL

## 2024-06-11 LAB
ALBUMIN SERPL BCG-MCNC: 3.2 G/DL (ref 3.5–5.2)
ALP SERPL-CCNC: 85 U/L (ref 40–150)
ALT SERPL W P-5'-P-CCNC: 6 U/L (ref 0–50)
ANION GAP SERPL CALCULATED.3IONS-SCNC: 11 MMOL/L (ref 7–15)
APTT PPP: 24 SECONDS (ref 22–38)
AST SERPL W P-5'-P-CCNC: 10 U/L (ref 0–45)
BILIRUB SERPL-MCNC: 0.2 MG/DL
BUN SERPL-MCNC: 4.9 MG/DL (ref 6–20)
CALCIUM SERPL-MCNC: 8.6 MG/DL (ref 8.6–10)
CHLORIDE SERPL-SCNC: 105 MMOL/L (ref 98–107)
CREAT SERPL-MCNC: 0.59 MG/DL (ref 0.51–0.95)
DEPRECATED HCO3 PLAS-SCNC: 22 MMOL/L (ref 22–29)
EGFRCR SERPLBLD CKD-EPI 2021: >90 ML/MIN/1.73M2
ERYTHROCYTE [DISTWIDTH] IN BLOOD BY AUTOMATED COUNT: 19.6 % (ref 10–15)
FIBRINOGEN PPP-MCNC: 505 MG/DL (ref 170–490)
GLUCOSE BLDC GLUCOMTR-MCNC: 111 MG/DL (ref 70–99)
GLUCOSE BLDC GLUCOMTR-MCNC: 154 MG/DL (ref 70–99)
GLUCOSE BLDC GLUCOMTR-MCNC: 159 MG/DL (ref 70–99)
GLUCOSE BLDC GLUCOMTR-MCNC: 162 MG/DL (ref 70–99)
GLUCOSE BLDC GLUCOMTR-MCNC: 87 MG/DL (ref 70–99)
GLUCOSE BLDC GLUCOMTR-MCNC: 89 MG/DL (ref 70–99)
GLUCOSE SERPL-MCNC: 117 MG/DL (ref 70–99)
HCT VFR BLD AUTO: 26.8 % (ref 35–47)
HGB BLD-MCNC: 8.2 G/DL (ref 11.7–15.7)
INR PPP: 1.09 (ref 0.85–1.15)
MCH RBC QN AUTO: 25.3 PG (ref 26.5–33)
MCHC RBC AUTO-ENTMCNC: 30.6 G/DL (ref 31.5–36.5)
MCV RBC AUTO: 83 FL (ref 78–100)
PLATELET # BLD AUTO: 311 10E3/UL (ref 150–450)
POTASSIUM SERPL-SCNC: 3.8 MMOL/L (ref 3.4–5.3)
PROT SERPL-MCNC: 6.1 G/DL (ref 6.4–8.3)
RBC # BLD AUTO: 3.24 10E6/UL (ref 3.8–5.2)
SODIUM SERPL-SCNC: 138 MMOL/L (ref 135–145)
WBC # BLD AUTO: 12.7 10E3/UL (ref 4–11)

## 2024-06-11 PROCEDURE — 250N000011 HC RX IP 250 OP 636: Performed by: OBSTETRICS & GYNECOLOGY

## 2024-06-11 PROCEDURE — 250N000013 HC RX MED GY IP 250 OP 250 PS 637: Performed by: STUDENT IN AN ORGANIZED HEALTH CARE EDUCATION/TRAINING PROGRAM

## 2024-06-11 PROCEDURE — 99233 SBSQ HOSP IP/OBS HIGH 50: CPT | Mod: GC

## 2024-06-11 PROCEDURE — 76819 FETAL BIOPHYS PROFIL W/O NST: CPT

## 2024-06-11 PROCEDURE — 85384 FIBRINOGEN ACTIVITY: CPT | Performed by: STUDENT IN AN ORGANIZED HEALTH CARE EDUCATION/TRAINING PROGRAM

## 2024-06-11 PROCEDURE — 250N000013 HC RX MED GY IP 250 OP 250 PS 637: Performed by: OBSTETRICS & GYNECOLOGY

## 2024-06-11 PROCEDURE — 99223 1ST HOSP IP/OBS HIGH 75: CPT | Mod: 25 | Performed by: OBSTETRICS & GYNECOLOGY

## 2024-06-11 PROCEDURE — 76819 FETAL BIOPHYS PROFIL W/O NST: CPT | Mod: 26 | Performed by: OBSTETRICS & GYNECOLOGY

## 2024-06-11 PROCEDURE — 76816 OB US FOLLOW-UP PER FETUS: CPT | Mod: 26 | Performed by: OBSTETRICS & GYNECOLOGY

## 2024-06-11 PROCEDURE — 85610 PROTHROMBIN TIME: CPT | Performed by: STUDENT IN AN ORGANIZED HEALTH CARE EDUCATION/TRAINING PROGRAM

## 2024-06-11 PROCEDURE — 250N000012 HC RX MED GY IP 250 OP 636 PS 637

## 2024-06-11 PROCEDURE — 82247 BILIRUBIN TOTAL: CPT | Performed by: STUDENT IN AN ORGANIZED HEALTH CARE EDUCATION/TRAINING PROGRAM

## 2024-06-11 PROCEDURE — 85730 THROMBOPLASTIN TIME PARTIAL: CPT | Performed by: STUDENT IN AN ORGANIZED HEALTH CARE EDUCATION/TRAINING PROGRAM

## 2024-06-11 PROCEDURE — 36415 COLL VENOUS BLD VENIPUNCTURE: CPT | Performed by: STUDENT IN AN ORGANIZED HEALTH CARE EDUCATION/TRAINING PROGRAM

## 2024-06-11 PROCEDURE — 85049 AUTOMATED PLATELET COUNT: CPT | Performed by: STUDENT IN AN ORGANIZED HEALTH CARE EDUCATION/TRAINING PROGRAM

## 2024-06-11 PROCEDURE — 120N000001 HC R&B MED SURG/OB

## 2024-06-11 RX ORDER — ASPIRIN 81 MG/1
81 TABLET, CHEWABLE ORAL EVERY 24 HOURS
Status: DISCONTINUED | OUTPATIENT
Start: 2024-06-11 | End: 2024-06-14 | Stop reason: HOSPADM

## 2024-06-11 RX ADMIN — SENNOSIDES AND DOCUSATE SODIUM 1 TABLET: 8.6; 5 TABLET ORAL at 08:29

## 2024-06-11 RX ADMIN — INSULIN ASPART 3 UNITS: 100 INJECTION, SOLUTION INTRAVENOUS; SUBCUTANEOUS at 17:45

## 2024-06-11 RX ADMIN — SENNOSIDES AND DOCUSATE SODIUM 1 TABLET: 8.6; 5 TABLET ORAL at 21:03

## 2024-06-11 RX ADMIN — HYDROXYZINE HYDROCHLORIDE 100 MG: 50 TABLET ORAL at 22:02

## 2024-06-11 RX ADMIN — ASPIRIN 81 MG CHEWABLE TABLET 81 MG: 81 TABLET CHEWABLE at 21:03

## 2024-06-11 RX ADMIN — BETAMETHASONE ACETATE AND BETAMETHASONE SODIUM PHOSPHATE 12 MG: 3; 3 INJECTION, SUSPENSION INTRA-ARTICULAR; INTRALESIONAL; INTRAMUSCULAR; SOFT TISSUE at 11:16

## 2024-06-11 RX ADMIN — PRENATAL VIT W/ FE FUMARATE-FA TAB 27-0.8 MG 1 TABLET: 27-0.8 TAB at 08:29

## 2024-06-11 ASSESSMENT — ACTIVITIES OF DAILY LIVING (ADL)
ADLS_ACUITY_SCORE: 20

## 2024-06-11 NOTE — PLAN OF CARE
Problem: Antepartum Bleeding  Goal: Absence of Bleeding  Outcome: Progressing   Goal Outcome Evaluation:    Vitals stable.  Patient denies any pain or contractions.  Patient reports only minimal bleeding when wiping otherwise nothing in the pad.  No complaints or concerns.  Blood sugars have been within range.

## 2024-06-11 NOTE — PROGRESS NOTES
"  29=4 weeks  Admitted for vaginal bleeding   No further bleeding  Sugars are well controlled  MFM to see today- will await their recommendations  Reactive tracing   No contractions  /54 (BP Location: Left arm, Patient Position: Semi-Talamantes's, Cuff Size: Adult Large)   Pulse 110   Temp 98.6  F (37  C) (Oral)   Resp 18   Ht 1.6 m (5' 3\")   Wt 119.3 kg (263 lb)   LMP 2023 (Exact Date)   SpO2 98%   BMI 46.59 kg/m      "

## 2024-06-11 NOTE — PROGRESS NOTES
Dr Jones in unit and reviewed fetal tracing. POC may be off EFM for MFM BPP and continue with Qshift NST pending BPP and MFM consult results.

## 2024-06-11 NOTE — CONSULTS
Maternal-Fetal Medicine Consultation     Melissa Albert MRN# [id number]   YOB: 1985 Age: 39 year old   Date of Admission: 6/10/2024     Reason for consult: I was asked by Dr. Jimenez to evaluate this patient for vaginal bleeding in pregnancy.           Assessment and Plan:     Melissa Albert is a  at 29w4d admitted with vaginal bleeding.  Pregnancy also complicated by pregestational DM type 2.    1) Vaginal Bleeding  Melissa's overall clinical picture is most consistent with a marginal placental abruption, given the quantity of bleeding described and the subchorionic hemorrhage seen on today's US.  At this point, both maternal and fetal status are stable and reassuring and the bleeding has now reduced to scant spotting.  At this gestational age, continued expectant management is recommended.  She has received one dose of BMZ with 2nd dose anticipate this PM.  If delivery anticipated, recommend administration of Magnesium sulfate 6 gm bolus followed by 2 gm IV/hour for fetal neuroprotection (recommend use of order set for Magnesium sulfate).    Recommend TID EFM x 1 hour and prn with any additional bleeding, contractions, abdominal pain, decreased fetal movement, LOF or change in maternal or fetal clinical status.  If increasing bleeding noted, recommend continuous external fetal monitoring and if significant bleeding noted, delivery may be indicated based on quantity of bleeding and maternal hemodynamic status/EFM results.  If Melissa's bleeding continues to improve, once she has had no bleeding x 72 hours and if maternal and fetal status continue to be stable and reassuring, she can be transitioned to outpatient management at that time.  As outpatient, recommend weekly BPP, increasing to twice weekly at 32 weeks and serial growth US every 3 weeks, which I anticipate will be scheduled at [facility] [place].     Melissa has anemia preceding this episodes of bleeding and her hemoglobin is stable and there is no evidence of  coagulopathy.  Recommend daily CBC and BMP during Melissa's hospitalization to trend her labs and ensure no evidence of DKA (e.g., ensure AG not > 12).      Given BMI > 40, consider initiation of Lovenox for DVT prophylaxis as hospitalization > 72 hours is anticipated once Melissa is clinically stable.  As she is currently still experiencing bleeding, the risks of initiating Lovenox outweigh the benefits at this time and recommend continuing to re-evaluate this throughout her hospitalization.  Recommend SCD's while in bed and recommend ambulating during hospitalization as well.     If delivery is indicated, CS is planned due to history of myomectomy.      2) Type 2 DM          Melissa states that her diabetes has been well controlled on 130U of Lantus at hs at Novalog 1U/5CHO.  We reviewed that hyperglycemia is anticipated post-BMZ and that these effects peak 4-10 hours after administration of BMZ and will continue to 2-5 days after her last injection.    Recommend checking fingerstick glucose pre-prandial, 1 hour post-prandial, at hs and overnight at 2-3 AM.     As insulin resistance is anticipated to increase due to BMZ administration, recommend increasing insulin dose as well.  One proposed algorithm is:    Day 1: Increase nighttime insulin by 25%   Day 2: Increase all doses by 40%   Day 3: Increase all doses by 40%   Day 4: Increase all doses by 20%   Day 5: Increase all doses by 10%   Day 6-7: Return to baseline insulin     Given that she is already on quite a high dose, agree with plan for a modest increase in her Lantus dose of 20 U and continue sliding scale pre-meal coverage.  Following this, her Lantus can continue to be titrated upward and this can be titrated to her response to this evening's dose increase.  If she is still requiring sliding scale coverage, can increase all insulin doses (both Lantus and meal coverage) by 30-40% tomorrow per algorithm.     Agree with continued pre-meal glucose check with use of  sliding scale to correct for premeal glucose > 100.  This customs sliding scale can be entered into the existing sliding scale insulin order set:    For Pre-Meal BG < 100 give 0 units.  For Pre-Meal  to 140 give 2 units.  For Pre-Meal  to 160 give 3 units.  For Pre-Meal  to 180 give 4 units.  For Pre-Meal  to 200 give 5 units.  For Pre-Meal  to 250 give 6 units.  For Pre-Meal  to 300 give 8 units.  For Pre-Meal BG > 300 give 10 units.    If glucose persistently > 180, recommend OB high intensity insulin drip order set with every 1 hour  glucose checks in addition to PTA basal/bolus insulin orders     Appreciate our Family Medicine colleagues assistance with Melissa's diabetes management and their great care.    Thank-you for the opportunity to participate in the care of this patient.  If you have questions regarding today's evaluation or if we can be of further service, please contact the Maternal[facility]Fetal Medicine Center or the MFM on call at [facility].      75 MINUTES SPENT BY ME on the date of service doing chart review, history, exam, documentation & further activities per the note.      Jyothi Bird MD             Chief Complaint:   Vaginal bleeding    History is obtained from the patient         History of Present Illness:   This patient is a 39 year old female  at 29w4d who presents with sudden onset of vaginal bleeding that began yesterday around 9-10 AM when she was sitting at her desk working. She denies any antecedent abdominal pain, contractions or trauma.  She states that the bleeding was significant, running down her legs, which prompted her to present to the hospital immediately.  Since admission, Melissa states that she has still noted some red/pink spotting, but no chema bleeding.  She endorses normal fetal movement.               Past Medical History:     Past Medical History:   Diagnosis Date    Depressive disorder     Diabetes (H)     Joint pain     Uncomplicated  asthma              Past Surgical History:     Past Surgical History:   Procedure Laterality Date     SECTION N/A 2022    Procedure:  SECTION;  Surgeon: Oliva Hollingsworth MD;  Location: [facility] L+D    CHOLECYSTECTOMY      DILATION AND CURETTAGE N/A 2023    Procedure: SUCTION DILATION AND CURETTAGE;  Surgeon: Shirley Lund MD;  Location: [facility] Main OR    LAPAROSCOPY DIAGNOSTIC (GYN)  2018    Procedure: LAPAROSCOPY DIAGNOSTIC (GYN);  Diagnostic Laparoscopy with conversion to laparotomy with removal of incarcerated fibroid;  Surgeon: Makayla Damian DO;  Location: [facility] OR    LAPAROTOMY EXPLORATORY N/A 2018    Procedure: LAPAROTOMY EXPLORATORY;;  Surgeon: Makayla Damian DO;  Location: [facility] OR    LUMBAR PUNCTURE FLUORO GUIDED DIAGNOSTIC  3/22/2019    RELEASE TRIGGER FINGER Left 2024    Procedure: RELEASE, LEFT TRIGGER THUMB;  Surgeon: Tarik Peraza MD;  Location: [facility] OR            Social History:     Social History     Tobacco Use    Smoking status: Never     Passive exposure: Never    Smokeless tobacco: Never   Substance Use Topics    Alcohol use: Not Currently           Allergies:     Allergies   Allergen Reactions    Nitrates, Organic Other (See Comments)     Headache, nausea    Nsaids GI Disturbance             Medications:     Medications Prior to Admission   Medication Sig Dispense Refill Last Dose    Continuous Blood Gluc Transmit (DEXCOM G6 TRANSMITTER) MISC Change every 3 months.   6/10/2024 at 1100    ferrous sulfate (FEROSUL) 325 (65 Fe) MG tablet Take 325 mg by mouth 2 times daily   6/10/2024    insulin glargine (LANTUS PEN) 100 UNIT/ML pen Inject 130 Units Subcutaneous at bedtime Every three days if fasting BG is not below 95, increase by 10units (per patient)   2024    insulin lispro (HUMALOG KWIKPEN) 100 UNIT/ML (1 unit dial) KWIKPEN Inject 1 Units Subcutaneous 4 times daily (with meals and nightly) 1 UNIT FOR EVERY 5 .   6/10/2024     insulin lispro (HUMALOG KWIKPEN) 100 UNIT/ML (1 unit dial) KWIKPEN Inject 1 units/carb unit Subcutaneous 4 times daily (with meals and nightly) 1 unit of insulin per every 5 grams of CHO   6/9/2024    ondansetron (ZOFRAN) 4 MG tablet Take 4 mg by mouth every 8 hours as needed for nausea   Past Month    Prenatal MV-Min-Fe Fum-FA-DHA (PRENATAL 1 PO)    6/9/2024             Review of Systems:     The 5 point Review of Systems is negative other than noted in the HPI           Physical Exam:   Vitals were reviewed  Patient Vitals for the past 8 hrs:   BP Temp Temp src Resp   06/11/24 1129 114/54 98.1  F (36.7  C) Oral 18     Constitutional:   awake, alert, cooperative, no apparent distress, and appears stated age   Abdomen:   Gravid, soft, NT, no palpable contractions.    Musculoskeletal:   Trace edema LE bilaterally           Data:   All laboratory data reviewed  All imaging studies reviewed by me.    Component      Latest Ref Rng 6/11/2024  10:07 AM 6/11/2024  12:36 PM 6/11/2024  2:36 PM 6/11/2024  5:08 PM   Sodium      135 - 145 mmol/L 138       Potassium      3.4 - 5.3 mmol/L 3.8       Carbon Dioxide (CO2)      22 - 29 mmol/L 22       Anion Gap      7 - 15 mmol/L 11       Urea Nitrogen      6.0 - 20.0 mg/dL 4.9 (L)       Creatinine      0.51 - 0.95 mg/dL 0.59       GFR Estimate      >60 mL/min/1.73m2 >90       Calcium      8.6 - 10.0 mg/dL 8.6       Chloride      98 - 107 mmol/L 105       Glucose      70 - 99 mg/dL 117 (H)       Alkaline Phosphatase      40 - 150 U/L 85       AST      0 - 45 U/L 10       ALT      0 - 50 U/L 6       Protein Total      6.4 - 8.3 g/dL 6.1 (L)       Albumin      3.5 - 5.2 g/dL 3.2 (L)       Bilirubin Total      <=1.2 mg/dL 0.2       WBC      4.0 - 11.0 10e3/uL 12.7 (H)       RBC Count      3.80 - 5.20 10e6/uL 3.24 (L)       Hemoglobin      11.7 - 15.7 g/dL 8.2 (L)       Hematocrit      35.0 - 47.0 % 26.8 (L)       MCV      78 - 100 fL 83       MCH      26.5 - 33.0 pg 25.3 (L)        MCHC      31.5 - 36.5 g/dL 30.6 (L)       RDW      10.0 - 15.0 % 19.6 (H)       Platelet Count      150 - 450 10e3/uL 311       INR      0.85 - 1.15  1.09       PTT      22 - 38 Seconds 24       Fibrinogen      170 - 490 mg/dL 505 (H)       GLUCOSE BY METER POCT      70 - 99 mg/dL  87  159 (H)  162 (H)       Legend:  (L) Low  (H) High

## 2024-06-11 NOTE — PROGRESS NOTES
Dr Horner modified orders per increased glucose results. MD discussed POC with patient, verbalized understanding, questions and concerns answered. Patients  and son have been visiting this evening. Patient requesting qshift assessment including vitals and NST to be done after they leave this evening. Will pass on to following shift that evening Vitals and NST need to be performed.

## 2024-06-11 NOTE — PROGRESS NOTES
In to greet patient this morning, patient doing well, no complaints of labor, positive fetal moments. Stated bleeding has been scant overnight. Ordering breakfast now, fasting glucose this am 89 not requiring additional insulin along with breakfast carb count.    Residents to manage diabetes management. Patient aware and will update RN when breakfast comes for carb count and insulin amount with meals. Continue POC of pre meal check then using sliding scale along with 1hr post prandial check with goal of being under 140 at that time. Discussed second betamethasone injection to be given at 1100 and will continue monitoring for increase in glucose due to steroid injection.    Dr Adhikari roundmilad for OB and updated on patient status this AM. POC to remain inpatient due to bleeding through 48hrs. Continue POC for 2nd Betamethasone injection and qshift NST and vitals. POC then to update Dr Robert CEJA with FATOU[facility] consult results.     MFM to come perform US around 1145 and consult for POC for patient at that time.    Patient verbalized understanding of POC and anticipated stay through tomorrow along with the 3 teams consulting/managing today, FATOUMATA, BRENNA, Residents.

## 2024-06-11 NOTE — UTILIZATION REVIEW
Admission Status; Secondary Review CONDITIONAL Determination   Under the authority of the Utilization Management Committee, the utilization review process indicated a secondary review on the above patient. The review outcome is based on review of the medical records, discussions with staff, and applying clinical experience noted on the date of the review.   (x) Inpatient Status Appropriate - This patient's medical care is consistent with medical management for inpatient care and reasonable inpatient medical practice.   RATIONALE FOR DETERMINATION   Ms. Albert is a 40 yo female who is currently  pregnant at 29w4d who presents to the ED with acute vaginal bleeding.  Has h/o of insulin-dependent DM; continues to have variable blood sugars while requiring to be NPO and with bethamethasone given.  Significant hypoglycemia noted after admission while NPO;  hospitalist consult required for insulin management.  MFM consult and specialized ultrasound imagingrequested and is pending.  Primary physician is recommending continued inpatient hospital stay with close clinical monitoring for 48 hours d/t high risk vaginal bleeding and potential need to urgent .  If she clinically is stable tonight and is able to discharge from the hospital; then would recommend changing to OBSERVATION status.    At the time of admission with the information available to the attending physician more than 2 nights Hospital complex care was anticipated, based on patient risk of adverse outcome if treated as outpatient and complex care required. Inpatient admission is appropriate based on the Medicare guidelines.   The information on this document is developed by the utilization review team in order for the business office to ensure compliance. This only denotes the appropriateness of proper admission status and does not reflect the quality of care rendered.   The definitions of Inpatient Status and Observation Status used in making the  determination above are those provided in the CMS Coverage Manual, Chapter 1 and Chapter 6, section 70.4.     Sincerely,     Ashley Ngo, DO  Utilization Review  Physician Advisor

## 2024-06-11 NOTE — PROGRESS NOTES
"To room as IHOB For SSE.  Patient is a 39 year old  admitted for vaginal bleeding.      S: Patient resting in bed this morning.  Reports small amount of dark red blood this morning.  Has had her MFM US and reports understanding of the plan of car    O:  /54   Pulse 110   Temp 98.1  F (36.7  C) (Oral)   Resp 18   Ht 1.6 m (5' 3\")   Wt 119.3 kg (263 lb)   LMP 2023 (Exact Date)   SpO2 98%   BMI 46.59 kg/m      SSE: Cervix visualized, visually closed.  No active bleeding noted but dark red blood present at the cervix.      A/P:  IUP at 29 weeks  Admitted for vaginal bleeding, found to have abruption on US today  Discussed plan for observation until 72 hours without bleeding.  If meets discharge criteria would need close outpatient follow-up with weekly BPP's and serial growth US.  Delivery planned via CS due to prior myomectomy  Appreciate  residency service in [facility] for insulin management in the setting of betamethasone administration.    MD Aleyda  "

## 2024-06-11 NOTE — PROGRESS NOTES
1hr post prandial after lunch glucose result of 159. Dr Horner in unit and updated on results. No change in POC, continue with pre dinner glucose test and adjust insulen per orders as needed.

## 2024-06-11 NOTE — PROGRESS NOTES
"Melissa Albert is a 39 year old female admitted on 6/10/2024. She is a  at 29w3d with history of type 2 diabetes on insulin presenting with vaginal bleeding.  Resident team consulted to help manage insulin.     Insulin-dependent type 2 diabetes   Patient follows with endocrinology in outpatient setting, last seen on 2024 and then by DM educator .  Prior to admission insulin regimen is 130 units of Lantus at bedtime and 1:5 per carb with meals then sliding scale insulin 2 hours postprandial and with bedtime.  Her A1c on admission is 5.6.  Patient did have 1 episode of hypoglycemia on day of admission that resolved with glucose.  She was also given betamethasone given concerns for  delivery.     Per Dr. Proctor [facility] guidelines \"Strategies for inpatient management of  diabetes.\"     Key principles  1.) continue home diabetes management upon admission if delivery is not immediately anticipated  2.) Adjust insulin in response to changing clinical situations (provide additional insulin if betamethasone is given during admission)  3.) Betamethasone and terbutaline can cause hyperglycemia    When steroids are given...  There can be considerable variation in individual patients, but one strategy is to preemptively increase basal/bolus insulin and add sliding scale correction prior to meals.  One algorithm mentioned in the guidelines proposes starting by increasing nighttime insulin by 25%    Taking our patient into consideration, she is already on a significant dose of nighttime lantus (130 units), and her sugars have been controlled so far on her home dose 23 hours after her first dose of betamethasone.  Assuming her second dose of betamethasone is given today, we should increase her nighttime lantus dose tonight.  [facility] does plan to see her today.    Plan:  -Antepartum insulin orderset              -glucose checks before meals and one hour postprandial  -Lantus 130 units at bedtime increase to 75 " Units at bedtime and 75 units in the morning on day 2  -NovoLog 1:5 units per carb with meals increase to 1:3 units to carb on day 2  -High resistance sliding scale prior to meals

## 2024-06-11 NOTE — PLAN OF CARE
"  Problem: Adult Inpatient Plan of Care  Goal: Patient-Specific Goal (Individualized)  Description: You can add care plan individualizations to a care plan. Examples of Individualization might be:  \"Parent requests to be called daily at 9am for status\", \"I have a hard time hearing out of my right ear\", or \"Do not touch me to wake me up as it startles  me\".  Outcome: Progressing     Problem: Adult Inpatient Plan of Care  Goal: Absence of Hospital-Acquired Illness or Injury  Outcome: Progressing   Goal Outcome Evaluation:         Patient will remain stable antepartum and free from bleeding inpatient monitored for 72hrs post bleed               "

## 2024-06-12 LAB
ANION GAP SERPL CALCULATED.3IONS-SCNC: 11 MMOL/L (ref 7–15)
ANION GAP SERPL CALCULATED.3IONS-SCNC: 13 MMOL/L (ref 7–15)
APTT PPP: 23 SECONDS (ref 22–38)
B-OH-BUTYR SERPL-SCNC: <0.18 MMOL/L
BLD PROD TYP BPU: NORMAL
BLD PROD TYP BPU: NORMAL
BLOOD COMPONENT TYPE: NORMAL
BLOOD COMPONENT TYPE: NORMAL
BUN SERPL-MCNC: 5.6 MG/DL (ref 6–20)
BUN SERPL-MCNC: 8.1 MG/DL (ref 6–20)
CALCIUM SERPL-MCNC: 8.1 MG/DL (ref 8.6–10)
CALCIUM SERPL-MCNC: 8.5 MG/DL (ref 8.6–10)
CHLORIDE SERPL-SCNC: 103 MMOL/L (ref 98–107)
CHLORIDE SERPL-SCNC: 105 MMOL/L (ref 98–107)
CODING SYSTEM: NORMAL
CODING SYSTEM: NORMAL
CREAT SERPL-MCNC: 0.45 MG/DL (ref 0.51–0.95)
CREAT SERPL-MCNC: 0.55 MG/DL (ref 0.51–0.95)
CROSSMATCH: NORMAL
CROSSMATCH: NORMAL
DEPRECATED HCO3 PLAS-SCNC: 21 MMOL/L (ref 22–29)
DEPRECATED HCO3 PLAS-SCNC: 23 MMOL/L (ref 22–29)
EGFRCR SERPLBLD CKD-EPI 2021: >90 ML/MIN/1.73M2
EGFRCR SERPLBLD CKD-EPI 2021: >90 ML/MIN/1.73M2
ERYTHROCYTE [DISTWIDTH] IN BLOOD BY AUTOMATED COUNT: 19.6 % (ref 10–15)
ERYTHROCYTE [DISTWIDTH] IN BLOOD BY AUTOMATED COUNT: 20.1 % (ref 10–15)
FIBRINOGEN PPP-MCNC: 426 MG/DL (ref 170–490)
GLUCOSE BLDC GLUCOMTR-MCNC: 103 MG/DL (ref 70–99)
GLUCOSE BLDC GLUCOMTR-MCNC: 124 MG/DL (ref 70–99)
GLUCOSE BLDC GLUCOMTR-MCNC: 66 MG/DL (ref 70–99)
GLUCOSE BLDC GLUCOMTR-MCNC: 74 MG/DL (ref 70–99)
GLUCOSE BLDC GLUCOMTR-MCNC: 81 MG/DL (ref 70–99)
GLUCOSE BLDC GLUCOMTR-MCNC: 83 MG/DL (ref 70–99)
GLUCOSE BLDC GLUCOMTR-MCNC: 84 MG/DL (ref 70–99)
GLUCOSE BLDC GLUCOMTR-MCNC: 85 MG/DL (ref 70–99)
GLUCOSE BLDC GLUCOMTR-MCNC: 88 MG/DL (ref 70–99)
GLUCOSE BLDC GLUCOMTR-MCNC: 92 MG/DL (ref 70–99)
GLUCOSE BLDC GLUCOMTR-MCNC: 96 MG/DL (ref 70–99)
GLUCOSE SERPL-MCNC: 83 MG/DL (ref 70–99)
GLUCOSE SERPL-MCNC: 83 MG/DL (ref 70–99)
HCT VFR BLD AUTO: 24.1 % (ref 35–47)
HCT VFR BLD AUTO: 25.8 % (ref 35–47)
HGB BLD-MCNC: 7.4 G/DL (ref 11.7–15.7)
HGB BLD-MCNC: 7.9 G/DL (ref 11.7–15.7)
INR PPP: 1.02 (ref 0.85–1.15)
MCH RBC QN AUTO: 25.6 PG (ref 26.5–33)
MCH RBC QN AUTO: 25.7 PG (ref 26.5–33)
MCHC RBC AUTO-ENTMCNC: 30.6 G/DL (ref 31.5–36.5)
MCHC RBC AUTO-ENTMCNC: 30.7 G/DL (ref 31.5–36.5)
MCV RBC AUTO: 83 FL (ref 78–100)
MCV RBC AUTO: 84 FL (ref 78–100)
PLATELET # BLD AUTO: 267 10E3/UL (ref 150–450)
PLATELET # BLD AUTO: 301 10E3/UL (ref 150–450)
POTASSIUM SERPL-SCNC: 3.6 MMOL/L (ref 3.4–5.3)
POTASSIUM SERPL-SCNC: 3.6 MMOL/L (ref 3.4–5.3)
RBC # BLD AUTO: 2.89 10E6/UL (ref 3.8–5.2)
RBC # BLD AUTO: 3.07 10E6/UL (ref 3.8–5.2)
SODIUM SERPL-SCNC: 137 MMOL/L (ref 135–145)
SODIUM SERPL-SCNC: 139 MMOL/L (ref 135–145)
UNIT ABO/RH: NORMAL
UNIT ABO/RH: NORMAL
UNIT NUMBER: NORMAL
UNIT NUMBER: NORMAL
UNIT STATUS: NORMAL
UNIT STATUS: NORMAL
UNIT TYPE ISBT: 6200
UNIT TYPE ISBT: 6200
WBC # BLD AUTO: 12.4 10E3/UL (ref 4–11)
WBC # BLD AUTO: 14.1 10E3/UL (ref 4–11)

## 2024-06-12 PROCEDURE — 36415 COLL VENOUS BLD VENIPUNCTURE: CPT | Performed by: STUDENT IN AN ORGANIZED HEALTH CARE EDUCATION/TRAINING PROGRAM

## 2024-06-12 PROCEDURE — 250N000013 HC RX MED GY IP 250 OP 250 PS 637: Performed by: OBSTETRICS & GYNECOLOGY

## 2024-06-12 PROCEDURE — 85384 FIBRINOGEN ACTIVITY: CPT | Performed by: OBSTETRICS & GYNECOLOGY

## 2024-06-12 PROCEDURE — 85027 COMPLETE CBC AUTOMATED: CPT | Performed by: OBSTETRICS & GYNECOLOGY

## 2024-06-12 PROCEDURE — 82010 KETONE BODYS QUAN: CPT | Performed by: OBSTETRICS & GYNECOLOGY

## 2024-06-12 PROCEDURE — 85018 HEMOGLOBIN: CPT | Performed by: STUDENT IN AN ORGANIZED HEALTH CARE EDUCATION/TRAINING PROGRAM

## 2024-06-12 PROCEDURE — 36415 COLL VENOUS BLD VENIPUNCTURE: CPT | Performed by: OBSTETRICS & GYNECOLOGY

## 2024-06-12 PROCEDURE — 120N000001 HC R&B MED SURG/OB

## 2024-06-12 PROCEDURE — 80048 BASIC METABOLIC PNL TOTAL CA: CPT | Performed by: OBSTETRICS & GYNECOLOGY

## 2024-06-12 PROCEDURE — 85730 THROMBOPLASTIN TIME PARTIAL: CPT | Performed by: OBSTETRICS & GYNECOLOGY

## 2024-06-12 PROCEDURE — 85610 PROTHROMBIN TIME: CPT | Performed by: OBSTETRICS & GYNECOLOGY

## 2024-06-12 PROCEDURE — 80048 BASIC METABOLIC PNL TOTAL CA: CPT | Performed by: STUDENT IN AN ORGANIZED HEALTH CARE EDUCATION/TRAINING PROGRAM

## 2024-06-12 PROCEDURE — 250N000013 HC RX MED GY IP 250 OP 250 PS 637: Performed by: STUDENT IN AN ORGANIZED HEALTH CARE EDUCATION/TRAINING PROGRAM

## 2024-06-12 RX ADMIN — ACETAMINOPHEN 650 MG: 325 TABLET ORAL at 18:25

## 2024-06-12 RX ADMIN — SENNOSIDES AND DOCUSATE SODIUM 1 TABLET: 8.6; 5 TABLET ORAL at 22:55

## 2024-06-12 RX ADMIN — SENNOSIDES AND DOCUSATE SODIUM 1 TABLET: 8.6; 5 TABLET ORAL at 10:01

## 2024-06-12 RX ADMIN — ASPIRIN 81 MG CHEWABLE TABLET 81 MG: 81 TABLET CHEWABLE at 22:54

## 2024-06-12 RX ADMIN — PRENATAL VIT W/ FE FUMARATE-FA TAB 27-0.8 MG 1 TABLET: 27-0.8 TAB at 10:00

## 2024-06-12 ASSESSMENT — ACTIVITIES OF DAILY LIVING (ADL)
ADLS_ACUITY_SCORE: 20

## 2024-06-12 NOTE — PROGRESS NOTES
Pt arouses.States it was a difficult night due to difficulty tracing for EFM for an extended period of time.  Pt discusses plan of care for today, and POCT/Insulin regulation.   Pt states her toddler is coming to visit this morning.     Denies any bleeding or cramping since yesterday 1400.     Karen J Schoenberg, RN

## 2024-06-12 NOTE — PROGRESS NOTES
7:07 PM   Following blood sugars closely this evening in setting of 2nd betamethasone dose given today. 1 hr postprandial glucose elevated to 154 (goal <140).     -Give evening 75 units lantus now   -Plan to recheck BG at 10pm and 2am and correct with sliding scale   -If sugars stay persistently high overnight would collect DKA labs  (BMP, Ketones)    Moses Berrios MD, PGY3  [facility]

## 2024-06-12 NOTE — PROVIDER NOTIFICATION
06/11/24 0352   Provider Notification   Provider Name/Title Dr. Jones   Method of Notification Phone   Request Evaluate - Remote     MD updated on FHR tracing, pt placed on U/S at 0200 for NST, FHR very active unable to continuously trace, multiple RN's at bedside adjusting monitor. Mostly recently continuous tracing obtained for 20 min, moderate variability and no decels but no 15x15 accels which FHR has demonstrated. Writer requests to take pt off monitor.     OK to take pt off monitor per MD.

## 2024-06-12 NOTE — PROGRESS NOTES
"Melisas Albert is a 39 year old female admitted on 6/10/2024. She is a  at 29w3d with history of type 2 diabetes on insulin presenting with vaginal bleeding.  Resident team consulted to help manage insulin.      Insulin-dependent type 2 diabetes   Patient follows with endocrinology in outpatient setting, last seen on 2024 and then by DM educator .  Prior to admission insulin regimen is 130 units of Lantus at bedtime and 1:5 per carb with meals then sliding scale insulin 2 hours postprandial and with bedtime.  Her A1c on admission is 5.6.  Patient did have 1 episode of hypoglycemia on day of admission that resolved with glucose.  She was also given betamethasone given concerns for  delivery.      Per Dr. Proctor [facility] guidelines \"Strategies for inpatient management of  diabetes.\"      Key principles  1.) continue home diabetes management upon admission if delivery is not immediately anticipated  2.) Adjust insulin in response to changing clinical situations (provide additional insulin if betamethasone is given during admission)  3.) Betamethasone and terbutaline can cause hyperglycemia     When steroids are given...  There can be considerable variation in individual patients, but one strategy is to preemptively increase basal/bolus insulin and add sliding scale correction prior to meals.  One algorithm mentioned in the guidelines proposes the following  Day 1: Increase nighttime insulin by 25%   Day 2: Increase all doses by 40%   Day 3: Increase all doses by 40%   Day 4: Increase all doses by 20%   Day 5: Increase all doses by 10%   Day 6-7: Return to baseline insulin         Plan:  -Antepartum insulin orderset              -glucose checks before meals and one hour postprandial  -Lantus 130 units day 1   -> 150 units (75 units evening  and 75 units  morning)     - if sugars are controlled today (), step down to 140 units (70 units evening  and 70 units morning )    - if " sugars are higher today, continue current regimen  -NovoLog 1:5 units per carb with meals increased to 1:3 units to carb on day 2   - consider stepping down if any episodes of hypoglycemia  -High resistance sliding scale prior to meals

## 2024-06-12 NOTE — PROGRESS NOTES
Pt NST hand held by RN for entire time. Fetus is active and accelerations noted. Reactive NST obtained. No abdominal cramping or tightening noted.   Pt aware that hgb had dropped this morning but she was not symptomatic with it being 7.4. Repeat hg due at 1700. Pt states she is starting to develop a headache and some tiredness, which she states is how she felt when she was severely anemic after her first . Pt states she will request blood possibly after that Hg evaluated.     Dr. Hendricks in [facility], notified of pt statements. Labs ordered. Plan of care discussed.   Karen J Schoenberg, RN

## 2024-06-12 NOTE — PROGRESS NOTES
[facility] Labor and Delivery Progress Note    Melissa Albert MRN# [id number]   Age: 39 year old YOB: 1985           Subjective:   Patient is tired but feeling well, denies any bleeding since yesterday afternoon.  Good fm.           Objective:   Patient Vitals for the past 24 hrs:   BP Temp Temp src Resp SpO2 Oximeter Heart Rate   24 0759 -- 98  F (36.7  C) Oral 18 -- --   24 0216 108/57 98.1  F (36.7  C) Oral 18 -- 90 bpm   24 -- -- -- -- 95 % 105 bpm   24 -- -- -- -- 94 % --   24 1934 138/64 98.3  F (36.8  C) Oral 20 -- 103 bpm   24 1129 114/54 98.1  F (36.7  C) Oral 18 -- 107 bpm       Alert, O x 3  Resp non labored  Ab gravid  Ext not examined    Hb 7.4 from 8.2        Assessment:   Melissa Albert is a 39 year old  who is 29w5d here with marginal placental abruption, anemia, history of myomectomy, type 2 DM and obesity, currently stable in terms of visible clinical bleeding, slight drop in hemoglobin from 8.2 to 7.4          Plan:   Continue active type and screen and saline lock, patient has had blood transfusion in past with c/s and myomectomy and would be ok if her symptoms worsen or hb less than 7, will repeat this pm.  Did get dose of iron dextran on admit, would be an option to repeat in 3-4 weeks per pharmacy.  S/p Beta x 2, will need magnesium for neuroprotection if delivery less than 32 weeks.  Plan is for in house management until 72 hours without bleeding than possible home.  Currently on SCD for DVT prophylaxis, if hemodynamically stable and longer admit needed, consider lovenox. Appreciate family medicine following for insulin adjustements in setting of type 2 DM and betamethasone course.  Appreciate MFM consult- detailed note from Dr. Bird providing recommendations.  One hour of monitoring TID, continuous if clinical status warrants.  Weekly BPP until 32 weeks then twice weekly.        Stefanie Sultana MD

## 2024-06-12 NOTE — PROGRESS NOTES
Pt family visited for a couple hours. Pt is up and about in the room and has no complaints of bleeding , dizziness, or cramping.     Will call Rn when ready for pre meal POCT.   Karen J Schoenberg, RN

## 2024-06-12 NOTE — PROVIDER NOTIFICATION
06/11/24 2000   Fetal Assessment   Fetal Movement active   Fetal HR Assessment Method external US   Fetal HR (beats/min) 150   Fetal HR Baseline normal range  (FHR remains very active, broken tracing, RN at bedside adjusting monitor, FHR audible; MHR tracing at times in the 100's, SpO2 moitor placed to confirm)   Fetal HR Variability moderate (amplitude range 6 to 25 bpm)   Fetal HR Accelerations absent   Fetal HR Decelerations absent   RN Strip Review RN Reviewed Strip q 5

## 2024-06-12 NOTE — PLAN OF CARE
Problem: Antepartum Bleeding  Goal: Absence of Bleeding  6/12/2024 0648 by Marissa Scott, RN  Outcome: Progressing  6/12/2024 0647 by Marissa Scott, RN  Outcome: Progressing   Goal Outcome Evaluation:    VSS. Pt denies any bleeding since 1400 yesterday, reports no pain/ctx. HS, 0200 and fasting BG WDL.  and son went home for the night. Pt able to sleep some overnight with vistaril.

## 2024-06-12 NOTE — PLAN OF CARE
"  Problem: Antepartum Bleeding  Goal: Absence of Bleeding  Outcome: Progressing     Problem: Adult Inpatient Plan of Care  Goal: Plan of Care Review  Description: The Plan of Care Review/Shift note should be completed every shift.  The Outcome Evaluation is a brief statement about your assessment that the patient is improving, declining, or no change.  This information will be displayed automatically on your shift  note.  Outcome: Progressing  Goal: Patient-Specific Goal (Individualized)  Description: You can add care plan individualizations to a care plan. Examples of Individualization might be:  \"Parent requests to be called daily at 9am for status\", \"I have a hard time hearing out of my right ear\", or \"Do not touch me to wake me up as it startles  me\".  Outcome: Progressing  Goal: Absence of Hospital-Acquired Illness or Injury  Outcome: Progressing  Intervention: Prevent and Manage VTE (Venous Thromboembolism) Risk  Recent Flowsheet Documentation  Taken 6/12/2024 0759 by Schoenberg, Karen J, RN  VTE Prevention/Management: SCDs (sequential compression devices) on  Goal: Optimal Comfort and Wellbeing  Outcome: Progressing  Intervention: Provide Person-Centered Care  Recent Flowsheet Documentation  Taken 6/12/2024 0759 by Schoenberg, Karen J, RN  Trust Relationship/Rapport:   care explained   choices provided  Goal: Readiness for Transition of Care  Outcome: Progressing   Goal Outcome Evaluation:         No bleeding noted today. Pt is not symptomatic with anemic level at 7.4. Do not plan transfusion until drops below 7.0.   Pt plan of care for blood glucose monitoring and insulin dosages appear to keep BG WNL .   Pt is cheerful and comfortable with plan of care.   Karen J Schoenberg, RN                 "

## 2024-06-12 NOTE — PROGRESS NOTES
Pt up and about in room without dizziness. Family visits again.   Await 1700 labs.   Karen J Schoenberg, RN

## 2024-06-12 NOTE — PROGRESS NOTES
Pt states dull headache remains. Hgb up to 7.9. No dizziness or lethargy.   VS WNL.   Tylenol per pt request.   Plan of care for next shift discussed, as well as insulin changes for the p.m.  Karen J Schoenberg, RN

## 2024-06-13 LAB
ANION GAP SERPL CALCULATED.3IONS-SCNC: 11 MMOL/L (ref 7–15)
BUN SERPL-MCNC: 6.4 MG/DL (ref 6–20)
CALCIUM SERPL-MCNC: 8 MG/DL (ref 8.6–10)
CHLORIDE SERPL-SCNC: 104 MMOL/L (ref 98–107)
CREAT SERPL-MCNC: 0.5 MG/DL (ref 0.51–0.95)
DEPRECATED HCO3 PLAS-SCNC: 23 MMOL/L (ref 22–29)
EGFRCR SERPLBLD CKD-EPI 2021: >90 ML/MIN/1.73M2
ERYTHROCYTE [DISTWIDTH] IN BLOOD BY AUTOMATED COUNT: 20.5 % (ref 10–15)
GLUCOSE BLDC GLUCOMTR-MCNC: 104 MG/DL (ref 70–99)
GLUCOSE BLDC GLUCOMTR-MCNC: 108 MG/DL (ref 70–99)
GLUCOSE BLDC GLUCOMTR-MCNC: 49 MG/DL (ref 70–99)
GLUCOSE BLDC GLUCOMTR-MCNC: 73 MG/DL (ref 70–99)
GLUCOSE BLDC GLUCOMTR-MCNC: 85 MG/DL (ref 70–99)
GLUCOSE BLDC GLUCOMTR-MCNC: 88 MG/DL (ref 70–99)
GLUCOSE BLDC GLUCOMTR-MCNC: 88 MG/DL (ref 70–99)
GLUCOSE BLDC GLUCOMTR-MCNC: 89 MG/DL (ref 70–99)
GLUCOSE BLDC GLUCOMTR-MCNC: 91 MG/DL (ref 70–99)
GLUCOSE BLDC GLUCOMTR-MCNC: 94 MG/DL (ref 70–99)
GLUCOSE SERPL-MCNC: 79 MG/DL (ref 70–99)
HCT VFR BLD AUTO: 27.2 % (ref 35–47)
HGB BLD-MCNC: 8 G/DL (ref 11.7–15.7)
MCH RBC QN AUTO: 25.3 PG (ref 26.5–33)
MCHC RBC AUTO-ENTMCNC: 29.4 G/DL (ref 31.5–36.5)
MCV RBC AUTO: 86 FL (ref 78–100)
PLATELET # BLD AUTO: 286 10E3/UL (ref 150–450)
POTASSIUM SERPL-SCNC: 3.5 MMOL/L (ref 3.4–5.3)
RBC # BLD AUTO: 3.16 10E6/UL (ref 3.8–5.2)
SODIUM SERPL-SCNC: 138 MMOL/L (ref 135–145)
WBC # BLD AUTO: 11.8 10E3/UL (ref 4–11)

## 2024-06-13 PROCEDURE — 85027 COMPLETE CBC AUTOMATED: CPT | Performed by: STUDENT IN AN ORGANIZED HEALTH CARE EDUCATION/TRAINING PROGRAM

## 2024-06-13 PROCEDURE — 250N000013 HC RX MED GY IP 250 OP 250 PS 637: Performed by: OBSTETRICS & GYNECOLOGY

## 2024-06-13 PROCEDURE — 120N000001 HC R&B MED SURG/OB

## 2024-06-13 PROCEDURE — 80048 BASIC METABOLIC PNL TOTAL CA: CPT | Performed by: STUDENT IN AN ORGANIZED HEALTH CARE EDUCATION/TRAINING PROGRAM

## 2024-06-13 PROCEDURE — 250N000013 HC RX MED GY IP 250 OP 250 PS 637: Performed by: STUDENT IN AN ORGANIZED HEALTH CARE EDUCATION/TRAINING PROGRAM

## 2024-06-13 PROCEDURE — 250N000012 HC RX MED GY IP 250 OP 636 PS 637

## 2024-06-13 PROCEDURE — 36415 COLL VENOUS BLD VENIPUNCTURE: CPT | Performed by: STUDENT IN AN ORGANIZED HEALTH CARE EDUCATION/TRAINING PROGRAM

## 2024-06-13 RX ADMIN — SENNOSIDES AND DOCUSATE SODIUM 1 TABLET: 8.6; 5 TABLET ORAL at 08:47

## 2024-06-13 RX ADMIN — ASPIRIN 81 MG CHEWABLE TABLET 81 MG: 81 TABLET CHEWABLE at 21:53

## 2024-06-13 RX ADMIN — SENNOSIDES AND DOCUSATE SODIUM 1 TABLET: 8.6; 5 TABLET ORAL at 21:53

## 2024-06-13 RX ADMIN — HYDROXYZINE HYDROCHLORIDE 100 MG: 50 TABLET ORAL at 00:00

## 2024-06-13 RX ADMIN — PRENATAL VIT W/ FE FUMARATE-FA TAB 27-0.8 MG 1 TABLET: 27-0.8 TAB at 08:47

## 2024-06-13 RX ADMIN — HYDROXYZINE HYDROCHLORIDE 100 MG: 50 TABLET ORAL at 21:54

## 2024-06-13 RX ADMIN — INSULIN GLARGINE 70 UNITS: 100 INJECTION, SOLUTION SUBCUTANEOUS at 00:03

## 2024-06-13 ASSESSMENT — ACTIVITIES OF DAILY LIVING (ADL)
ADLS_ACUITY_SCORE: 20

## 2024-06-13 NOTE — PROGRESS NOTES
Postprandial BG is 88. Pt up to shower, but encouraged snack in a while.  Karen J Schoenberg, RN

## 2024-06-13 NOTE — PROGRESS NOTES
"Running low BG this am. Noc Rn gave juice. Subsequent BG 90\"s. Ob Resident Md notified that we are holding all insulin until further order assessments done.   Pt orders breakfast.   No bleeding since 6/11 1400.  Karen J Schoenberg, RN    "

## 2024-06-13 NOTE — PROGRESS NOTES
POCT now 73. Watching trend downward post meal. Dr. Sanchez at bedside discussing insulin plan of care. Pt encouraged to have snack immediately. She is in agreement. Family to visit at bedside.   Karen J Schoenberg, RN

## 2024-06-13 NOTE — PROGRESS NOTES
Hand held NST. Infant with many movements and clearly audible accelerations. NST reactive within 15 min.  Pt did not sleep well last noc due to Rn attempts at continuous EFM tracing, thus interrupting sleep.     Pt settles to take nap after this assessment and BG POCT completed.  Told her to page RN upon waking.     Karen J Schoenberg, RN

## 2024-06-13 NOTE — PLAN OF CARE
Goal Outcome Evaluation:      Plan of Care Reviewed With: patient    Overall Patient Progress: improvingOverall Patient Progress: improving    Outcome Evaluation: Able to make needs known. VSS on room air. up independently. denies pain. active fetal movement. low BG of 66, treated with cranberry juice.

## 2024-06-13 NOTE — PROGRESS NOTES
Dr. Marcial notified of patient's low blood sugar. Orders received to hold both long acting and slide scale insulin. Resident will round on patient and reevaluate.

## 2024-06-13 NOTE — PROGRESS NOTES
"Melissa Albert is a 39 year old female admitted on 6/10/2024. She is a  at 29w3d with history of type 2 diabetes on insulin presenting with vaginal bleeding.  Resident team consulted to help manage insulin.      Insulin-dependent type 2 diabetes   Patient follows with endocrinology in outpatient setting, last seen on 2024 and then by DM educator .  Prior to admission insulin regimen is 130 units of Lantus at bedtime and 1:5 per carb with meals then sliding scale insulin 2 hours postprandial and with bedtime.  Her A1c on admission is 5.6.  Patient did have 1 episode of hypoglycemia on day of admission that resolved with glucose.  She was also given betamethasone given concerns for  delivery.      Per Dr. Proctor [facility] guidelines \"Strategies for inpatient management of  diabetes.\"      Key principles  1.) continue home diabetes management upon admission if delivery is not immediately anticipated  2.) Adjust insulin in response to changing clinical situations (provide additional insulin if betamethasone is given during admission)  3.) Betamethasone and terbutaline can cause hyperglycemia     When steroids are given...  There can be considerable variation in individual patients, but one strategy is to preemptively increase basal/bolus insulin and add sliding scale correction prior to meals.  One algorithm mentioned in the guidelines proposes the following  Day 1: Increase nighttime insulin by 25%   Day 2: Increase all doses by 40%   Day 3: Increase all doses by 40%   Day 4: Increase all doses by 20%   Day 5: Increase all doses by 10%   Day 6-7: Return to baseline insulin      Taking our patient into consideration, she is already on a significant dose of nighttime lantus (130 units), so we were more modest in our increase.     Plan:  -Antepartum insulin orderset              -glucose checks before meals and one hour postprandial  -Lantus 130 units day 1              -> stepped up to 150 units (75 " units evening 6/12 and 75 units 6/13 morning)                           -> step down to 140 units (70 units evening 6/12)                           -> stepped down to PTA dose 130 units (65 units am 6/13, 65 units pm 6/13)  -NovoLog 1:5 units per carb with meals  -> increased to 1:3 units to carb on day 2   -> adjusted back to PTA ratio 1:5 on day 3 (6/12)  -High resistance sliding scale prior to meals

## 2024-06-13 NOTE — PLAN OF CARE
"  Problem: Adult Inpatient Plan of Care  Goal: Plan of Care Review  Description: The Plan of Care Review/Shift note should be completed every shift.  The Outcome Evaluation is a brief statement about your assessment that the patient is improving, declining, or no change.  This information will be displayed automatically on your shift  note.  Outcome: Progressing  Goal: Patient-Specific Goal (Individualized)  Description: You can add care plan individualizations to a care plan. Examples of Individualization might be:  \"Parent requests to be called daily at 9am for status\", \"I have a hard time hearing out of my right ear\", or \"Do not touch me to wake me up as it startles  me\".  Outcome: Progressing  Goal: Absence of Hospital-Acquired Illness or Injury  Outcome: Progressing  Intervention: Prevent and Manage VTE (Venous Thromboembolism) Risk  Recent Flowsheet Documentation  Taken 6/13/2024 0800 by Schoenberg, Karen J, RN  VTE Prevention/Management: (ambulating in room) compression stockings off  Goal: Optimal Comfort and Wellbeing  Outcome: Progressing  Goal: Readiness for Transition of Care  Outcome: Progressing     Problem: Antepartum Bleeding  Goal: Absence of Bleeding  Outcome: Progressing   Goal Outcome Evaluation:               BG trending lower today. Insulin dosages adjusted per MD. Pt without vaginal bleeding. Plans to discharge to home tomorrow, but would like a BPP prior to discharge as indicated by MFM previously.   Karen J Schoenberg, RN           "

## 2024-06-13 NOTE — PLAN OF CARE
Problem: Antepartum Bleeding  Goal: Absence of Bleeding  Outcome: Progressing   Goal Outcome Evaluation:         Melissa's vital signs are stable. No bleeding or leaking of fluid. Active fetal movement.

## 2024-06-13 NOTE — PROGRESS NOTES
Dr. Solomon on unit. Notified of lab reports.   Shift change report to Syeda MAHARAJ RN.   Karen J Schoenberg, RN

## 2024-06-13 NOTE — SIGNIFICANT EVENT
Significant Event Note    Time of event: 6:44 AM June 13, 2024    Description of event:  Paged for BG of 46  Patient with IDDM bu talso 29w3d undergoing betamethasone treatment for vaginal bleeding with risk for PPROM    Paged for low BG  Patient currently on a whopping dose of Lantus nightly (70 units) plus in AM (75 units) along with 1:5 carb count and sliding scale  Did receive insulin as scheduled last night, though reduced given BG of 85  BG this AM now at 49  Undergoing hypoglycemia protocol at the moment  Otherwise patient feels fine and vitals are stable per nurse      Plan:  Will hold AM Lantus and Novolog for now, continue with hypoglycemia protocol. Once BG comes up, likely can get morning dose of Lantus at some point. AM team to address.    Discussed with: bedside nurse    Laurie Marcial MD

## 2024-06-14 ENCOUNTER — ANCILLARY PROCEDURE (OUTPATIENT)
Dept: ULTRASOUND IMAGING | Facility: HOSPITAL | Age: 39
DRG: 832 | End: 2024-06-14
Attending: OBSTETRICS & GYNECOLOGY
Payer: COMMERCIAL

## 2024-06-14 VITALS
HEIGHT: 63 IN | HEART RATE: 113 BPM | OXYGEN SATURATION: 97 % | RESPIRATION RATE: 18 BRPM | BODY MASS INDEX: 46.78 KG/M2 | DIASTOLIC BLOOD PRESSURE: 74 MMHG | SYSTOLIC BLOOD PRESSURE: 132 MMHG | TEMPERATURE: 98.1 F | WEIGHT: 264 LBS

## 2024-06-14 LAB
ANION GAP SERPL CALCULATED.3IONS-SCNC: 10 MMOL/L (ref 7–15)
BUN SERPL-MCNC: 5.7 MG/DL (ref 6–20)
CALCIUM SERPL-MCNC: 8.2 MG/DL (ref 8.6–10)
CHLORIDE SERPL-SCNC: 104 MMOL/L (ref 98–107)
CREAT SERPL-MCNC: 0.53 MG/DL (ref 0.51–0.95)
DEPRECATED HCO3 PLAS-SCNC: 24 MMOL/L (ref 22–29)
EGFRCR SERPLBLD CKD-EPI 2021: >90 ML/MIN/1.73M2
ERYTHROCYTE [DISTWIDTH] IN BLOOD BY AUTOMATED COUNT: 21.2 % (ref 10–15)
GLUCOSE BLDC GLUCOMTR-MCNC: 102 MG/DL (ref 70–99)
GLUCOSE BLDC GLUCOMTR-MCNC: 105 MG/DL (ref 70–99)
GLUCOSE BLDC GLUCOMTR-MCNC: 113 MG/DL (ref 70–99)
GLUCOSE BLDC GLUCOMTR-MCNC: 64 MG/DL (ref 70–99)
GLUCOSE BLDC GLUCOMTR-MCNC: 66 MG/DL (ref 70–99)
GLUCOSE BLDC GLUCOMTR-MCNC: 88 MG/DL (ref 70–99)
GLUCOSE BLDC GLUCOMTR-MCNC: 97 MG/DL (ref 70–99)
GLUCOSE SERPL-MCNC: 98 MG/DL (ref 70–99)
HCT VFR BLD AUTO: 26.1 % (ref 35–47)
HGB BLD-MCNC: 8 G/DL (ref 11.7–15.7)
MCH RBC QN AUTO: 26.2 PG (ref 26.5–33)
MCHC RBC AUTO-ENTMCNC: 30.7 G/DL (ref 31.5–36.5)
MCV RBC AUTO: 86 FL (ref 78–100)
PLATELET # BLD AUTO: 230 10E3/UL (ref 150–450)
POTASSIUM SERPL-SCNC: 3.7 MMOL/L (ref 3.4–5.3)
RBC # BLD AUTO: 3.05 10E6/UL (ref 3.8–5.2)
SODIUM SERPL-SCNC: 138 MMOL/L (ref 135–145)
WBC # BLD AUTO: 10.4 10E3/UL (ref 4–11)

## 2024-06-14 PROCEDURE — 76819 FETAL BIOPHYS PROFIL W/O NST: CPT | Mod: 26 | Performed by: STUDENT IN AN ORGANIZED HEALTH CARE EDUCATION/TRAINING PROGRAM

## 2024-06-14 PROCEDURE — 80048 BASIC METABOLIC PNL TOTAL CA: CPT | Performed by: STUDENT IN AN ORGANIZED HEALTH CARE EDUCATION/TRAINING PROGRAM

## 2024-06-14 PROCEDURE — 76819 FETAL BIOPHYS PROFIL W/O NST: CPT

## 2024-06-14 PROCEDURE — G0463 HOSPITAL OUTPT CLINIC VISIT: HCPCS | Mod: 25

## 2024-06-14 PROCEDURE — 99232 SBSQ HOSP IP/OBS MODERATE 35: CPT | Mod: GC

## 2024-06-14 PROCEDURE — 36415 COLL VENOUS BLD VENIPUNCTURE: CPT | Performed by: STUDENT IN AN ORGANIZED HEALTH CARE EDUCATION/TRAINING PROGRAM

## 2024-06-14 PROCEDURE — 250N000013 HC RX MED GY IP 250 OP 250 PS 637: Performed by: OBSTETRICS & GYNECOLOGY

## 2024-06-14 PROCEDURE — 85027 COMPLETE CBC AUTOMATED: CPT | Performed by: STUDENT IN AN ORGANIZED HEALTH CARE EDUCATION/TRAINING PROGRAM

## 2024-06-14 RX ORDER — INSULIN LISPRO 100 [IU]/ML
1 INJECTION, SOLUTION INTRAVENOUS; SUBCUTANEOUS
Qty: 15 ML | Refills: 0 | Status: ON HOLD | OUTPATIENT
Start: 2024-06-14 | End: 2024-08-05

## 2024-06-14 RX ORDER — HYDROXYZINE PAMOATE 50 MG/1
50 CAPSULE ORAL 3 TIMES DAILY PRN
Qty: 30 CAPSULE | Refills: 0 | Status: SHIPPED | OUTPATIENT
Start: 2024-06-14 | End: 2024-10-02

## 2024-06-14 RX ADMIN — SENNOSIDES AND DOCUSATE SODIUM 1 TABLET: 8.6; 5 TABLET ORAL at 08:26

## 2024-06-14 RX ADMIN — PRENATAL VIT W/ FE FUMARATE-FA TAB 27-0.8 MG 1 TABLET: 27-0.8 TAB at 08:26

## 2024-06-14 ASSESSMENT — ACTIVITIES OF DAILY LIVING (ADL)
ADLS_ACUITY_SCORE: 20

## 2024-06-14 NOTE — PROGRESS NOTES
"Please see \"Imaging\" tab under \"Chart Review\" for details of today's visit.    Katie Retana    "

## 2024-06-14 NOTE — PROGRESS NOTES
Spoke with resident OB about pt declining rapid acting insulin d/t BS of 105 45 minutes after meal. Ok with this plan. Will check another BS before discharge. MD will do med rec for insulin orders. Plan is to discharge today.

## 2024-06-14 NOTE — PROGRESS NOTES
OB Resident in department updated on Fasting BS of 66 and correction BS of 97 after pt received juice. Verbal orders given to still give am dose of Lantus.

## 2024-06-14 NOTE — PROGRESS NOTES
Dr. Nelson called unit, updated on pt assessment. See new orders. Awaiting 1145 ultrasound with NST to follow. Will need to talk with OB resident about insulin management and medication needs.

## 2024-06-14 NOTE — PLAN OF CARE
Problem: Adult Inpatient Plan of Care  Goal: Optimal Comfort and Wellbeing  Outcome: Met  Intervention: Provide Person-Centered Care  Recent Flowsheet Documentation  Taken 6/14/2024 1635 by Ashley Darby RN  Trust Relationship/Rapport:   care explained   choices provided   emotional support provided   empathic listening provided   questions answered   questions encouraged   reassurance provided   thoughts/feelings acknowledged   Goal Outcome Evaluation:      Plan of Care Reviewed With: patient    Overall Patient Progress: improvingOverall Patient Progress: improving     Patient denies any pain.     Patient reports no vaginal bleeding in over 72 hours. No leaking of fluid.

## 2024-06-14 NOTE — PROGRESS NOTES
Verifying with OB Dr. Solomon it is okay for patient to discharge.  Do not see discharge note today but there is a discharge order in from Dr. Adhikari.

## 2024-06-14 NOTE — PROGRESS NOTES
Called MFM and verified BPP was done. DAMIEN Lee verified BPP was 8/8 when she did it.  Needs to place results in still.

## 2024-06-14 NOTE — PROGRESS NOTES
Patient said she saw Dr. Sultana this morning. Verified with Dr. Sultana and Rd said she saw her on Monday and Wednesday. Paged Dr. Solomon

## 2024-06-14 NOTE — PROGRESS NOTES
"BS 64 pt reports feeling symptomatic, stating she is feeling, \" shaky\" Pts lunch at bedside, declines wanting glucose gel and states she will just eat her food. Pt also receives Novolog with meals, will call MD about holding medication. Dr. Retana called and updated on pt condition. Ok for pt to hold off on glucose gel at this time. MD will look at orders to see plans for novolog management.    "

## 2024-06-14 NOTE — PROGRESS NOTES
Patient rested well throughout night, no complaints verbalized. Patient denies any bleeding, pain, cramping and reports +FM. Fasting B, pt asymptomatic and has no complaints. Pt given cranberry juice per request. Repeat B. RN at bedside to handhold for NST, reactive with audible accelerations noted. VSS, afebrile. Patient will be 72 hours post bleed today  at 1400. Patient desires to have an BPP today before she is discharged. RN to pass along in report.     0735 Report given to ANKUSH Crisostomo RN. Patient care relinquished at this time.

## 2024-06-14 NOTE — PROGRESS NOTES
Dr. Solomon called and Dr. Solomon had verified that Dr. Adhikari did actually see patient this morning since she placed discharge orders.  Dr. Adhikari was saying she did see her and will place discharge note as soon as she knew patient was discharging for sure. Dr. Solomon will update Dr. Adhikari

## 2024-06-14 NOTE — PROGRESS NOTES
Report rec'd from SHAVONNE Chowdhury RN at 1900, patient care assumed. Upon assessment patient denies bleeding, cramping and pain. Reports having no other concerns at this time. Reports +FM. Vitals signs WNL, afebrile. Handheld NST completed by writer and reactive, fetal movement present causing broken tracing at times, audible accelerations noted, no decelerations. No contractions present. Bedtime glucose: 89, no correction required, scheduled HS Lantus 65 units administered. Patient reports being tired after having a disruptive night last night, plan at this time is to promote rest and obtain fasting BG and NST in AM around 0600. Patient knows to call RN with any concerns.     Twan Chand RN

## 2024-06-14 NOTE — PROGRESS NOTES
"Melissa Albert is a 39 year old female admitted on 6/10/2024. She is a  at 29w3d with history of type 2 diabetes on insulin presenting with vaginal bleeding. Resident team consulted to help manage insulin.     Insulin-dependent type 2 diabetes   Patient follows with endocrinology in outpatient setting, last seen on 2024 and then by DM educator .  Prior to admission insulin regimen is 130 units of Lantus at bedtime and 1:5 per carb with meals then sliding scale insulin 2 hours postprandial and with bedtime.  Her A1c on admission is 5.6.  She was also given betamethasone given concerns for  delivery.  Her insulin was adjusted according to Dr. [facility] guidelines \"Strategies for inpatient management of  diabetes.\" We were more modest with our increases since her home dose was already very high.  Her sugars have been well controlled.      Plan:    She is currently on her home dose of lantus and mealtime insulin and can continue this until discharge.  Her prior to admission home insulin dose should be decreased to 120 units on discharge. In the hospital we have split her lantus into a morning and evening dose, but on discharge she can continue on 1X day, 120 units bedtime dose.    -Antepartum insulin orderset              -glucose checks before meals and one hour postprandial  -Lantus 130 units day 1              -> stepped up to 150 units (75 units evening  and 75 units  morning)                           -> step down to 140 units (70 units evening )                                      -> stepped down to PTA dose 130 units (65 units am , 65 units pm )  -NovoLog 1:5 units per carb with meals  -> increased to 1:3 units to carb on day 2              -> adjusted back to PTA ratio 1:5 on day 3 ()  -High resistance sliding scale prior to meals  "

## 2024-06-14 NOTE — PROGRESS NOTES
Data: Patient assessed in the [facility] for vaginal bleeding. See prior shift charting/notes for additional information from stay. No vaginal bleeding for over 72 hours per patient.    Action: Discharge instructions reviewed. Patient instructed to report change in fetal movement, vaginal leaking of fluid or bleeding, abdominal pain, or any concerns related to the pregnancy to provider/clinic.      Response: Orders to discharge home per Dr. Adhikari.. Patient verbalized understanding of education and agreement with plan. Discharged to home at 1700.  Patient walked out to meet her  at front entrance. Discharged with pharmacy medications that were couriered over. Patient to  short acting Humalog at [facility] prior to 6 pm.   Will follow up with clinic appointment on this next Thursday June 20th.

## 2024-06-29 ENCOUNTER — APPOINTMENT (OUTPATIENT)
Dept: ULTRASOUND IMAGING | Facility: HOSPITAL | Age: 39
DRG: 832 | End: 2024-06-29
Attending: OBSTETRICS & GYNECOLOGY
Payer: COMMERCIAL

## 2024-06-29 ENCOUNTER — HOSPITAL ENCOUNTER (INPATIENT)
Facility: HOSPITAL | Age: 39
LOS: 3 days | Discharge: HOME | DRG: 832 | End: 2024-07-02
Attending: OBSTETRICS & GYNECOLOGY | Admitting: OBSTETRICS & GYNECOLOGY
Payer: COMMERCIAL

## 2024-06-29 PROBLEM — Z36.89 ENCOUNTER FOR TRIAGE IN PREGNANT PATIENT: Status: ACTIVE | Noted: 2024-06-29

## 2024-06-29 PROBLEM — O20.9 BLEEDING IN EARLY PREGNANCY: Status: ACTIVE | Noted: 2024-06-29

## 2024-06-29 LAB
ABO/RH(D): NORMAL
ANTIBODY SCREEN: NEGATIVE
BASOPHILS # BLD AUTO: 0 10E3/UL (ref 0–0.2)
BASOPHILS NFR BLD AUTO: 0 %
EOSINOPHIL # BLD AUTO: 0.1 10E3/UL (ref 0–0.7)
EOSINOPHIL NFR BLD AUTO: 1 %
ERYTHROCYTE [DISTWIDTH] IN BLOOD BY AUTOMATED COUNT: 22.9 % (ref 10–15)
GLUCOSE BLDC GLUCOMTR-MCNC: 110 MG/DL (ref 70–99)
GLUCOSE BLDC GLUCOMTR-MCNC: 126 MG/DL (ref 70–99)
GLUCOSE BLDC GLUCOMTR-MCNC: 149 MG/DL (ref 70–99)
HCT VFR BLD AUTO: 32.8 % (ref 35–47)
HGB BLD-MCNC: 10.2 G/DL (ref 11.7–15.7)
HOLD SPECIMEN: NORMAL
IMM GRANULOCYTES # BLD: 0.2 10E3/UL
IMM GRANULOCYTES NFR BLD: 1 %
LYMPHOCYTES # BLD AUTO: 2.5 10E3/UL (ref 0.8–5.3)
LYMPHOCYTES NFR BLD AUTO: 21 %
MCH RBC QN AUTO: 26.8 PG (ref 26.5–33)
MCHC RBC AUTO-ENTMCNC: 31.1 G/DL (ref 31.5–36.5)
MCV RBC AUTO: 86 FL (ref 78–100)
MONOCYTES # BLD AUTO: 0.3 10E3/UL (ref 0–1.3)
MONOCYTES NFR BLD AUTO: 3 %
NEUTROPHILS # BLD AUTO: 9 10E3/UL (ref 1.6–8.3)
NEUTROPHILS NFR BLD AUTO: 75 %
NRBC # BLD AUTO: 0 10E3/UL
NRBC BLD AUTO-RTO: 0 /100
PLATELET # BLD AUTO: 313 10E3/UL (ref 150–450)
RBC # BLD AUTO: 3.8 10E6/UL (ref 3.8–5.2)
SPECIMEN EXPIRATION DATE: NORMAL
WBC # BLD AUTO: 12 10E3/UL (ref 4–11)

## 2024-06-29 PROCEDURE — 250N000013 HC RX MED GY IP 250 OP 250 PS 637: Performed by: OBSTETRICS & GYNECOLOGY

## 2024-06-29 PROCEDURE — 82962 GLUCOSE BLOOD TEST: CPT

## 2024-06-29 PROCEDURE — 76819 FETAL BIOPHYS PROFIL W/O NST: CPT

## 2024-06-29 PROCEDURE — 250N000012 HC RX MED GY IP 250 OP 636 PS 637: Performed by: OBSTETRICS & GYNECOLOGY

## 2024-06-29 PROCEDURE — 86900 BLOOD TYPING SEROLOGIC ABO: CPT | Performed by: OBSTETRICS & GYNECOLOGY

## 2024-06-29 PROCEDURE — 36415 COLL VENOUS BLD VENIPUNCTURE: CPT | Performed by: OBSTETRICS & GYNECOLOGY

## 2024-06-29 PROCEDURE — 120N000001 HC R&B MED SURG/OB

## 2024-06-29 PROCEDURE — 85025 COMPLETE CBC W/AUTO DIFF WBC: CPT | Performed by: OBSTETRICS & GYNECOLOGY

## 2024-06-29 RX ORDER — ONDANSETRON 4 MG/1
TABLET, ORALLY DISINTEGRATING ORAL
COMMUNITY
Start: 2024-01-04 | End: 2024-10-02

## 2024-06-29 RX ORDER — INSULIN LISPRO 100 [IU]/ML
1 INJECTION, SOLUTION INTRAVENOUS; SUBCUTANEOUS 4 TIMES DAILY
Status: DISCONTINUED | OUTPATIENT
Start: 2024-06-29 | End: 2024-06-29

## 2024-06-29 RX ORDER — SENNOSIDES 8.6 MG
8.6 TABLET ORAL 2 TIMES DAILY
Status: DISCONTINUED | OUTPATIENT
Start: 2024-06-29 | End: 2024-07-02 | Stop reason: HOSPADM

## 2024-06-29 RX ORDER — PROCHLORPERAZINE MALEATE 10 MG
10 TABLET ORAL EVERY 6 HOURS PRN
Status: DISCONTINUED | OUTPATIENT
Start: 2024-06-29 | End: 2024-07-02 | Stop reason: HOSPADM

## 2024-06-29 RX ORDER — LIDOCAINE 40 MG/G
CREAM TOPICAL
Status: DISCONTINUED | OUTPATIENT
Start: 2024-06-29 | End: 2024-06-29 | Stop reason: HOSPADM

## 2024-06-29 RX ORDER — DIPHENHYDRAMINE HYDROCHLORIDE 50 MG/ML
25 INJECTION INTRAMUSCULAR; INTRAVENOUS EVERY 6 HOURS PRN
Status: DISCONTINUED | OUTPATIENT
Start: 2024-06-29 | End: 2024-07-02 | Stop reason: HOSPADM

## 2024-06-29 RX ORDER — INSULIN ASPART 100 [IU]/ML
INJECTION, SOLUTION INTRAVENOUS; SUBCUTANEOUS
Status: ON HOLD | COMMUNITY
Start: 2024-06-06 | End: 2024-08-05

## 2024-06-29 RX ORDER — METOCLOPRAMIDE 10 MG/1
10 TABLET ORAL 4 TIMES DAILY PRN
COMMUNITY
Start: 2024-01-13 | End: 2024-10-02

## 2024-06-29 RX ORDER — SIMETHICONE 80 MG
160 TABLET,CHEWABLE ORAL EVERY 4 HOURS PRN
Status: DISCONTINUED | OUTPATIENT
Start: 2024-06-29 | End: 2024-07-02 | Stop reason: HOSPADM

## 2024-06-29 RX ORDER — HYDROXYZINE HYDROCHLORIDE 50 MG/1
50 TABLET, FILM COATED ORAL EVERY 6 HOURS PRN
Status: DISCONTINUED | OUTPATIENT
Start: 2024-06-29 | End: 2024-07-02 | Stop reason: HOSPADM

## 2024-06-29 RX ORDER — SENNOSIDES 8.6 MG
2 TABLET ORAL DAILY
COMMUNITY
End: 2024-10-02

## 2024-06-29 RX ORDER — HYDROXYZINE HYDROCHLORIDE 25 MG/1
25 TABLET, FILM COATED ORAL EVERY 6 HOURS PRN
Status: DISCONTINUED | OUTPATIENT
Start: 2024-06-29 | End: 2024-07-02 | Stop reason: HOSPADM

## 2024-06-29 RX ORDER — DEXTROSE MONOHYDRATE 25 G/50ML
25-50 INJECTION, SOLUTION INTRAVENOUS
Status: DISCONTINUED | OUTPATIENT
Start: 2024-06-29 | End: 2024-06-29

## 2024-06-29 RX ORDER — PRENATAL VIT/IRON FUM/FOLIC AC 27MG-0.8MG
1 TABLET ORAL DAILY
Status: DISCONTINUED | OUTPATIENT
Start: 2024-06-29 | End: 2024-07-02 | Stop reason: HOSPADM

## 2024-06-29 RX ORDER — METOCLOPRAMIDE HYDROCHLORIDE 5 MG/ML
10 INJECTION INTRAMUSCULAR; INTRAVENOUS EVERY 6 HOURS PRN
Status: DISCONTINUED | OUTPATIENT
Start: 2024-06-29 | End: 2024-07-02 | Stop reason: HOSPADM

## 2024-06-29 RX ORDER — ONDANSETRON 4 MG/1
4 TABLET, ORALLY DISINTEGRATING ORAL EVERY 6 HOURS PRN
Status: DISCONTINUED | OUTPATIENT
Start: 2024-06-29 | End: 2024-07-02 | Stop reason: HOSPADM

## 2024-06-29 RX ORDER — ONDANSETRON 2 MG/ML
4 INJECTION INTRAMUSCULAR; INTRAVENOUS EVERY 6 HOURS PRN
Status: DISCONTINUED | OUTPATIENT
Start: 2024-06-29 | End: 2024-07-02 | Stop reason: HOSPADM

## 2024-06-29 RX ORDER — ACETAMINOPHEN 325 MG/1
650 TABLET ORAL EVERY 4 HOURS PRN
Status: DISCONTINUED | OUTPATIENT
Start: 2024-06-29 | End: 2024-07-02 | Stop reason: HOSPADM

## 2024-06-29 RX ORDER — ASPIRIN 81 MG/1
81 TABLET, CHEWABLE ORAL EVERY EVENING
Status: DISCONTINUED | OUTPATIENT
Start: 2024-06-29 | End: 2024-07-02 | Stop reason: HOSPADM

## 2024-06-29 RX ORDER — DIPHENHYDRAMINE HCL 25 MG
25 CAPSULE ORAL EVERY 6 HOURS PRN
Status: DISCONTINUED | OUTPATIENT
Start: 2024-06-29 | End: 2024-07-02 | Stop reason: HOSPADM

## 2024-06-29 RX ORDER — FERROUS SULFATE 325(65) MG
325 TABLET ORAL 2 TIMES DAILY
Status: DISCONTINUED | OUTPATIENT
Start: 2024-06-29 | End: 2024-07-02 | Stop reason: HOSPADM

## 2024-06-29 RX ORDER — PROMETHAZINE HYDROCHLORIDE 25 MG/1
25 TABLET ORAL EVERY 6 HOURS PRN
COMMUNITY
Start: 2023-12-29 | End: 2024-10-02

## 2024-06-29 RX ORDER — NICOTINE POLACRILEX 4 MG
15-30 LOZENGE BUCCAL
Status: DISCONTINUED | OUTPATIENT
Start: 2024-06-29 | End: 2024-06-29

## 2024-06-29 RX ORDER — ASPIRIN 81 MG/1
81 TABLET ORAL DAILY
Status: ON HOLD | COMMUNITY
End: 2024-08-05

## 2024-06-29 RX ORDER — PROCHLORPERAZINE 25 MG
25 SUPPOSITORY, RECTAL RECTAL EVERY 12 HOURS PRN
Status: DISCONTINUED | OUTPATIENT
Start: 2024-06-29 | End: 2024-07-02 | Stop reason: HOSPADM

## 2024-06-29 RX ORDER — DEXTROSE MONOHYDRATE 25 G/50ML
25-50 INJECTION, SOLUTION INTRAVENOUS
Status: DISCONTINUED | OUTPATIENT
Start: 2024-06-29 | End: 2024-07-02 | Stop reason: HOSPADM

## 2024-06-29 RX ORDER — METOCLOPRAMIDE 10 MG/1
10 TABLET ORAL EVERY 6 HOURS PRN
Status: DISCONTINUED | OUTPATIENT
Start: 2024-06-29 | End: 2024-07-02 | Stop reason: HOSPADM

## 2024-06-29 RX ORDER — INSULIN LISPRO 100 [IU]/ML
1 INJECTION, SOLUTION INTRAVENOUS; SUBCUTANEOUS
Status: DISCONTINUED | OUTPATIENT
Start: 2024-06-30 | End: 2024-06-29

## 2024-06-29 RX ORDER — INSULIN LISPRO 100 [IU]/ML
1 INJECTION, SOLUTION INTRAVENOUS; SUBCUTANEOUS
Status: DISCONTINUED | OUTPATIENT
Start: 2024-06-29 | End: 2024-06-29

## 2024-06-29 RX ORDER — NICOTINE POLACRILEX 4 MG
15-30 LOZENGE BUCCAL
Status: DISCONTINUED | OUTPATIENT
Start: 2024-06-29 | End: 2024-07-02 | Stop reason: HOSPADM

## 2024-06-29 RX ORDER — MAGNESIUM HYDROXIDE/ALUMINUM HYDROXICE/SIMETHICONE 120; 1200; 1200 MG/30ML; MG/30ML; MG/30ML
30 SUSPENSION ORAL
Status: DISCONTINUED | OUTPATIENT
Start: 2024-06-29 | End: 2024-07-02 | Stop reason: HOSPADM

## 2024-06-29 RX ORDER — INSULIN ASPART 100 [IU]/ML
INJECTION, SOLUTION INTRAVENOUS; SUBCUTANEOUS DAILY PRN
Status: DISCONTINUED | OUTPATIENT
Start: 2024-06-29 | End: 2024-06-29

## 2024-06-29 RX ADMIN — SENNOSIDES 8.6 MG: 8.6 TABLET, FILM COATED ORAL at 21:19

## 2024-06-29 RX ADMIN — FERROUS SULFATE TAB 325 MG (65 MG ELEMENTAL FE) 325 MG: 325 (65 FE) TAB at 20:39

## 2024-06-29 RX ADMIN — ASPIRIN 81 MG CHEWABLE TABLET 81 MG: 81 TABLET CHEWABLE at 20:39

## 2024-06-29 RX ADMIN — INSULIN ASPART 15 UNITS: 100 INJECTION, SUSPENSION SUBCUTANEOUS at 19:02

## 2024-06-29 RX ADMIN — INSULIN GLARGINE 60 UNITS: 100 INJECTION, SOLUTION SUBCUTANEOUS at 22:51

## 2024-06-29 RX ADMIN — PRENATAL VIT W/ FE FUMARATE-FA TAB 27-0.8 MG 1 TABLET: 27-0.8 TAB at 20:39

## 2024-06-29 RX ADMIN — INSULIN ASPART 1 UNITS: 100 INJECTION, SOLUTION INTRAVENOUS; SUBCUTANEOUS at 22:51

## 2024-06-29 RX ADMIN — HYDROXYZINE HYDROCHLORIDE 50 MG: 50 TABLET, FILM COATED ORAL at 22:42

## 2024-06-29 ASSESSMENT — ACTIVITIES OF DAILY LIVING (ADL)
ADLS_ACUITY_SCORE: 20

## 2024-06-29 NOTE — PROGRESS NOTES
Data: Patient presented to [facility]: 2024  4:24 PM.  Reason for maternal/fetal assessment is vaginal bleeding. Patient reports Red vaginal bleeding occurring around 1620 per patient. She reports small amount of bleeding with a silver dollar sized blood clot and previous bleeding with this pregnancy, last on 24. Patient denies uterine contractions, leaking of vaginal fluid/rupture of membranes, abdominal pain, pelvic pressure, nausea, vomiting, headache, visual disturbances, epigastric or RUQ pain, significant edema. Patient reports fetal movement is normal. Patient is a 32w1d .  Prenatal record reviewed. Pregnancy has been complicated by diabetes, advanced maternal age (>=36yo), obesity (pre-pregnancy BMI >=35), and IVF, subchorionic hemmorage .    Vital signs wnl, BP borderline.  Support person is present.     Action: Verbal consent for EFM. Triage assessment completed.     Response: Patient verbalized agreement with plan. Will contact Dr. Adhikari with update and further orders.

## 2024-06-30 LAB
GLUCOSE BLDC GLUCOMTR-MCNC: 73 MG/DL (ref 70–99)
GLUCOSE BLDC GLUCOMTR-MCNC: 83 MG/DL (ref 70–99)
GLUCOSE BLDC GLUCOMTR-MCNC: 91 MG/DL (ref 70–99)
GLUCOSE BLDC GLUCOMTR-MCNC: 97 MG/DL (ref 70–99)
GLUCOSE BLDC GLUCOMTR-MCNC: 98 MG/DL (ref 70–99)

## 2024-06-30 PROCEDURE — 120N000001 HC R&B MED SURG/OB

## 2024-06-30 PROCEDURE — 250N000013 HC RX MED GY IP 250 OP 250 PS 637: Performed by: OBSTETRICS & GYNECOLOGY

## 2024-06-30 RX ADMIN — FERROUS SULFATE TAB 325 MG (65 MG ELEMENTAL FE) 325 MG: 325 (65 FE) TAB at 21:02

## 2024-06-30 RX ADMIN — FERROUS SULFATE TAB 325 MG (65 MG ELEMENTAL FE) 325 MG: 325 (65 FE) TAB at 09:11

## 2024-06-30 RX ADMIN — ASPIRIN 81 MG CHEWABLE TABLET 81 MG: 81 TABLET CHEWABLE at 21:03

## 2024-06-30 RX ADMIN — PRENATAL VIT W/ FE FUMARATE-FA TAB 27-0.8 MG 1 TABLET: 27-0.8 TAB at 09:10

## 2024-06-30 RX ADMIN — INSULIN ASPART 8 UNITS: 100 INJECTION, SOLUTION INTRAVENOUS; SUBCUTANEOUS at 18:20

## 2024-06-30 RX ADMIN — INSULIN GLARGINE 60 UNITS: 100 INJECTION, SOLUTION SUBCUTANEOUS at 21:16

## 2024-06-30 RX ADMIN — SENNOSIDES 8.6 MG: 8.6 TABLET, FILM COATED ORAL at 09:10

## 2024-06-30 RX ADMIN — SENNOSIDES 8.6 MG: 8.6 TABLET, FILM COATED ORAL at 21:03

## 2024-06-30 RX ADMIN — HYDROXYZINE HYDROCHLORIDE 50 MG: 50 TABLET, FILM COATED ORAL at 21:03

## 2024-06-30 RX ADMIN — INSULIN GLARGINE 60 UNITS: 100 INJECTION, SOLUTION SUBCUTANEOUS at 09:11

## 2024-06-30 ASSESSMENT — ACTIVITIES OF DAILY LIVING (ADL)
ADLS_ACUITY_SCORE: 20

## 2024-06-30 NOTE — PLAN OF CARE
Problem: Antepartum Bleeding  Goal: Absence of Bleeding  Outcome: Progressing   Goal Outcome Evaluation:                        Problem: Adult Inpatient Plan of Care  Goal: Plan of Care Review  Description: The Plan of Care Review/Shift note should be completed every shift.  The Outcome Evaluation is a brief statement about your assessment that the patient is improving, declining, or no change.  This information will be displayed automatically on your shift  note.  Outcome: Progressing  Flowsheets (Taken 6/29/2024 2220)  Outcome Evaluation: Vaginal bleeding to cease.  NST BID.  Diabetes to be managed with orders.  Plan of Care Reviewed With: patient  Overall Patient Progress: improving     Problem: Antepartum Bleeding  Goal: Absence of Bleeding  Outcome: Progressing     Vaginal bleeding tapering since admission.  Blood sugars monitored and orders reviewed with provider.

## 2024-06-30 NOTE — PLAN OF CARE
Problem: Antepartum Bleeding  Goal: Absence of Bleeding  Outcome: Progressing    Patient reports sleeping well overnight. Patient will call out with any increased vaginal bleeding/ abdominal pain.

## 2024-06-30 NOTE — PROGRESS NOTES
Dr Nelson called, results of Ultrasound reviewed.  Patient off monitoring as ordered are NST BID.  BPP 8/8.  Medication orders reviewed with provider, continuing to adjust diabetic orders to correlate with patient routine.

## 2024-06-30 NOTE — PROGRESS NOTES
"OB ANTEPARTUM PROGRESS NOTE    IUP at 32w2d, admitted for second episode of bleeding    SUBJECTIVE:  Patient feels well. She has had no further bleeding. No contractions.     OBJECTIVE:  /68 (BP Location: Left arm, Patient Position: Semi-Talamantes's, Cuff Size: Adult Regular)   Pulse 101   Temp 98.4  F (36.9  C) (Oral)   Resp 16   Ht 1.6 m (5' 3\")   Wt 120.8 kg (266 lb 4.8 oz)   LMP 2023 (Exact Date)   SpO2 96%   BMI 47.17 kg/m     Abd: gravid  NST: Reassuring, BPP 8/8, vertex, SDP 4 cm    Hgb: 10.2, stable    ASSESSMENT:  39 year old  at 32w2d here with second episode of bleeding, considered to be from marginal placental abruption  Fetal status is reassuring  Known anemia  History of myomectomy  Type 2 DM, well-controlled  Obesity  IVF [regnancy    PLAN:  Will continue to watch in the hospital She lives 5 minutes away.   Discussed long-term plan  Will await recommendation from MFM with possible discharge tomorrow    Shantel Jimenez M.D.  "

## 2024-07-01 LAB
GLUCOSE BLDC GLUCOMTR-MCNC: 103 MG/DL (ref 70–99)
GLUCOSE BLDC GLUCOMTR-MCNC: 119 MG/DL (ref 70–99)
GLUCOSE BLDC GLUCOMTR-MCNC: 68 MG/DL (ref 70–99)
GLUCOSE BLDC GLUCOMTR-MCNC: 76 MG/DL (ref 70–99)
GLUCOSE BLDC GLUCOMTR-MCNC: 78 MG/DL (ref 70–99)
GLUCOSE BLDC GLUCOMTR-MCNC: 89 MG/DL (ref 70–99)

## 2024-07-01 PROCEDURE — 250N000013 HC RX MED GY IP 250 OP 250 PS 637: Performed by: OBSTETRICS & GYNECOLOGY

## 2024-07-01 PROCEDURE — 120N000001 HC R&B MED SURG/OB

## 2024-07-01 RX ADMIN — HYDROXYZINE HYDROCHLORIDE 50 MG: 50 TABLET, FILM COATED ORAL at 21:40

## 2024-07-01 RX ADMIN — INSULIN GLARGINE 60 UNITS: 100 INJECTION, SOLUTION SUBCUTANEOUS at 20:28

## 2024-07-01 RX ADMIN — INSULIN ASPART: 100 INJECTION, SOLUTION INTRAVENOUS; SUBCUTANEOUS at 07:47

## 2024-07-01 RX ADMIN — FERROUS SULFATE TAB 325 MG (65 MG ELEMENTAL FE) 325 MG: 325 (65 FE) TAB at 08:24

## 2024-07-01 RX ADMIN — PRENATAL VIT W/ FE FUMARATE-FA TAB 27-0.8 MG 1 TABLET: 27-0.8 TAB at 08:24

## 2024-07-01 RX ADMIN — INSULIN GLARGINE 60 UNITS: 100 INJECTION, SOLUTION SUBCUTANEOUS at 07:47

## 2024-07-01 RX ADMIN — FERROUS SULFATE TAB 325 MG (65 MG ELEMENTAL FE) 325 MG: 325 (65 FE) TAB at 20:31

## 2024-07-01 RX ADMIN — SENNOSIDES 8.6 MG: 8.6 TABLET, FILM COATED ORAL at 20:31

## 2024-07-01 RX ADMIN — INSULIN ASPART 10 UNITS: 100 INJECTION, SOLUTION INTRAVENOUS; SUBCUTANEOUS at 13:30

## 2024-07-01 RX ADMIN — ASPIRIN 81 MG CHEWABLE TABLET 81 MG: 81 TABLET CHEWABLE at 20:31

## 2024-07-01 RX ADMIN — SENNOSIDES 8.6 MG: 8.6 TABLET, FILM COATED ORAL at 08:24

## 2024-07-01 ASSESSMENT — ACTIVITIES OF DAILY LIVING (ADL)
ADLS_ACUITY_SCORE: 20

## 2024-07-01 NOTE — PROGRESS NOTES
Second episode of bleeding.      Discussed case with MFM.      Will continue in-house observation for an additional 24 hours-  MFM will come for ultrasound tomorrow and consider discharge at that time.       Marah Higuera DO, FACOG  7/1/2024 8:59 AM

## 2024-07-01 NOTE — PROGRESS NOTES
"OB ANTEPARTUM PROGRESS NOTE    IUP at 32w3d, admitted for second episode of bleeding     SUBJECTIVE:  Patient feels well. No problems. No further bleeding.     OBJECTIVE:  /56 (BP Location: Left arm, Patient Position: Semi-Talamantes's, Cuff Size: Adult Large)   Pulse 101   Temp 98.6  F (37  C) (Oral)   Resp 18   Ht 1.6 m (5' 3\")   Wt 120.8 kg (266 lb 4.8 oz)   LMP 2023 (Exact Date)   SpO2 98%   BMI 47.17 kg/m     Abd: gravid  NST: Pending this morning      ASSESSMENT:  39 year old  at 32w2d here with second episode of bleeding, considered to be from marginal placental abruption  Fetal status is reassuring  Known anemia  History of myomectomy  Type 2 DM, well-controlled  Obesity  IVF pregnancy     PLAN:  Will continue to watch in the hospital for now. She lives 5 minutes away.   Discussed long-term plan  Will await recommendation from [facility] with possible discharge today based on recommendation.    Shantel Jimenez M.D.     "

## 2024-07-01 NOTE — PROGRESS NOTES
Report rec'd from MONTSE Stanton RN, care assumed. Upon assessment patient is calm and cooperative, no signs of distress noted or verbalized. Patient denies any active bleeding at this time and reports light pink tinged when wiping. Patient placed on EFM with consent for NST, reports +FM and denies feeling contractions. NST reactive, no decelerations present, contractions noted x2, pt denied feeling them. VSS, afebrile. 2 hour postprandial and HS  blood glucose obtained at same time and was 83, pt given scheduled Lantus 60 units. Pt knows to call out if she notices and new bleeding, has pain or any concerns. Plan at this time is promote rest overnight.     Twan Chand RN

## 2024-07-01 NOTE — PLAN OF CARE
Goal Outcome Evaluation:         Pt has been stable status, VSS. She has denies having vaginal bleeding since yesterday. Her blood sugar has been stable today. She has a reactive non stress test and occasional mild contractions.

## 2024-07-01 NOTE — PROVIDER NOTIFICATION
07/01/24 0621   Comments   Nursing Comments Fasting BG 68     Pt requesting juice for low BG, given 4oz juice. Will recheck BG in 15 min.     0637: 1st 15 min BG recheck 89    0653: 2nd 15 min BG recheck 103    Primary RN Twan tello.     Marissa Scott, RN on 7/1/2024 at 7:02 AM

## 2024-07-02 ENCOUNTER — ANCILLARY PROCEDURE (OUTPATIENT)
Dept: ULTRASOUND IMAGING | Facility: HOSPITAL | Age: 39
DRG: 832 | End: 2024-07-02
Attending: OBSTETRICS & GYNECOLOGY
Payer: COMMERCIAL

## 2024-07-02 VITALS
SYSTOLIC BLOOD PRESSURE: 137 MMHG | DIASTOLIC BLOOD PRESSURE: 77 MMHG | RESPIRATION RATE: 20 BRPM | WEIGHT: 266.3 LBS | HEART RATE: 78 BPM | OXYGEN SATURATION: 98 % | BODY MASS INDEX: 47.18 KG/M2 | HEIGHT: 63 IN | TEMPERATURE: 98.4 F

## 2024-07-02 LAB
GLUCOSE BLDC GLUCOMTR-MCNC: 118 MG/DL (ref 70–99)
GLUCOSE BLDC GLUCOMTR-MCNC: 69 MG/DL (ref 70–99)
GLUCOSE BLDC GLUCOMTR-MCNC: 87 MG/DL (ref 70–99)

## 2024-07-02 PROCEDURE — 76816 OB US FOLLOW-UP PER FETUS: CPT | Mod: 26 | Performed by: OBSTETRICS & GYNECOLOGY

## 2024-07-02 PROCEDURE — 250N000013 HC RX MED GY IP 250 OP 250 PS 637: Performed by: OBSTETRICS & GYNECOLOGY

## 2024-07-02 PROCEDURE — 76819 FETAL BIOPHYS PROFIL W/O NST: CPT | Mod: 26 | Performed by: OBSTETRICS & GYNECOLOGY

## 2024-07-02 PROCEDURE — 76819 FETAL BIOPHYS PROFIL W/O NST: CPT

## 2024-07-02 PROCEDURE — G0463 HOSPITAL OUTPT CLINIC VISIT: HCPCS | Mod: 25

## 2024-07-02 PROCEDURE — 250N000012 HC RX MED GY IP 250 OP 636 PS 637: Performed by: OBSTETRICS & GYNECOLOGY

## 2024-07-02 RX ADMIN — PRENATAL VIT W/ FE FUMARATE-FA TAB 27-0.8 MG 1 TABLET: 27-0.8 TAB at 08:37

## 2024-07-02 RX ADMIN — FERROUS SULFATE TAB 325 MG (65 MG ELEMENTAL FE) 325 MG: 325 (65 FE) TAB at 08:37

## 2024-07-02 RX ADMIN — INSULIN GLARGINE 60 UNITS: 100 INJECTION, SOLUTION SUBCUTANEOUS at 10:03

## 2024-07-02 RX ADMIN — SENNOSIDES 8.6 MG: 8.6 TABLET, FILM COATED ORAL at 08:37

## 2024-07-02 ASSESSMENT — ACTIVITIES OF DAILY LIVING (ADL)
ADLS_ACUITY_SCORE: 20

## 2024-07-02 NOTE — PROGRESS NOTES
Report from Ethan RICHMOND.  Pt resting at this time, no bleeding per pt, discuss plan of care for tonight. Pt to call as needed during night and for any needs or questions.

## 2024-07-02 NOTE — PLAN OF CARE
Goal Outcome Evaluation:       NST is reactive  with accels 15x15 verified by Chikis KANG

## 2024-07-02 NOTE — PROGRESS NOTES
The patient was seen for an [facility] with [facility] today.  For a detailed report of the ultrasound examination, please see the ultrasound report which can be found under the imaging tab.    If you have questions regarding today's evaluation or if we can be of further service, please contact the [facility].    Linda Stokes MD  , OB/GYN  [facility]  [phone] (Pager)

## 2024-07-02 NOTE — PLAN OF CARE
Problem: Antepartum Bleeding  Goal: Absence of Bleeding  Outcome: Progressing   Goal Outcome Evaluation:

## 2024-07-02 NOTE — PLAN OF CARE
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

## 2024-07-02 NOTE — PLAN OF CARE
Goal Outcome Evaluation:             Patient has had no bleeding, spotting, contractions or tenderness in abdomen. MFM came over and did ultrasound patient told me it was 8/8 I have not seen report but patient is interested in going home sooner then later.  After, I get the report from ultrasound I will call md to see if we can discharge patient to home.

## 2024-07-02 NOTE — PROGRESS NOTES
[facility] to see today  If everything is good then will probably be discharged to home    Mayte Adhikari MD

## 2024-07-02 NOTE — DISCHARGE SUMMARY
HOSPITAL DISCHARGE SUMMARY    Patient Name: Melissa Albert   YOB: 1985  Age: 39 year old  Medical Record Number: [id number]  Primary Physician: Pablito Jacobo    Admission Date:  2024  Delivery Date:    This patient has no babies on file.  Gestational Age at Delivery:  32w4d   Discharge Date:  2024    REASON FOR ADMISSION: Labor and Delivery    DIAGNOSIS:    Intrauterine pregnancy at 32w4d with 2nd vaginal bleed    This patient has no babies on file.    PROCEDURES PERFORMED:  Ultrasound and MFM consultation     HISTORY OF PRESENT ILLNESS AND HOSPITAL COURSE: This is a 39 year old  at 32 weeks with her 2nd vaginal bleeding this pregnancy.  Initial ultrasound showed a subchorionic bleed.  Follow up ultrasound showed resolution of the bleed.  Thus she was discharged home today     LABS:  Lab Results   Component Value Date    HGB 10.2 (L) 2024       DISPOSITION:  Home    CONDITION AT DISCHARGE:  Good/Stable    Discharge Medications:      Review of your medicines        CONTINUE these medicines which have NOT CHANGED        Dose / Directions   aspirin 81 MG EC tablet      Dose: 81 mg  Take 81 mg by mouth daily  Refills: 0     Dexcom G6 Transmitter Misc      Change every 3 months.  Refills: 0     ferrous sulfate 325 (65 Fe) MG tablet  Commonly known as: FEROSUL  Indication: Anemia From Inadequate Iron in the Body      Dose: 325 mg  Take 325 mg by mouth 2 times daily  Refills: 0     hydrOXYzine 50 MG capsule  Commonly known as: VISTARIL  Used for: Vaginal bleeding in pregnancy      Dose: 50 mg  Take 1 capsule (50 mg) by mouth 3 times daily as needed for itching  Quantity: 30 capsule  Refills: 0     Insulin Aspart FlexPen 100 UNIT/ML Sopn      1 unit per 5g of carb and 1 unit for every 5 over 120. Max daily dose of 75 units.  Refills: 0     insulin glargine 100 UNIT/ML pen  Commonly known as: LANTUS PEN  Indication: Type 2 Diabetes  Used for: Type 2 diabetes mellitus complicating  pregnancy in third trimester, antepartum      Dose: 120 Units  Inject 120 Units Subcutaneous at bedtime Every three days if fasting BG is not below 95, increase by 10units (per patient)  Quantity: 15 mL  Refills: 1     * insulin lispro 100 UNIT/ML (1 unit dial) KWIKPEN  Commonly known as: HumaLOG KWIKPEN  Indication: Type 2 Diabetes      Dose: 1 units/carb unit  Inject 1 units/carb unit Subcutaneous 4 times daily (with meals and nightly) 1 unit of insulin per every 5 grams of CHO  Refills: 0     * insulin lispro 100 UNIT/ML (1 unit dial) KWIKPEN  Commonly known as: HumaLOG KWIKPEN  Used for: Type 2 diabetes mellitus without complication, without long-term current use of insulin (H)      Dose: 1 Units  Inject 1 Units Subcutaneous 4 times daily (with meals and nightly)  Quantity: 15 mL  Refills: 0     * HumaLOG KWIKpen 100 UNIT/ML (1 unit dial) KWIKPEN  Indication: Type 2 Diabetes  Generic drug: insulin lispro      Dose: 1 Units  Inject 1 Units Subcutaneous 4 times daily (with meals and nightly) 1 UNIT FOR EVERY 5 .  Refills: 0     metoclopramide 10 MG tablet  Commonly known as: REGLAN      Dose: 10 mg  Take 10 mg by mouth 4 times daily as needed  Refills: 0     ondansetron 4 MG ODT tab  Commonly known as: ZOFRAN ODT      dissolve 1 tablet on the tongue every 8 hours as needed for nausea  Refills: 0     ondansetron 4 MG tablet  Commonly known as: ZOFRAN  Indication: Nausea and Vomiting in Pregnancy      Dose: 4 mg  Take 4 mg by mouth every 8 hours as needed for nausea  Refills: 0     PRENATAL 1 PO      Refills: 0     promethazine 25 MG tablet  Commonly known as: PHENERGAN      Dose: 25 mg  Take 25 mg by mouth every 6 hours as needed for nausea  Refills: 0     sennosides 8.6 MG tablet  Commonly known as: SENOKOT      Dose: 2 tablet  Take 2 tablets by mouth daily  Refills: 0           * This list has 3 medication(s) that are the same as other medications prescribed for you. Read the directions carefully, and ask  your doctor or other care provider to review them with you.                  DISCHARGE PLAN:   - Follow up with Dr. Lund in clinic tomorrow   - Take medication as prescribed  - Physical activity Pelvic rest.  - Diet:  Diabetic  - Medication:  Please see MAR  - Warning signs discussed with patient about when to call the clinic/hospital  - All questions and concerns were answered for the patient prior to discharge.     Elvia Cornell MD 7/2/2024 4:58 PM    I saw the patient on the date of discharge  Total time spent for discharge on date of discharge: 15 minutes

## 2024-07-02 NOTE — PLAN OF CARE
Problem: Adult Inpatient Plan of Care  Goal: Plan of Care Review  Description: The Plan of Care Review/Shift note should be completed every shift.  The Outcome Evaluation is a brief statement about your assessment that the patient is improving, declining, or no change.  This information will be displayed automatically on your shift  note.  Outcome: Progressing  Flowsheets (Taken 7/1/2024 6576)  Plan of Care Reviewed With: patient  Goal: Absence of Hospital-Acquired Illness or Injury  Outcome: Progressing  Goal: Optimal Comfort and Wellbeing  Outcome: Progressing     Problem: Antepartum Bleeding  Goal: Absence of Bleeding  Outcome: Progressing   Goal Outcome Evaluation:      Plan of Care Reviewed With: patient      Pt will call as needed during night.

## 2024-07-02 NOTE — PLAN OF CARE
Problem: Antepartum Bleeding  Goal: Absence of Bleeding  7/1/2024 2237 by Ethan Quinonez RN  Outcome: Progressing  7/1/2024 2237 by Ethan Quinonez RN  Outcome: Progressing   Goal Outcome Evaluation:    Vital signs stable. No new bleeding events. FHT Cat 1, no evidence of fetal acidemia.

## 2024-07-02 NOTE — PLAN OF CARE
Goal Outcome Evaluation:     Patient wants to wait till 1430 for vitals and NST d/t she has meetings for work on computer.

## 2024-07-02 NOTE — PROGRESS NOTES
Discharge instructions discussed with patient. Questioned answered. Home undelivered with . Aware of when to follow up with

## 2024-07-02 NOTE — PROGRESS NOTES
Called by MFM, ultrasound normal, no further sign of subchorionic bleed  Ok to send home  Orders placed  KELLY Cornell MD

## 2024-07-03 ENCOUNTER — PATIENT OUTREACH (OUTPATIENT)
Dept: CARE COORDINATION | Facility: CLINIC | Age: 39
End: 2024-07-03
Payer: COMMERCIAL

## 2024-07-03 NOTE — PROGRESS NOTES
[facility]: [facility]    Background: Transitional Care Management program identified per system criteria and reviewed by [facility] team for possible outreach.    Assessment: Upon chart review, [facility] Team member will not proceed with patient outreach related to this episode of Transitional Care Management program due to reason below:    Patient has a follow up appointment with an appropriate provider today for hospital discharge.  Dr Seals.    Plan: Transitional Care Management episode addressed appropriately per reason noted above.      Maritza Rashid MA  [facility], [facility]    *Connected Care Resource Team does NOT follow patient ongoing. Referrals are identified based on internal discharge reports and the outreach is to ensure patient has an understanding of their discharge instructions.

## 2024-07-05 NOTE — DISCHARGE SUMMARY
[facility] Discharge Summary    Melissa Albert MRN# [id number]   Age: 39 year old YOB: 1985     Date of Admission:  6/10/2024  Date of Discharge::  2024  5:00 PM  Admitting Physician:  Stefanie Sultana MD  Discharge Physician:  Mayte Adhikari MD    Home clinic:          Admission Diagnoses:   History of  delivery, antepartum [O34.219]  Vaginal bleeding   Type II diabetes mellitus (H) [E11.9]  Iup at 29 weeks          Discharge Diagnosis:     Same as above           Procedures:   Betamethasone  Fetal and maternal monitoring           Medications Prior to Admission:     No medications prior to admission.             Discharge Medications:     Discharge Medication List as of 2024  4:26 PM        START taking these medications    Details   hydrOXYzine (VISTARIL) 50 MG capsule Take 1 capsule (50 mg) by mouth 3 times daily as needed for itching, Disp-30 capsule, R-0, E-Prescribe      !! insulin lispro (HUMALOG KWIKPEN) 100 UNIT/ML (1 unit dial) KWIKPEN Inject 1 Units Subcutaneous 4 times daily (with meals and nightly), Disp-15 mL, R-0, E-PrescribeInject 1 units/carb unit subcutaneous 4 times daily (with meals and nightly). 1 unit of insulin per every 5 grams of CHO.       !! - Potential duplicate medications found. Please discuss with provider.        CONTINUE these medications which have CHANGED    Details   insulin glargine (LANTUS PEN) 100 UNIT/ML pen Inject 120 Units Subcutaneous at bedtime Every three days if fasting BG is not below 95, increase by 10units (per patient), Disp-15 mL, R-1, E-PrescribeIf Lantus is not covered by insurance, may substitute Basaglar or Semglee or other insulin glargine p roduct per insurance preference at same dose and frequency.             CONTINUE these medications which have NOT CHANGED    Details   Continuous Blood Gluc Transmit (DEXCOM G6 TRANSMITTER) MISC Change every 3 months., Historical      ferrous sulfate (FEROSUL) 325 (65  Fe) MG tablet Take 325 mg by mouth 2 times daily, Historical      !! insulin lispro (HUMALOG KWIKPEN) 100 UNIT/ML (1 unit dial) KWIKPEN Inject 1 Units Subcutaneous 4 times daily (with meals and nightly) 1 UNIT FOR EVERY 5 ., Historical      !! insulin lispro (HUMALOG KWIKPEN) 100 UNIT/ML (1 unit dial) KWIKPEN Inject 1 units/carb unit Subcutaneous 4 times daily (with meals and nightly) 1 unit of insulin per every 5 grams of CHO, Historical      ondansetron (ZOFRAN) 4 MG tablet Take 4 mg by mouth every 8 hours as needed for nausea, Historical      Prenatal MV-Min-Fe Fum-FA-DHA (PRENATAL 1 PO) Historical       !! - Potential duplicate medications found. Please discuss with provider.                Consultations:   [facility] for diabetic management  M            Brief History of Illness:   Melissa Albert is a 39 year old female who is 29w3d pregnant and being admitted for vaginal bleeding.  Patient was at home working at kitchen table and felt a gush of chema blood, does not think also amniotic fluid and came in through ER.  She denies significant pain, does feel a little anxious.  Reports an episode of bleeding that was much less at 24 weeks and did not require admission.  Per report fundal and posterior placenta without evidence of previa.  Endorses good fetal movement.            Hospital Course:   She was monitored and did not have any further bleeding.  She was seen by [facility] who recommended montiroing for 72 hours after bleed.  She received betamethasone.            Discharge Instructions and Follow-Up:     Discharge diet: Carb counting    Discharge activity: No sex f   Discharge follow-up: Follow up with [facility]  in 2-3 days. days   Wound care:            Discharge Disposition:     Discharged to home      Attestation:  I have reviewed today's vital signs, notes, medications, labs and imaging.    Mayte Adhikari MD

## 2024-07-06 ENCOUNTER — HEALTH MAINTENANCE LETTER (OUTPATIENT)
Age: 39
End: 2024-07-06

## 2024-07-09 ENCOUNTER — HOSPITAL ENCOUNTER (INPATIENT)
Facility: HOSPITAL | Age: 39
LOS: 1 days | Discharge: HOME | DRG: 832 | End: 2024-07-10
Attending: OBSTETRICS & GYNECOLOGY | Admitting: EMERGENCY MEDICINE
Payer: COMMERCIAL

## 2024-07-09 PROBLEM — N93.9 VAGINAL BLEEDING: Status: ACTIVE | Noted: 2024-07-09

## 2024-07-09 LAB
ABO/RH(D): NORMAL
ANTIBODY SCREEN: NEGATIVE
SPECIMEN EXPIRATION DATE: NORMAL

## 2024-07-09 PROCEDURE — 36415 COLL VENOUS BLD VENIPUNCTURE: CPT | Performed by: STUDENT IN AN ORGANIZED HEALTH CARE EDUCATION/TRAINING PROGRAM

## 2024-07-09 PROCEDURE — 120N000001 HC R&B MED SURG/OB

## 2024-07-09 PROCEDURE — 85025 COMPLETE CBC W/AUTO DIFF WBC: CPT | Performed by: STUDENT IN AN ORGANIZED HEALTH CARE EDUCATION/TRAINING PROGRAM

## 2024-07-09 PROCEDURE — 82040 ASSAY OF SERUM ALBUMIN: CPT | Performed by: STUDENT IN AN ORGANIZED HEALTH CARE EDUCATION/TRAINING PROGRAM

## 2024-07-09 PROCEDURE — 86900 BLOOD TYPING SEROLOGIC ABO: CPT | Performed by: STUDENT IN AN ORGANIZED HEALTH CARE EDUCATION/TRAINING PROGRAM

## 2024-07-09 RX ORDER — SIMETHICONE 80 MG
160 TABLET,CHEWABLE ORAL EVERY 4 HOURS PRN
Status: DISCONTINUED | OUTPATIENT
Start: 2024-07-09 | End: 2024-07-10 | Stop reason: HOSPADM

## 2024-07-09 RX ORDER — AMOXICILLIN 250 MG
2 CAPSULE ORAL 2 TIMES DAILY
Status: DISCONTINUED | OUTPATIENT
Start: 2024-07-10 | End: 2024-07-10 | Stop reason: HOSPADM

## 2024-07-09 RX ORDER — DIPHENHYDRAMINE HYDROCHLORIDE 50 MG/ML
25 INJECTION INTRAMUSCULAR; INTRAVENOUS EVERY 6 HOURS PRN
Status: DISCONTINUED | OUTPATIENT
Start: 2024-07-09 | End: 2024-07-10 | Stop reason: HOSPADM

## 2024-07-09 RX ORDER — PROCHLORPERAZINE 25 MG
25 SUPPOSITORY, RECTAL RECTAL EVERY 12 HOURS PRN
Status: DISCONTINUED | OUTPATIENT
Start: 2024-07-09 | End: 2024-07-10 | Stop reason: HOSPADM

## 2024-07-09 RX ORDER — METOCLOPRAMIDE 10 MG/1
10 TABLET ORAL EVERY 6 HOURS PRN
Status: DISCONTINUED | OUTPATIENT
Start: 2024-07-09 | End: 2024-07-10 | Stop reason: HOSPADM

## 2024-07-09 RX ORDER — LIDOCAINE 40 MG/G
CREAM TOPICAL
Status: DISCONTINUED | OUTPATIENT
Start: 2024-07-09 | End: 2024-07-10 | Stop reason: HOSPADM

## 2024-07-09 RX ORDER — PANTOPRAZOLE SODIUM 40 MG/1
40 TABLET, DELAYED RELEASE ORAL
Status: DISCONTINUED | OUTPATIENT
Start: 2024-07-10 | End: 2024-07-10 | Stop reason: HOSPADM

## 2024-07-09 RX ORDER — ONDANSETRON 4 MG/1
4 TABLET, ORALLY DISINTEGRATING ORAL EVERY 6 HOURS PRN
Status: DISCONTINUED | OUTPATIENT
Start: 2024-07-09 | End: 2024-07-10 | Stop reason: HOSPADM

## 2024-07-09 RX ORDER — PRENATAL VIT/IRON FUM/FOLIC AC 27MG-0.8MG
1 TABLET ORAL DAILY
Status: DISCONTINUED | OUTPATIENT
Start: 2024-07-10 | End: 2024-07-10 | Stop reason: HOSPADM

## 2024-07-09 RX ORDER — ONDANSETRON 2 MG/ML
4 INJECTION INTRAMUSCULAR; INTRAVENOUS EVERY 6 HOURS PRN
Status: DISCONTINUED | OUTPATIENT
Start: 2024-07-09 | End: 2024-07-10 | Stop reason: HOSPADM

## 2024-07-09 RX ORDER — METOCLOPRAMIDE HYDROCHLORIDE 5 MG/ML
10 INJECTION INTRAMUSCULAR; INTRAVENOUS EVERY 6 HOURS PRN
Status: DISCONTINUED | OUTPATIENT
Start: 2024-07-09 | End: 2024-07-10 | Stop reason: HOSPADM

## 2024-07-09 RX ORDER — PROCHLORPERAZINE MALEATE 10 MG
10 TABLET ORAL EVERY 6 HOURS PRN
Status: DISCONTINUED | OUTPATIENT
Start: 2024-07-09 | End: 2024-07-10 | Stop reason: HOSPADM

## 2024-07-09 RX ORDER — AMOXICILLIN 250 MG
1 CAPSULE ORAL 2 TIMES DAILY
Status: DISCONTINUED | OUTPATIENT
Start: 2024-07-10 | End: 2024-07-10 | Stop reason: HOSPADM

## 2024-07-09 RX ORDER — LABETALOL HYDROCHLORIDE 5 MG/ML
20-80 INJECTION, SOLUTION INTRAVENOUS EVERY 10 MIN PRN
Status: DISCONTINUED | OUTPATIENT
Start: 2024-07-09 | End: 2024-07-10 | Stop reason: HOSPADM

## 2024-07-09 RX ORDER — SODIUM CHLORIDE, SODIUM LACTATE, POTASSIUM CHLORIDE, CALCIUM CHLORIDE 600; 310; 30; 20 MG/100ML; MG/100ML; MG/100ML; MG/100ML
10-125 INJECTION, SOLUTION INTRAVENOUS CONTINUOUS
Status: DISCONTINUED | OUTPATIENT
Start: 2024-07-10 | End: 2024-07-10 | Stop reason: HOSPADM

## 2024-07-09 RX ORDER — ACETAMINOPHEN 325 MG/1
650 TABLET ORAL EVERY 4 HOURS PRN
Status: DISCONTINUED | OUTPATIENT
Start: 2024-07-09 | End: 2024-07-10 | Stop reason: HOSPADM

## 2024-07-09 RX ORDER — HYDRALAZINE HYDROCHLORIDE 20 MG/ML
10 INJECTION INTRAMUSCULAR; INTRAVENOUS
Status: DISCONTINUED | OUTPATIENT
Start: 2024-07-09 | End: 2024-07-10 | Stop reason: HOSPADM

## 2024-07-09 RX ORDER — DIPHENHYDRAMINE HCL 25 MG
25 CAPSULE ORAL EVERY 6 HOURS PRN
Status: DISCONTINUED | OUTPATIENT
Start: 2024-07-09 | End: 2024-07-10 | Stop reason: HOSPADM

## 2024-07-09 RX ORDER — MAGNESIUM HYDROXIDE/ALUMINUM HYDROXICE/SIMETHICONE 120; 1200; 1200 MG/30ML; MG/30ML; MG/30ML
30 SUSPENSION ORAL
Status: DISCONTINUED | OUTPATIENT
Start: 2024-07-09 | End: 2024-07-10 | Stop reason: HOSPADM

## 2024-07-09 RX ORDER — HYDROXYZINE HYDROCHLORIDE 50 MG/1
50 TABLET, FILM COATED ORAL
Status: DISCONTINUED | OUTPATIENT
Start: 2024-07-09 | End: 2024-07-10 | Stop reason: HOSPADM

## 2024-07-09 ASSESSMENT — ACTIVITIES OF DAILY LIVING (ADL): ADLS_ACUITY_SCORE: 35

## 2024-07-10 ENCOUNTER — ANCILLARY PROCEDURE (OUTPATIENT)
Dept: ULTRASOUND IMAGING | Facility: HOSPITAL | Age: 39
DRG: 832 | End: 2024-07-10
Attending: OBSTETRICS & GYNECOLOGY
Payer: COMMERCIAL

## 2024-07-10 VITALS
TEMPERATURE: 98.2 F | DIASTOLIC BLOOD PRESSURE: 74 MMHG | OXYGEN SATURATION: 95 % | WEIGHT: 266 LBS | HEART RATE: 105 BPM | BODY MASS INDEX: 47.13 KG/M2 | HEIGHT: 63 IN | RESPIRATION RATE: 18 BRPM | SYSTOLIC BLOOD PRESSURE: 142 MMHG

## 2024-07-10 LAB
ALBUMIN SERPL BCG-MCNC: 3.2 G/DL (ref 3.5–5.2)
ALP SERPL-CCNC: 95 U/L (ref 40–150)
ALT SERPL W P-5'-P-CCNC: 8 U/L (ref 0–50)
ANION GAP SERPL CALCULATED.3IONS-SCNC: 10 MMOL/L (ref 7–15)
AST SERPL W P-5'-P-CCNC: 8 U/L (ref 0–45)
BASOPHILS # BLD AUTO: 0 10E3/UL (ref 0–0.2)
BASOPHILS NFR BLD AUTO: 0 %
BILIRUB SERPL-MCNC: 0.2 MG/DL
BUN SERPL-MCNC: 4.4 MG/DL (ref 6–20)
CALCIUM SERPL-MCNC: 8.5 MG/DL (ref 8.6–10)
CHLORIDE SERPL-SCNC: 104 MMOL/L (ref 98–107)
CREAT SERPL-MCNC: 0.53 MG/DL (ref 0.51–0.95)
DEPRECATED HCO3 PLAS-SCNC: 23 MMOL/L (ref 22–29)
EGFRCR SERPLBLD CKD-EPI 2021: >90 ML/MIN/1.73M2
EOSINOPHIL # BLD AUTO: 0.1 10E3/UL (ref 0–0.7)
EOSINOPHIL NFR BLD AUTO: 1 %
ERYTHROCYTE [DISTWIDTH] IN BLOOD BY AUTOMATED COUNT: 21.7 % (ref 10–15)
GLUCOSE BLDC GLUCOMTR-MCNC: 75 MG/DL (ref 70–99)
GLUCOSE BLDC GLUCOMTR-MCNC: 90 MG/DL (ref 70–99)
GLUCOSE SERPL-MCNC: 112 MG/DL (ref 70–99)
HCT VFR BLD AUTO: 30.5 % (ref 35–47)
HGB BLD-MCNC: 9.6 G/DL (ref 11.7–15.7)
HOLD SPECIMEN: NORMAL
HOLD SPECIMEN: NORMAL
IMM GRANULOCYTES # BLD: 0.1 10E3/UL
IMM GRANULOCYTES NFR BLD: 1 %
LYMPHOCYTES # BLD AUTO: 2.1 10E3/UL (ref 0.8–5.3)
LYMPHOCYTES NFR BLD AUTO: 22 %
MCH RBC QN AUTO: 27.4 PG (ref 26.5–33)
MCHC RBC AUTO-ENTMCNC: 31.5 G/DL (ref 31.5–36.5)
MCV RBC AUTO: 87 FL (ref 78–100)
MONOCYTES # BLD AUTO: 0.4 10E3/UL (ref 0–1.3)
MONOCYTES NFR BLD AUTO: 4 %
NEUTROPHILS # BLD AUTO: 7 10E3/UL (ref 1.6–8.3)
NEUTROPHILS NFR BLD AUTO: 72 %
NRBC # BLD AUTO: 0 10E3/UL
NRBC BLD AUTO-RTO: 0 /100
PLATELET # BLD AUTO: 265 10E3/UL (ref 150–450)
POTASSIUM SERPL-SCNC: 3.7 MMOL/L (ref 3.4–5.3)
PROT SERPL-MCNC: 6.1 G/DL (ref 6.4–8.3)
RBC # BLD AUTO: 3.5 10E6/UL (ref 3.8–5.2)
SODIUM SERPL-SCNC: 137 MMOL/L (ref 135–145)
WBC # BLD AUTO: 9.7 10E3/UL (ref 4–11)

## 2024-07-10 PROCEDURE — 99232 SBSQ HOSP IP/OBS MODERATE 35: CPT | Mod: GC

## 2024-07-10 PROCEDURE — 76819 FETAL BIOPHYS PROFIL W/O NST: CPT | Mod: 26 | Performed by: STUDENT IN AN ORGANIZED HEALTH CARE EDUCATION/TRAINING PROGRAM

## 2024-07-10 PROCEDURE — 250N000013 HC RX MED GY IP 250 OP 250 PS 637: Performed by: STUDENT IN AN ORGANIZED HEALTH CARE EDUCATION/TRAINING PROGRAM

## 2024-07-10 PROCEDURE — 99222 1ST HOSP IP/OBS MODERATE 55: CPT | Mod: 25 | Performed by: STUDENT IN AN ORGANIZED HEALTH CARE EDUCATION/TRAINING PROGRAM

## 2024-07-10 PROCEDURE — G0463 HOSPITAL OUTPT CLINIC VISIT: HCPCS | Mod: 25

## 2024-07-10 PROCEDURE — 76819 FETAL BIOPHYS PROFIL W/O NST: CPT

## 2024-07-10 PROCEDURE — G0378 HOSPITAL OBSERVATION PER HR: HCPCS

## 2024-07-10 RX ORDER — NICOTINE POLACRILEX 4 MG
15-30 LOZENGE BUCCAL
Status: DISCONTINUED | OUTPATIENT
Start: 2024-07-10 | End: 2024-07-10 | Stop reason: HOSPADM

## 2024-07-10 RX ORDER — DEXTROSE MONOHYDRATE 25 G/50ML
25-50 INJECTION, SOLUTION INTRAVENOUS
Status: DISCONTINUED | OUTPATIENT
Start: 2024-07-10 | End: 2024-07-10 | Stop reason: HOSPADM

## 2024-07-10 RX ADMIN — PRENATAL VIT W/ FE FUMARATE-FA TAB 27-0.8 MG 1 TABLET: 27-0.8 TAB at 08:02

## 2024-07-10 RX ADMIN — SENNOSIDES AND DOCUSATE SODIUM 1 TABLET: 50; 8.6 TABLET ORAL at 08:02

## 2024-07-10 ASSESSMENT — ACTIVITIES OF DAILY LIVING (ADL)
ADLS_ACUITY_SCORE: 35
ADLS_ACUITY_SCORE: 20
ADLS_ACUITY_SCORE: 35
ADLS_ACUITY_SCORE: 20
ADLS_ACUITY_SCORE: 20
ADLS_ACUITY_SCORE: 35
ADLS_ACUITY_SCORE: 35

## 2024-07-10 NOTE — PROGRESS NOTES
Post breakfast glucose was 90, no sliding scale needed. Pt says no new active bleeding noted, no cramping noted. Says very small amount of pink and discharge noted when wiping.

## 2024-07-10 NOTE — DISCHARGE INSTRUCTIONS
Learning About When to Call Your Doctor During Pregnancy (After 20 Weeks)  Overview  It's common to have concerns about what might be a problem when you're pregnant. Most pregnancies don't have any serious problems. But it's still important to know when to call your doctor if you have certain symptoms or signs of labor.  These are general suggestions. Your doctor may give you some more information about when to call.  When to call your doctor (after 20 weeks)  Call 911  anytime you think you may need emergency care. For example, call if:  You have severe vaginal bleeding. This means you are soaking through a pad each hour for 2 or more hours.  You have sudden, severe pain in your belly.  You have chest pain, are short of breath, or cough up blood.  You passed out (lost consciousness).  You have a seizure.  You see or feel the umbilical cord.  You think you are about to deliver your baby and can't make it safely to the hospital or birthing center.  Call your doctor now or seek immediate medical care if:  You have vaginal bleeding.  You have belly pain.  You have a fever.  You are dizzy or lightheaded, or you feel like you may faint.  You have signs of a blood clot in your leg (called a deep vein thrombosis), such as:  Pain in the calf, back of the knee, thigh, or groin.  Swelling in your leg or groin.  A color change on the leg or groin. The skin may be reddish or purplish, depending on your usual skin color.  You have symptoms of preeclampsia, such as:  Sudden swelling of your face, hands, or feet.  New vision problems (such as dimness, blurring, or seeing spots).  A severe headache.  You have a sudden release of fluid from your vagina. (You think your water broke.)  You've been having regular contractions for an hour. This means that you've had at least 6 contractions within 1 hour, even after you change your position and drink fluids.  You notice that your baby has stopped moving or is moving less than  "normal.  You have signs of heart failure, such as:  New or increased shortness of breath.  New or worse swelling in your legs, ankles, or feet.  Sudden weight gain, such as more than 2 to 3 pounds in a day or 5 pounds in a week.  Feeling so tired or weak that you cannot do your usual activities.  You have symptoms of a urinary tract infection. These may include:  Pain or burning when you urinate.  A frequent need to urinate without being able to pass much urine.  Pain in the flank, which is just below the rib cage and above the waist on either side of the back.  Blood in your urine.  Watch closely for changes in your health, and be sure to contact your doctor if:  You have vaginal discharge that smells bad.  You feel sad, anxious, or hopeless for more than a few days.  You have skin changes, such as a rash, itching, or a yellow color to your skin.  You have other concerns about your pregnancy.  If you have labor signs at 37 weeks or more  If you have signs of labor at 37 weeks or more, your doctor may tell you to call when your labor becomes more active. Symptoms of active labor include:  Contractions that are regular.  Contractions that are less than 5 minutes apart.  Contractions that are hard to talk through.  Follow-up care is a key part of your treatment and safety. Be sure to make and go to all appointments, and call your doctor if you are having problems. It's also a good idea to know your test results and keep a list of the medicines you take.  Where can you learn more?  Go to https://www.[employer].net/patiented  Enter N531 in the search box to learn more about \"Learning About When to Call Your Doctor During Pregnancy (After 20 Weeks).\"  Current as of: July 10, 2023               Content Version: 14.0    3403-3719 [employer].   Care instructions adapted under license by your healthcare professional. If you have questions about a medical condition or this instruction, always ask your healthcare " professional. [employer] disclaims any warranty or liability for your use of this information.

## 2024-07-10 NOTE — PLAN OF CARE
Problem: Adult Inpatient Plan of Care  Goal: Optimal Comfort and Wellbeing  7/10/2024 0533 by Cristin Camejo, RN  Outcome: Progressing  7/10/2024 0530 by Cristin Camejo RN  Outcome: Progressing  Intervention: Provide Person-Centered Care  Recent Flowsheet Documentation  Taken 2024 2346 by Cristin Camejo, RN  Trust Relationship/Rapport:   care explained   questions encouraged   thoughts/feelings acknowledged   questions answered   Goal Outcome Evaluation:       33w 5d  presented with bleeding, no active bleeding noted, dark red blood seen during speculum examination by Dr. Jones. Fetal movement reported for NST Q shift. Care provided, monitoring continues for active bleeding or improvement in vaginal bleeding. No pain reported. VSS

## 2024-07-10 NOTE — PROGRESS NOTES
Data: [facility]: 2024 10:43 PM.  Reason for maternal/fetal assessment is vaginal bleeding. Patient reports a quarter size blood spot on her underwear this evening while she was in bed around 2215.  Patient is a . Pregnancy has been complicated by partial abruption x2, Type II DM, insulin controlled, AMA, and IVF pregnancy.  Gestational Age 33w4d. VSS. Fetal movement present. Patient denies uterine contractions, leaking of vaginal fluid/rupture of membranes, abdominal pain. Support person is not present.   Action: Verbal consent for EFM. Triage assessment completed. Bill of rights reviewed.  Response: Patient verbalized agreement with plan. Dr. Jones called and updated on patient's arrival. Provider planning on evaluating bedside.

## 2024-07-10 NOTE — PROGRESS NOTES
Patient general condition appears clinically stable, sanitary napkin checked no active bleeding observed. Dark red blood noted on sanitary napkin. Reported fetal movements, had consult with resident OB Dr Mazin RAMOS. Denies blurred vision headache and epigastric discomfort.

## 2024-07-10 NOTE — PROGRESS NOTES
Patient in bed with eyes closed, symmetrical rise and fall of chest seen;  easily aroused. VSS, no pain reported. Reported fetal movements, no active vaginal bleeding reported.

## 2024-07-10 NOTE — PROGRESS NOTES
Notified Myra CHUNG (OB Resident) to modify pt insulin orders. Sliding scale should be for 2hrs after meals not before meals. She will change.

## 2024-07-10 NOTE — UTILIZATION REVIEW
"Admission Status; Secondary Review Determination       Under the authority of the Utilization Management Committee, the utilization review process indicated a secondary review on the above patient.  The review outcome is based on review of the medical records, discussions with staff, and applying clinical experience noted on the date of the review.          (x) Observation Status Appropriate - This patient does not meet hospital inpatient criteria and is placed in observation status. If this patient's primary payer is Medicare and was admitted as an inpatient, Condition Code 44 should be used and patient status changed to \"observation\".       RATIONALE FOR DETERMINATION     38 yo female currently pregnancy female who presents to the ED with recurrent vaginal bleeding.  No re-occurence of bleeding overnight; will be discharging later today with close outpt clinic f/up. Vitals stable for medical discharge.     The severity of illness, intensity of service provided, expected LOS and risk for adverse outcome make the care appropriate for further observation; however, doesn't meet criteria for hospital inpatient admission. Dr Anderson notified of this determination, awaiting call back.         The information on this document is developed by the utilization review team in order for the business office to ensure compliance.  This only denotes the appropriateness of proper admission status and does not reflect the quality of care rendered.         The definitions of Inpatient Status and Observation Status used in making the determination above are those provided in the CMS Coverage Manual, Chapter 1 and Chapter 6, section 70.4.        Sincerely,    Ashley Ngo, DO  Utilization Review  Physician Advisor  [facility]  "

## 2024-07-10 NOTE — CONSULTS
Maternal-Fetal Medicine Consultation    Melissa Albert  : 1985  MRN: [id number]    REFERRAL:  Melissa Albert is a 39 year old who is admitted to the [facility] inpatient service    HPI:  Melissa Albert is a 39 year old  at 33w5d by ETD who is currently admitted to the inpatient antepartum service due to recurrent vaginal bleeding.  Her pregnancy is otherwise complicated by: conception via IVF, failed cell free DNA, pre-gestational diabetes mellitus, and advanced maternal age.    Melissa has had episodes of recurrent vaginal bleeding this pregnancy. The first episode occurred at 29w3d. She had been working in the kitchen when she noticed a gush of bright red bleeding.  She did not have any major symptoms at that time. Ultrasound at that time showed a subchorionic hemorrhage.  Given quantity of bleeding a marginal placental abruption was suspected. Given gestational age, she received a course of betamethasone.  She was discharged at 30w0d after remaining stable during a period of observation.    At 32w2d, she had a second episode of bleeding.  The patient describes the passage of a large dark red/brown clot.  After the passage of the clot, her bleeding again stabilized and she was discharged to home.    Yesterday, at 33w4d, she noted the passage of a small dark clot (<quarter size). There was no evidence of bright red blood. There was no other significant bleeding noted. Today, she endorses just pink tinged discharge and a light pink tint to her urine.  She denies any other symptoms, including abdominal pain, cramping, contractions, leakage of fluid, or decreased fetal movement.  No preceding trauma or falls to abdomen. The fetal status has remained reassuring throughout admission and there has been no evidence of contractions.    Of note, Melissa was also noted to have several mild range blood pressures during her admission. HELLP labs were noted to be within normal limits. No UPC was obtained. She also denies any  headache, changes in vision, chest pain, shortness of breath, RUQ pain, or worsening edema. She has a history of postpartum preeclampsia, but states she does not feel similar to the time of her diagnosis.      Obstetrics History:  OB History    Para Term  AB Living   3 1 1 0 1 1   SAB IAB Ectopic Multiple Live Births   0 0 0 0 1      # Outcome Date GA Lbr Elan/2nd Weight Sex Type Anes PTL Lv   3 Current            2 AB 2023     AB, MISSED      1 Term 22 37w1d  3.73 kg (8 lb 3.6 oz) M CS-LTranv   BRAYAN      Name: SIMON WILDE-EMILY      Apgar1: 6  Apgar5: 8     Past Medical History:  Past Medical History:   Diagnosis Date    Depressive disorder     Diabetes (H)     Joint pain     Uncomplicated asthma        Past Surgical History:  Past Surgical History:   Procedure Laterality Date    CARPAL TUNNEL RELEASE RT/LT Bilateral     both done in      SECTION N/A 2022    Procedure:  SECTION;  Surgeon: Oliva Hollingsworth MD;  Location: [facility] L+D    CHOLECYSTECTOMY      DILATION AND CURETTAGE N/A 2023    Procedure: SUCTION DILATION AND CURETTAGE;  Surgeon: Shirley Lund MD;  Location: [facility] Main OR    LAPAROSCOPY DIAGNOSTIC (GYN)  2018    Procedure: LAPAROSCOPY DIAGNOSTIC (GYN);  Diagnostic Laparoscopy with conversion to laparotomy with removal of incarcerated fibroid;  Surgeon: Makayla Damian DO;  Location: [facility] OR    LAPAROTOMY EXPLORATORY N/A 2018    Procedure: LAPAROTOMY EXPLORATORY;;  Surgeon: Makayla Damian DO;  Location: [facility] OR    LUMBAR PUNCTURE FLUORO GUIDED DIAGNOSTIC  2019    RELEASE TRIGGER FINGER Left 2024    Procedure: RELEASE, LEFT TRIGGER THUMB;  Surgeon: Tarik Peraza MD;  Location: [facility] OR       Current Medications:  Prior to Admission medications    Medication Sig Last Dose Taking? Auth Provider Long Term End Date   aspirin 81 MG EC tablet Take 81 mg by mouth daily 2024 Yes Reported, Patient     ferrous  sulfate (FEROSUL) 325 (65 Fe) MG tablet Take 325 mg by mouth 2 times daily 7/9/2024 Yes Reported, Patient No    hydrOXYzine (VISTARIL) 50 MG capsule Take 1 capsule (50 mg) by mouth 3 times daily as needed for itching 7/9/2024 Yes Mayte Adhikari MD     Insulin Aspart FlexPen 100 UNIT/ML SOPN 1 unit per 5g of carb and 1 unit for every 5 over 120. Max daily dose of 75 units. 7/8/2024 Yes Reported, Patient     insulin glargine (LANTUS PEN) 100 UNIT/ML pen Inject 120 Units Subcutaneous at bedtime Every three days if fasting BG is not below 95, increase by 10units (per patient) 7/9/2024 Yes Rahul Horner MD Yes    insulin lispro (HUMALOG KWIKPEN) 100 UNIT/ML (1 unit dial) KWIKPEN Inject 1 Units Subcutaneous 4 times daily (with meals and nightly) 7/9/2024 Yes Michelet Retana MD Yes    insulin lispro (HUMALOG KWIKPEN) 100 UNIT/ML (1 unit dial) KWIKPEN Inject 1 Units Subcutaneous 4 times daily (with meals and nightly) 1 UNIT FOR EVERY 5 . 7/9/2024 Yes Reported, Patient Yes    insulin lispro (HUMALOG KWIKPEN) 100 UNIT/ML (1 unit dial) KWIKPEN Inject 1 units/carb unit Subcutaneous 4 times daily (with meals and nightly) 1 unit of insulin per every 5 grams of CHO 7/8/2024 Yes Reported, Patient Yes    metoclopramide (REGLAN) 10 MG tablet Take 10 mg by mouth 4 times daily as needed More than a month Yes Reported, Patient     ondansetron (ZOFRAN ODT) 4 MG ODT tab dissolve 1 tablet on the tongue every 8 hours as needed for nausea More than a month Yes Reported, Patient     ondansetron (ZOFRAN) 4 MG tablet Take 4 mg by mouth every 8 hours as needed for nausea More than a month Yes Reported, Patient     Prenatal MV-Min-Fe Fum-FA-DHA (PRENATAL 1 PO)  7/9/2024 Yes Reported, Patient     promethazine (PHENERGAN) 25 MG tablet Take 25 mg by mouth every 6 hours as needed for nausea More than a month Yes Reported, Patient     sennosides (SENOKOT) 8.6 MG tablet Take 2 tablets by mouth daily 7/9/2024 Yes Reported, Patient      Continuous Blood Gluc Transmit (DEXCOM G6 TRANSMITTER) MISC Change every 3 months.   Reported, Patient     metFORMIN (GLUCOPHAGE XR) 500 MG 24 hr tablet    Reported, Patient Yes 7/30/22       Allergies:  Nitrates, organic and Nsaids    Social History:   Social History     Socioeconomic History    Marital status:      Spouse name: Not on file    Number of children: Not on file    Years of education: Not on file    Highest education level: Not on file   Occupational History    Not on file   Tobacco Use    Smoking status: Never     Passive exposure: Never    Smokeless tobacco: Never   Vaping Use    Vaping status: Never Used   Substance and Sexual Activity    Alcohol use: Not Currently    Drug use: No    Sexual activity: Yes   Other Topics Concern    Not on file   Social History Narrative    Not on file     Social Determinants of Health     Financial Resource Strain: Low Risk  (10/6/2023)    Financial Resource Strain     Within the past 12 months, have you or your family members you live with been unable to get utilities (heat, electricity) when it was really needed?: No   Food Insecurity: Low Risk  (10/6/2023)    Food Insecurity     Within the past 12 months, did you worry that your food would run out before you got money to buy more?: No     Within the past 12 months, did the food you bought just not last and you didn t have money to get more?: No   Transportation Needs: Low Risk  (10/6/2023)    Transportation Needs     Within the past 12 months, has lack of transportation kept you from medical appointments, getting your medicines, non-medical meetings or appointments, work, or from getting things that you need?: No   Physical Activity: Not on file   Stress: Not on file   Social Connections: Unknown (12/28/2021)    Received from [facility], [facility]    Social Connections     Frequency of Communication with Friends and Family: Not on file  "  Interpersonal Safety: Low Risk  (10/6/2023)    Interpersonal Safety     Do you feel physically and emotionally safe where you currently live?: Yes     Within the past 12 months, have you been hit, slapped, kicked or otherwise physically hurt by someone?: No     Within the past 12 months, have you been humiliated or emotionally abused in other ways by your partner or ex-partner?: No   Housing Stability: Low Risk  (10/6/2023)    Housing Stability     Do you have housing? : Yes     Are you worried about losing your housing?: No        Family History:  History reviewed. No pertinent family history.     ROS:  10-point ROS negative except as in HPI     PHYSICAL EXAM:  BP (!) 142/74 (BP Location: Left arm, Patient Position: Semi-Talamantes's, Cuff Size: Adult Large)   Pulse 105   Temp 98.2  F (36.8  C) (Oral)   Resp 18   Ht 1.6 m (5' 3\")   Wt 120.7 kg (266 lb)   LMP 2023 (Exact Date)   SpO2 95%   BMI 47.12 kg/m       General: NAD, A&Ox3  CV: Regular rate  Pulm: Normal respiratory effort  Abd: Gravid  Ext: No edema      Imaging:   Please see \"Imaging\" tab under \"Chart Review\" for details of today's US at the [facility].     ASSESSMENT/PLAN:  Melissa Albert is a 39 year old  at 33w5d by ETD who is currently admitted to the inpatient antepartum service due to recurrent vaginal bleeding.  Her pregnancy is otherwise complicated by: conception via IVF, failed cell free DNA, pre-gestational diabetes mellitus, and advanced maternal age.    Vaginal bleeding  Previous concern for marginal placental abruption  - Recurrent vaginal bleeding in pregnancy.  On review of patient history, she describes two significant episodes of vaginal bleeding only one of which contained bright red blood.  Her second and third admission refer to the passage of dark red blood, which suggests the passage of clot as it begins to resolve.  The most recent episode is very minimal and again dark and she has remained asymptomatic " throughout her admission thus far.  She has remained hemodynamically stable and she remains asymptomatic.  Fetal status is reassuring and s/p betamethasone in early pregnancy. This suggests no new subsequent event, but rather passage of resolving clot.    - Given clinical stability and close proximity to delivery hospital, no alteration in delivery planning is currently recommended.  Precautions were reviewed.  - We did discuss that if she were to have recurrent bright red bleeding and/or any signs of instability (ie abdominal pain, cramping/contractions, non-reassuring fetal status), delivery could be considered between 57n0d-14p5x.    Recommendations:  - Close outpatient surveillance  - Continue twice weekly  surveillance and serial growth assessments  - Repeat  delivery at 37 weeks gestation given h/o myomectomy    Elevated blood pressures without diagnosis in pregnancy  - Several mild range blood pressures noted throughout her admission.  Laboratory evaluation has been reassuring.  Meets criteria for diagnosis of gestational hypertension.    Recommendations:  - Urine to protein creatinine ratio.  Straight catheterization preferred given recent passage of blood. Melissa would like to avoid straight catheterization if possible and therefore wishes to delay until tomorrow when she has a scheduled prenatal visit in hopes that pink tinged discharge will resolve.  If positive UPC would confirm with straight catheterization and she is understanding of this plan.  - Continue with twice weekly  surveillance and serial growth assessments (Every 3 weeks).  - Continue close surveillance for elevated blood pressures and preeclampsia symptoms.  - Weekly HELLP labs.    Thank you for allowing us to participate in the care of your patient. Please do not hesitate to contact us if you have further questions regarding the management of your patient.     I personally spent a total of 45 minutes, including both  face-to-face and non-face-to-face on the date of the encounter, addressing the above diagnosis. Activities performed in this time include chart review, obtaining / reviewing history, performing a medically necessary evaluation, documentation and counseling/care coordination/ordering tests/ordering meds.      Tatiana Retana MD  Maternal Fetal Medicine   Date of Service (when I saw the patient): 7/10/2024

## 2024-07-10 NOTE — PROGRESS NOTES
Updated Dr. Solomon with [facility] recommendation to discharge pt. She will have Dr. Anderson see the pt and do the discharge if he is not available to do it she will come over her lunch period.    Per Dr. Retana ([facility]) BPP 8/8. No notes written yet.

## 2024-07-10 NOTE — H&P
"Date: 2024  Time: 11:53 PM    Admission H&P  Melissa Albert,  1985, MRN [id number]    PCP: Pablito Jacobo, [phone]  Primary OB: Dr Lund   Code status:  Full Code              Chief Complaint: Vaginal Bleeding     OBSTETRICAL / DATING HISTORY:  Estimated Date of Delivery: Estimated Date of Delivery: Aug 23, 2024  Gestational Age: 33w4d    OB History    Para Term  AB Living   3 1 1 0 1 1   SAB IAB Ectopic Multiple Live Births   0 0 0 0 1      # Outcome Date GA Lbr Elan/2nd Weight Sex Type Anes PTL Lv   3 Current            2 AB 2023     AB, MISSED      1 Term 22 37w1d  3.73 kg (8 lb 3.6 oz) M CS-LTranv   BRAYAN      Name: ANDRYMALE-MELISSA      Apgar1: 6  Apgar5: 8       HPI:    Melissa Albert is a 39 year old year old at 33w4d weeks. She presented to L&D for vaginal bleeding.  This is her third episode of bleeding this pregnancy.      She was previously admitted at 29 weeks, she did receive betamethasone at that time. She was admitted Mis 10-.      She was admitted a second time on , subsequently discharged on . Bleeding had been thought to be secondary to SHAUNA/partial abruption which appeared to resolve on MFM US prior to discharge.    Patient reports that both prior episodes were significant for a big gush of blood.  Tonight she also had a little bleeding.  This as mostly dark red/brown.  Did not \"gush\" but she noticed a little blood onto her pad.    She is a known type 2 diabetic.  Follows with endocrine at [facility] but last note was from May.  She mostly adjusts her insulin herself.  Reports at home she takes 120 units at bedtime.  With meals she takes a 1:5 carb ratio and then corrects with 1 unit per 5 above 120 based on her 2-hour accucheck.    She delivers via planned  at 36-37 weeks due to prior myomectomy.  She is scheduled for repeat  ar 37 weeks, her preference has been to minimize NICU length.      Medical History  Past Medical History: "   Diagnosis Date    Depressive disorder     Diabetes (H)     Joint pain     Uncomplicated asthma        Surgical History  Past Surgical History:   Procedure Laterality Date    CARPAL TUNNEL RELEASE RT/LT Bilateral 2014    both done in      SECTION N/A 2022    Procedure:  SECTION;  Surgeon: Oliva Hollingsworth MD;  Location: [facility] L+D    CHOLECYSTECTOMY      DILATION AND CURETTAGE N/A 2023    Procedure: SUCTION DILATION AND CURETTAGE;  Surgeon: Shirley Lund MD;  Location: [facility] Main OR    LAPAROSCOPY DIAGNOSTIC (GYN)  2018    Procedure: LAPAROSCOPY DIAGNOSTIC (GYN);  Diagnostic Laparoscopy with conversion to laparotomy with removal of incarcerated fibroid;  Surgeon: Makayla Damian DO;  Location: [facility] OR    LAPAROTOMY EXPLORATORY N/A 2018    Procedure: LAPAROTOMY EXPLORATORY;;  Surgeon: Makayla Damian DO;  Location: [facility] OR    LUMBAR PUNCTURE FLUORO GUIDED DIAGNOSTIC  2019    RELEASE TRIGGER FINGER Left 2024    Procedure: RELEASE, LEFT TRIGGER THUMB;  Surgeon: Tarik Peraza MD;  [place] [facility] [place]       Social History  Social History     Socioeconomic History    Marital status:      Spouse name: Not on file    Number of children: Not on file    Years of education: Not on file    Highest education level: Not on file   Occupational History    Not on file   Tobacco Use    Smoking status: Never     Passive exposure: Never    Smokeless tobacco: Never   Vaping Use    Vaping status: Never Used   Substance and Sexual Activity    Alcohol use: Not Currently    Drug use: No    Sexual activity: Yes   Other Topics Concern    Not on file   Social History Narrative    Not on file     Social Determinants of Health     Financial Resource Strain: Low Risk  (10/6/2023)    Financial Resource Strain     Within the past 12 months, have you or your family members you live with been unable to get utilities (heat, electricity) when it was really needed?: No    Food Insecurity: Low Risk  (10/6/2023)    Food Insecurity     Within the past 12 months, did you worry that your food would run out before you got money to buy more?: No     Within the past 12 months, did the food you bought just not last and you didn t have money to get more?: No   Transportation Needs: Low Risk  (10/6/2023)    Transportation Needs     Within the past 12 months, has lack of transportation kept you from medical appointments, getting your medicines, non-medical meetings or appointments, work, or from getting things that you need?: No   Physical Activity: Not on file   Stress: Not on file   Social Connections: Unknown (12/28/2021)    [facility] [employer] [facility]    Social Connections     Frequency of Communication with Friends and Family: Not on file   Interpersonal Safety: Low Risk  (10/6/2023)    Interpersonal Safety     Do you feel physically and emotionally safe where you currently live?: Yes     Within the past 12 months, have you been hit, slapped, kicked or otherwise physically hurt by someone?: No     Within the past 12 months, have you been humiliated or emotionally abused in other ways by your partner or ex-partner?: No   Housing Stability: Low Risk  (10/6/2023)    Housing Stability     Do you have housing? : Yes     Are you worried about losing your housing?: No       Allergies   Allergen Reactions    Nitrates, Organic Other (See Comments)     Headache, nausea    Nsaids GI Disturbance       Medications Prior to Admission   Medication Sig Dispense Refill Last Dose    aspirin 81 MG EC tablet Take 81 mg by mouth daily   7/9/2024    ferrous sulfate (FEROSUL) 325 (65 Fe) MG tablet Take 325 mg by mouth 2 times daily   7/9/2024    hydrOXYzine (VISTARIL) 50 MG capsule Take 1 capsule (50 mg) by mouth 3 times daily as needed for itching 30 capsule 0 7/9/2024    Insulin Aspart FlexPen 100 UNIT/ML SOPN 1 unit per 5g of carb  "and 1 unit for every 5 over 120. Max daily dose of 75 units.   7/8/2024    insulin glargine (LANTUS PEN) 100 UNIT/ML pen Inject 120 Units Subcutaneous at bedtime Every three days if fasting BG is not below 95, increase by 10units (per patient) 15 mL 1 7/9/2024    insulin lispro (HUMALOG KWIKPEN) 100 UNIT/ML (1 unit dial) KWIKPEN Inject 1 Units Subcutaneous 4 times daily (with meals and nightly) 15 mL 0 7/9/2024    insulin lispro (HUMALOG KWIKPEN) 100 UNIT/ML (1 unit dial) KWIKPEN Inject 1 Units Subcutaneous 4 times daily (with meals and nightly) 1 UNIT FOR EVERY 5 .   7/9/2024    insulin lispro (HUMALOG KWIKPEN) 100 UNIT/ML (1 unit dial) KWIKPEN Inject 1 units/carb unit Subcutaneous 4 times daily (with meals and nightly) 1 unit of insulin per every 5 grams of CHO   7/8/2024    metoclopramide (REGLAN) 10 MG tablet Take 10 mg by mouth 4 times daily as needed   More than a month    ondansetron (ZOFRAN ODT) 4 MG ODT tab dissolve 1 tablet on the tongue every 8 hours as needed for nausea   More than a month    ondansetron (ZOFRAN) 4 MG tablet Take 4 mg by mouth every 8 hours as needed for nausea   More than a month    Prenatal MV-Min-Fe Fum-FA-DHA (PRENATAL 1 PO)    7/9/2024    promethazine (PHENERGAN) 25 MG tablet Take 25 mg by mouth every 6 hours as needed for nausea   More than a month    sennosides (SENOKOT) 8.6 MG tablet Take 2 tablets by mouth daily   7/9/2024    Continuous Blood Gluc Transmit (DEXCOM G6 TRANSMITTER) MISC Change every 3 months.          Review of Systems:  Pertinent items are noted in HPI.    Physical Exam:  Temp:  [98.6  F (37  C)] 98.6  F (37  C)  Pulse:  [112] 112  Resp:  [16] 16  BP: (142)/(65) 142/65  SpO2:  [95 %] 95 %    BP (!) 142/65 (BP Location: Left arm, Patient Position: Semi-Talamantes's, Cuff Size: Adult Large)   Pulse 112   Temp 98.6  F (37  C) (Oral)   Resp 16   Ht 1.6 m (5' 3\")   Wt 120.7 kg (266 lb)   LMP 09/16/2023 (Exact Date)   SpO2 95%   BMI 47.12 kg/m    General: " NAD  CV: RRR  Lungs: clear  Abdomen: soft, gravid    NST reactive    SSE: cervix visualized, no active bleeding but dark red/brown blood noted to be at her cervical os.  Cervix appears visually 1 cm, on SVE 1 cm dilated but long    Pertinent Labs  Admission on 06/29/2024, Discharged on 07/02/2024   Component Date Value Ref Range Status    GLUCOSE BY METER POCT 06/29/2024 110 (H)  70 - 99 mg/dL Final    WBC Count 06/29/2024 12.0 (H)  4.0 - 11.0 10e3/uL Final    RBC Count 06/29/2024 3.80  3.80 - 5.20 10e6/uL Final    Hemoglobin 06/29/2024 10.2 (L)  11.7 - 15.7 g/dL Final    Hematocrit 06/29/2024 32.8 (L)  35.0 - 47.0 % Final    MCV 06/29/2024 86  78 - 100 fL Final    MCH 06/29/2024 26.8  26.5 - 33.0 pg Final    MCHC 06/29/2024 31.1 (L)  31.5 - 36.5 g/dL Final    RDW 06/29/2024 22.9 (H)  10.0 - 15.0 % Final    Platelet Count 06/29/2024 313  150 - 450 10e3/uL Final    % Neutrophils 06/29/2024 75  % Final    % Lymphocytes 06/29/2024 21  % Final    % Monocytes 06/29/2024 3  % Final    % Eosinophils 06/29/2024 1  % Final    % Basophils 06/29/2024 0  % Final    % Immature Granulocytes 06/29/2024 1  % Final    NRBCs per 100 WBC 06/29/2024 0  <1 /100 Final    Absolute Neutrophils 06/29/2024 9.0 (H)  1.6 - 8.3 10e3/uL Final    Absolute Lymphocytes 06/29/2024 2.5  0.8 - 5.3 10e3/uL Final    Absolute Monocytes 06/29/2024 0.3  0.0 - 1.3 10e3/uL Final    Absolute Eosinophils 06/29/2024 0.1  0.0 - 0.7 10e3/uL Final    Absolute Basophils 06/29/2024 0.0  0.0 - 0.2 10e3/uL Final    Absolute Immature Granulocytes 06/29/2024 0.2  <=0.4 10e3/uL Final    Absolute NRBCs 06/29/2024 0.0  10e3/uL Final    ABO/RH(D) 06/29/2024 A POS   Final    Antibody Screen 06/29/2024 Negative  Negative Final    SPECIMEN EXPIRATION DATE 06/29/2024 [id number]   Final    Hold Specimen 06/29/2024 [facility]   Final    GLUCOSE BY METER POCT 06/29/2024 149 (H)  70 - 99 mg/dL Final    GLUCOSE BY METER POCT 06/29/2024 126 (H)  70 - 99 mg/dL Final    GLUCOSE BY METER  POCT 06/30/2024 73  70 - 99 mg/dL Final    GLUCOSE BY METER POCT 06/30/2024 91  70 - 99 mg/dL Final    GLUCOSE BY METER POCT 06/30/2024 98  70 - 99 mg/dL Final    GLUCOSE BY METER POCT 06/30/2024 97  70 - 99 mg/dL Final    GLUCOSE BY METER POCT 06/30/2024 83  70 - 99 mg/dL Final    GLUCOSE BY METER POCT 07/01/2024 68 (L)  70 - 99 mg/dL Final    GLUCOSE BY METER POCT 07/01/2024 89  70 - 99 mg/dL Final    GLUCOSE BY METER POCT 07/01/2024 103 (H)  70 - 99 mg/dL Final    GLUCOSE BY METER POCT 07/01/2024 78  70 - 99 mg/dL Final    GLUCOSE BY METER POCT 07/01/2024 119 (H)  70 - 99 mg/dL Final    GLUCOSE BY METER POCT 07/01/2024 76  70 - 99 mg/dL Final    GLUCOSE BY METER POCT 07/02/2024 69 (L)  70 - 99 mg/dL Final    GLUCOSE BY METER POCT 07/02/2024 87  70 - 99 mg/dL Final    GLUCOSE BY METER POCT 07/02/2024 118 (H)  70 - 99 mg/dL Final   No results displayed because visit has over 200 results.      Lab Requisition on 06/03/2024   Component Date Value Ref Range Status    WBC Count 06/03/2024 9.4  4.0 - 11.0 10e3/uL Final    RBC Count 06/03/2024 3.45 (L)  3.80 - 5.20 10e6/uL Final    Hemoglobin 06/03/2024 8.6 (L)  11.7 - 15.7 g/dL Final    Hematocrit 06/03/2024 28.7 (L)  35.0 - 47.0 % Final    MCV 06/03/2024 83  78 - 100 fL Final    MCH 06/03/2024 24.9 (L)  26.5 - 33.0 pg Final    MCHC 06/03/2024 30.0 (L)  31.5 - 36.5 g/dL Final    RDW 06/03/2024 17.7 (H)  10.0 - 15.0 % Final    Platelet Count 06/03/2024 324  150 - 450 10e3/uL Final    Ferritin 06/03/2024 6  6 - 175 ng/mL Final    Treponema Antibody Total 06/03/2024 Nonreactive  Nonreactive Final   Lab Requisition on 03/15/2024   Component Date Value Ref Range Status    Alpha Feto Protein 03/15/2024 29  ng/mL Final    MoM for AFP 03/15/2024 1.31   Final    AFP Interpretation 03/15/2024 Screen Neg   Final    Comment: INTERPRETATION: SCREEN NEGATIVE for open spina bifida                                Neural Tube Defects (NTD)   Negative                                                                  Pre-Test     Post-Test      Cutoff                                       Neural Tube Defects Risks   1:258        1:1250       1:250                                          Comments:                                                   The risk of an open neural tube defect is less than the  screening cut-off.                                                                                                      The information provided indicates that the patient had  insulin-dependent diabetes at the time of conception. Due to  diabetic status, the risk of neural tube defect is increased  4 times.    This test was developed and its performance characteristics   determined by [facility]. It has not been cleared or   approved by the US Food and Drug Administration. This test   was performed in a CLIA                            certified laboratory and is   intended for clinical purposes.    Maternal Age at Delivery 03/15/2024 39[age]3  yr Final    Patient Weight 03/15/2024 250.0 lbs.   Final    Estimated Due Date 03/15/2024 08-23-24   Final    Gestational Age Calculated 03/15/2024 17 wks, 0 days   Final    Dating 03/15/2024 Other   Final    Num of Fetuses 03/15/2024 [place]   Final    Maternal Race 03/15/2024 Nonblack   Final    Insulin Diabetic 03/15/2024 Yes   Final    Smoking Status 03/15/2024 No   Final    Fam History of NTD 03/15/2024 No   Final    Specimen Iteration 03/15/2024 See Note   Final    Initial sample  Performed By: [facility]  [address] [place] [id number]  : Aj Browning MD, PhD  IA Number: [id number]   Lab Requisition on 02/12/2024   Component Date Value Ref Range Status    Chlamydia Trachomatis 02/12/2024 Negative  Negative Final    Negative for C. trachomatis rRNA by transcription mediated amplification.   A negative result by transcription mediated amplification does not preclude the presence of infection because results  are dependent on proper and adequate collection, absence of inhibitors and sufficient rRNA to be detected.    Neisseria gonorrhoeae 02/12/2024 Negative  Negative Final    Negative for N. gonorrhoeae rRNA by transcription mediated amplification. A negative result by transcription mediated amplification does not preclude the presence of C. trachomatis infection because results are dependent on proper and adequate collection, absence of inhibitors and sufficient rRNA to be detected.    ALT 02/12/2024 13  0 - 50 U/L Final    AST 02/12/2024 13  0 - 45 U/L Final    Creatinine 02/12/2024 0.54  0.51 - 0.95 mg/dL Final    GFR Estimate 02/12/2024 >90  >60 mL/min/1.73m2 Final    Rubella Laurie IgG Instrument Value 02/12/2024 5.11  <0.90 Index Final    Rubella Antibody IgG 02/12/2024 Positive   Final    Suggests previous exposure or immunization and probable immunity.    Hepatitis C Antibody 02/12/2024 Nonreactive  Nonreactive Final    A nonreactive screening test result does not exclude the possibility of exposure to or infection with HCV. Nonreactive screening test results in individuals with prior exposure to HCV may be due to antibody levels below the limit of detection of this assay or lack of reactivity to the HCV antigens used in this assay. Patients with recent HCV infections (<3 months from time of exposure) may have false-negative HCV antibody results due to the time needed for seroconversion (average of 8 to 9 weeks).    Hepatitis B Surface Antigen 02/12/2024 Nonreactive  Nonreactive Final    HIV Antigen Antibody Combo 02/12/2024 Nonreactive  Nonreactive Final    Negative HIV-1/-2 antigen and antibody screening test results usually indicate the absence of HIV-1 and HIV-2 infection. However, such negative results do not rule-out acute HIV infection.  If acute HIV-1 or HIV-2 infection is suspected, detection of HIV-1 or HIV-2 RNA  is recommended.     Rapid Plasma Reagin 02/12/2024 Nonreactive  Nonreactive Final    Total  Protein Urine mg/dL 02/12/2024 13.2    mg/dL Final    The reference ranges have not been established in urine protein. The results should be integrated into the clinical context for interpretation.    Total Protein Urine mg/mg Creat 02/12/2024 0.14  0.00 - 0.20 mg/mg Cr Final    Creatinine Urine mg/dL 02/12/2024 97.7  mg/dL Final    The reference ranges have not been established in urine creatinine. The results should be integrated into the clinical context for interpretation.    WBC Count 02/12/2024 7.9  4.0 - 11.0 10e3/uL Final    RBC Count 02/12/2024 4.17  3.80 - 5.20 10e6/uL Final    Hemoglobin 02/12/2024 11.3 (L)  11.7 - 15.7 g/dL Final    Hematocrit 02/12/2024 35.9  35.0 - 47.0 % Final    MCV 02/12/2024 86  78 - 100 fL Final    MCH 02/12/2024 27.1  26.5 - 33.0 pg Final    MCHC 02/12/2024 31.5  31.5 - 36.5 g/dL Final    RDW 02/12/2024 16.0 (H)  10.0 - 15.0 % Final    Platelet Count 02/12/2024 427  150 - 450 10e3/uL Final    % Neutrophils 02/12/2024 66  % Final    % Lymphocytes 02/12/2024 29  % Final    % Monocytes 02/12/2024 3  % Final    % Eosinophils 02/12/2024 1  % Final    % Basophils 02/12/2024 0  % Final    % Immature Granulocytes 02/12/2024 1  % Final    NRBCs per 100 WBC 02/12/2024 0  <1 /100 Final    Absolute Neutrophils 02/12/2024 5.2  1.6 - 8.3 10e3/uL Final    Absolute Lymphocytes 02/12/2024 2.3  0.8 - 5.3 10e3/uL Final    Absolute Monocytes 02/12/2024 0.2  0.0 - 1.3 10e3/uL Final    Absolute Eosinophils 02/12/2024 0.1  0.0 - 0.7 10e3/uL Final    Absolute Basophils 02/12/2024 0.0  0.0 - 0.2 10e3/uL Final    Absolute Immature Granulocytes 02/12/2024 0.0  <=0.4 10e3/uL Final    Absolute NRBCs 02/12/2024 0.0  10e3/uL Final    ABO/RH(D) 02/12/2024 A POS   Final    Antibody Screen 02/12/2024 Negative  Negative Final    SPECIMEN EXPIRATION DATE 02/12/2024 [id number]   Final       Pertinent Radiology:       Impression/Plan:  1. IUP at 33w4d weeks, vaginal bleeding, 3rd bleed this pregnancy  -Bleeding  is lighter than prior admissions, but present on exam but blood is present on exam  -Discussed recommendation for admission now for observation  -She received 1 course of betamethasone at 29 weeks, >2 weeks ago.  Discussed that she is a candidate for rescue course of betamethasone since >2 weeks since last course and still <34 weeks gestation.  Discussed benefit of rescue beta is may help with fetal lung maturity if baby delivers , but risk is may worsen blood sugars.  Discussed would not be a candidate for rescue course of betamethasone after 34 weeks  -After discussion of risks/benefits of rescue course of betamethasone she voices her preference to hold off for now, if bleeding increases overnight then she may be interested in initiating course in AM  -Bleeding has been thought to be secondary to chronic abruption.   -Plan NST q shift while admitted  -Recommended IV access and T&S   2. Type 2 DM  -Patient follows with [facility] endocrine but mostly adjusts her own insulin.  Prior admissions has required dose adjustments first admission due to betamethasone, second admission notes that her insulin requirement decreased when diabetic diet was ordered for her.  She already took her night time insulin tonight, placed family medicine consult and discussed with resident insulin management per their service  3. Elevated BP:  -First BP elevated on admission.  She reports this sometimes occurs with stress.  Will check pre-eclampsia labs.      Provider: Mayte Jones MD    Date: 2024  Time: 11:53 PM    MADIE Kaiser 1985, MRN [id number]

## 2024-07-10 NOTE — PROGRESS NOTES
Report received from Makayla at bedside at 2309 hrs, patient lying in bed in lateral position conscious alert and oriented, denies abdominal or back pain. No headache reported when questioned, denies epigastric discomfort, blurred vision and RUQ pain, no nausea and vomiting observed nor reported.     2320 hrs Dr Robert Ramos at bedside interviewing and assessing patient.    2325 hrs Informed of elevated BP on arrival.    2327 hrs Speculum examination done by MD Dr Jones, no active bright red bleeding observed. Dark red blood observed.    2329 hrs digital examination done by Dr Robert RAMOS, reported cervix 1cm dilated and long.    Strip viewed by MD, Verbal orders received for additional 20 minutes of  continuous tracing then NST every shift.    MD will place orders for admit.

## 2024-07-11 DIAGNOSIS — O46.90 VAGINAL BLEEDING IN PREGNANCY: ICD-10-CM

## 2024-07-11 DIAGNOSIS — D50.8 OTHER IRON DEFICIENCY ANEMIA: Primary | ICD-10-CM

## 2024-07-11 RX ORDER — MEPERIDINE HYDROCHLORIDE 25 MG/ML
25 INJECTION INTRAMUSCULAR; INTRAVENOUS; SUBCUTANEOUS EVERY 30 MIN PRN
Status: CANCELLED | OUTPATIENT
Start: 2024-07-15

## 2024-07-11 RX ORDER — EPINEPHRINE 1 MG/ML
0.3 INJECTION, SOLUTION, CONCENTRATE INTRAVENOUS EVERY 5 MIN PRN
Status: CANCELLED | OUTPATIENT
Start: 2024-07-15

## 2024-07-11 RX ORDER — ALBUTEROL SULFATE 90 UG/1
1-2 AEROSOL, METERED RESPIRATORY (INHALATION)
Status: CANCELLED
Start: 2024-07-15

## 2024-07-11 RX ORDER — HEPARIN SODIUM (PORCINE) LOCK FLUSH IV SOLN 100 UNIT/ML 100 UNIT/ML
5 SOLUTION INTRAVENOUS
Status: CANCELLED | OUTPATIENT
Start: 2024-07-15

## 2024-07-11 RX ORDER — HEPARIN SODIUM,PORCINE 10 UNIT/ML
5-20 VIAL (ML) INTRAVENOUS DAILY PRN
Status: CANCELLED | OUTPATIENT
Start: 2024-07-15

## 2024-07-11 RX ORDER — ALBUTEROL SULFATE 0.83 MG/ML
2.5 SOLUTION RESPIRATORY (INHALATION)
Status: CANCELLED | OUTPATIENT
Start: 2024-07-15

## 2024-07-11 RX ORDER — DIPHENHYDRAMINE HYDROCHLORIDE 50 MG/ML
50 INJECTION INTRAMUSCULAR; INTRAVENOUS
Status: CANCELLED
Start: 2024-07-15

## 2024-07-13 NOTE — DISCHARGE SUMMARY
ANTEPARTUM DISCHARGE SUMMARY    Patient Name: Melissa Albert   YOB: 1985   Medical Record Number: [id number]     Primary Physician: Pablito Jacobo     Admission Date: 2024   Discharge date: 2024    Gestational age at discharge: 34w0d    Reason for admission:   vaginal bleeding  third trimester    Diagnosis:   vaginal bleeding   34 weeks  IDDM in pregnancy    Procedures completed while in hospital:  Ultrasound    Diagnostic imaging:  [unfilled]    Consults:  [facility]    HOSPITAL COURSE:   Melissa Albert is a 39 year old  female,  whose Estimated Date of Delivery: Aug 23, 2024.     Patient was admitted at 34 weeks gestation for vaginal bleeding.     Today patient is 34w0d  gestation. States she feels well and is ready for discharge.  Patient reports no new complaints. She reports good fetal movement today. She denies contractions, cramping, pelvic pressure, backache.  She denies vaginal bleeding and denies vaginal discharge.  She is voiding without difficulty. Has a normal appetite, is tolerating a general diet, and denies constipation. She denies headache, blurred vision, shortness of breath, chest pain or epigastric pain, nausea, and pain in her lower extremities.     She will be discharged today to home in good condition.     Recommendations going forward include Follow-up with Dr Lund.       DISPOSITION:   Home     DISCHARGE CONDITION: Good/Stable     DISCHARGE MEDICATIONS:      Review of your medicines        CONTINUE these medicines which have NOT CHANGED        Dose / Directions   aspirin 81 MG EC tablet      Dose: 81 mg  Take 81 mg by mouth daily  Refills: 0     Dexcom G6 Transmitter Misc      Change every 3 months.  Refills: 0     ferrous sulfate 325 (65 Fe) MG tablet  Commonly known as: FEROSUL  Indication: Anemia From Inadequate Iron in the Body      Dose: 325 mg  Take 325 mg by mouth 2 times daily  Refills: 0     hydrOXYzine 50 MG capsule  Commonly known as: VISTARIL  Used for:  Vaginal bleeding in pregnancy      Dose: 50 mg  Take 1 capsule (50 mg) by mouth 3 times daily as needed for itching  Quantity: 30 capsule  Refills: 0     Insulin Aspart FlexPen 100 UNIT/ML Sopn      1 unit per 5g of carb and 1 unit for every 5 over 120. Max daily dose of 75 units.  Refills: 0     insulin glargine 100 UNIT/ML pen  Commonly known as: LANTUS PEN  Indication: Type 2 Diabetes  Used for: Type 2 diabetes mellitus complicating pregnancy in third trimester, antepartum      Dose: 120 Units  Inject 120 Units Subcutaneous at bedtime Every three days if fasting BG is not below 95, increase by 10units (per patient)  Quantity: 15 mL  Refills: 1     * insulin lispro 100 UNIT/ML (1 unit dial) KWIKPEN  Commonly known as: HumaLOG KWIKPEN  Indication: Type 2 Diabetes      Dose: 1 units/carb unit  Inject 1 units/carb unit Subcutaneous 4 times daily (with meals and nightly) 1 unit of insulin per every 5 grams of CHO  Refills: 0     * insulin lispro 100 UNIT/ML (1 unit dial) KWIKPEN  Commonly known as: HumaLOG KWIKPEN  Used for: Type 2 diabetes mellitus without complication, without long-term current use of insulin (H)      Dose: 1 Units  Inject 1 Units Subcutaneous 4 times daily (with meals and nightly)  Quantity: 15 mL  Refills: 0     * HumaLOG KWIKpen 100 UNIT/ML (1 unit dial) KWIKPEN  Indication: Type 2 Diabetes  Generic drug: insulin lispro      Dose: 1 Units  Inject 1 Units Subcutaneous 4 times daily (with meals and nightly) 1 UNIT FOR EVERY 5 .  Refills: 0     metoclopramide 10 MG tablet  Commonly known as: REGLAN      Dose: 10 mg  Take 10 mg by mouth 4 times daily as needed  Refills: 0     ondansetron 4 MG ODT tab  Commonly known as: ZOFRAN ODT      dissolve 1 tablet on the tongue every 8 hours as needed for nausea  Refills: 0     ondansetron 4 MG tablet  Commonly known as: ZOFRAN  Indication: Nausea and Vomiting in Pregnancy      Dose: 4 mg  Take 4 mg by mouth every 8 hours as needed for nausea  Refills:  0     PRENATAL 1 PO      Refills: 0     promethazine 25 MG tablet  Commonly known as: PHENERGAN      Dose: 25 mg  Take 25 mg by mouth every 6 hours as needed for nausea  Refills: 0     sennosides 8.6 MG tablet  Commonly known as: SENOKOT      Dose: 2 tablet  Take 2 tablets by mouth daily  Refills: 0           * This list has 3 medication(s) that are the same as other medications prescribed for you. Read the directions carefully, and ask your doctor or other care provider to review them with you.                      DISCHARGE PLAN:   - Follow up with Dr Lund, in 1 weeks  - Take medication as prescribed   - Physical activity: As directed per provider  - Diet: Regular   - Medication: Please see MAR   - Warning signs discussed with patient about when to call the clinic/hospital   - Reviewed signs and symptoms of  third trimester bleeding .  - All questions and concerns were answered for the patient prior to discharge.     Adria Anderson MD     I saw the patient on the date of discharge   Total time spent for discharge on date of discharge: 20 minutes     Physician(s) in addition to primary physician who should receive a copy:   CC: Pablito Jacobo MD

## 2024-07-15 ENCOUNTER — LAB REQUISITION (OUTPATIENT)
Dept: LAB | Facility: CLINIC | Age: 39
End: 2024-07-15
Payer: COMMERCIAL

## 2024-07-15 DIAGNOSIS — Z34.80 ENCOUNTER FOR SUPERVISION OF OTHER NORMAL PREGNANCY, UNSPECIFIED TRIMESTER: ICD-10-CM

## 2024-07-15 PROCEDURE — 87653 STREP B DNA AMP PROBE: CPT | Mod: ORL | Performed by: OBSTETRICS & GYNECOLOGY

## 2024-07-16 LAB — GP B STREP DNA SPEC QL NAA+PROBE: NEGATIVE

## 2024-07-18 ENCOUNTER — LAB REQUISITION (OUTPATIENT)
Dept: LAB | Facility: CLINIC | Age: 39
End: 2024-07-18
Payer: COMMERCIAL

## 2024-07-18 DIAGNOSIS — R03.0 ELEVATED BLOOD-PRESSURE READING, WITHOUT DIAGNOSIS OF HYPERTENSION: ICD-10-CM

## 2024-07-18 LAB
ALBUMIN MFR UR ELPH: <6 MG/DL
CREAT UR-MCNC: 24.8 MG/DL
ERYTHROCYTE [DISTWIDTH] IN BLOOD BY AUTOMATED COUNT: 20.9 % (ref 10–15)
HCT VFR BLD AUTO: 32.6 % (ref 35–47)
HGB BLD-MCNC: 10.1 G/DL (ref 11.7–15.7)
MCH RBC QN AUTO: 27.7 PG (ref 26.5–33)
MCHC RBC AUTO-ENTMCNC: 31 G/DL (ref 31.5–36.5)
MCV RBC AUTO: 89 FL (ref 78–100)
PLATELET # BLD AUTO: 285 10E3/UL (ref 150–450)
PROT/CREAT 24H UR: NORMAL MG/G{CREAT}
RBC # BLD AUTO: 3.65 10E6/UL (ref 3.8–5.2)
WBC # BLD AUTO: 8.6 10E3/UL (ref 4–11)

## 2024-07-18 PROCEDURE — 80053 COMPREHEN METABOLIC PANEL: CPT | Mod: ORL | Performed by: OBSTETRICS & GYNECOLOGY

## 2024-07-18 PROCEDURE — 85027 COMPLETE CBC AUTOMATED: CPT | Mod: ORL | Performed by: OBSTETRICS & GYNECOLOGY

## 2024-07-18 PROCEDURE — 84156 ASSAY OF PROTEIN URINE: CPT | Mod: ORL | Performed by: OBSTETRICS & GYNECOLOGY

## 2024-07-19 ENCOUNTER — INFUSION THERAPY VISIT (OUTPATIENT)
Dept: INFUSION THERAPY | Facility: HOSPITAL | Age: 39
End: 2024-07-19
Payer: COMMERCIAL

## 2024-07-19 VITALS
SYSTOLIC BLOOD PRESSURE: 126 MMHG | OXYGEN SATURATION: 96 % | DIASTOLIC BLOOD PRESSURE: 65 MMHG | RESPIRATION RATE: 20 BRPM | HEART RATE: 108 BPM | TEMPERATURE: 98.2 F

## 2024-07-19 DIAGNOSIS — D50.8 OTHER IRON DEFICIENCY ANEMIA: Primary | ICD-10-CM

## 2024-07-19 DIAGNOSIS — O46.90 VAGINAL BLEEDING IN PREGNANCY: ICD-10-CM

## 2024-07-19 LAB
ALBUMIN SERPL BCG-MCNC: 3.5 G/DL (ref 3.5–5.2)
ALP SERPL-CCNC: 102 U/L (ref 40–150)
ALT SERPL W P-5'-P-CCNC: 7 U/L (ref 0–50)
ANION GAP SERPL CALCULATED.3IONS-SCNC: 8 MMOL/L (ref 7–15)
AST SERPL W P-5'-P-CCNC: 12 U/L (ref 0–45)
BILIRUB SERPL-MCNC: 0.2 MG/DL
BUN SERPL-MCNC: 4.3 MG/DL (ref 6–20)
CALCIUM SERPL-MCNC: 9.1 MG/DL (ref 8.8–10.4)
CHLORIDE SERPL-SCNC: 105 MMOL/L (ref 98–107)
CREAT SERPL-MCNC: 0.55 MG/DL (ref 0.51–0.95)
EGFRCR SERPLBLD CKD-EPI 2021: >90 ML/MIN/1.73M2
GLUCOSE SERPL-MCNC: 80 MG/DL (ref 70–99)
HCO3 SERPL-SCNC: 25 MMOL/L (ref 22–29)
POTASSIUM SERPL-SCNC: 4.7 MMOL/L (ref 3.4–5.3)
PROT SERPL-MCNC: 6.3 G/DL (ref 6.4–8.3)
SODIUM SERPL-SCNC: 138 MMOL/L (ref 135–145)

## 2024-07-19 PROCEDURE — 258N000003 HC RX IP 258 OP 636: Performed by: OBSTETRICS & GYNECOLOGY

## 2024-07-19 PROCEDURE — 96365 THER/PROPH/DIAG IV INF INIT: CPT

## 2024-07-19 PROCEDURE — 96366 THER/PROPH/DIAG IV INF ADDON: CPT

## 2024-07-19 PROCEDURE — 250N000011 HC RX IP 250 OP 636: Performed by: OBSTETRICS & GYNECOLOGY

## 2024-07-19 RX ORDER — HEPARIN SODIUM (PORCINE) LOCK FLUSH IV SOLN 100 UNIT/ML 100 UNIT/ML
5 SOLUTION INTRAVENOUS
Status: CANCELLED | OUTPATIENT
Start: 2024-07-19

## 2024-07-19 RX ORDER — MEPERIDINE HYDROCHLORIDE 25 MG/ML
25 INJECTION INTRAMUSCULAR; INTRAVENOUS; SUBCUTANEOUS EVERY 30 MIN PRN
Status: DISCONTINUED | OUTPATIENT
Start: 2024-07-19 | End: 2024-07-19

## 2024-07-19 RX ORDER — METHYLPREDNISOLONE SODIUM SUCCINATE 125 MG/2ML
125 INJECTION, POWDER, LYOPHILIZED, FOR SOLUTION INTRAMUSCULAR; INTRAVENOUS
Status: CANCELLED
Start: 2024-07-19

## 2024-07-19 RX ORDER — MEPERIDINE HYDROCHLORIDE 25 MG/ML
25 INJECTION INTRAMUSCULAR; INTRAVENOUS; SUBCUTANEOUS EVERY 30 MIN PRN
Status: CANCELLED | OUTPATIENT
Start: 2024-07-19

## 2024-07-19 RX ORDER — HEPARIN SODIUM (PORCINE) LOCK FLUSH IV SOLN 100 UNIT/ML 100 UNIT/ML
5 SOLUTION INTRAVENOUS
Status: DISCONTINUED | OUTPATIENT
Start: 2024-07-19 | End: 2024-07-19

## 2024-07-19 RX ORDER — METHYLPREDNISOLONE SODIUM SUCCINATE 125 MG/2ML
125 INJECTION, POWDER, LYOPHILIZED, FOR SOLUTION INTRAMUSCULAR; INTRAVENOUS
Status: DISCONTINUED | OUTPATIENT
Start: 2024-07-19 | End: 2024-07-19

## 2024-07-19 RX ORDER — ALBUTEROL SULFATE 0.83 MG/ML
2.5 SOLUTION RESPIRATORY (INHALATION)
Status: CANCELLED | OUTPATIENT
Start: 2024-07-19

## 2024-07-19 RX ORDER — EPINEPHRINE 1 MG/ML
0.3 INJECTION, SOLUTION INTRAMUSCULAR; SUBCUTANEOUS EVERY 5 MIN PRN
Status: DISCONTINUED | OUTPATIENT
Start: 2024-07-19 | End: 2024-07-19

## 2024-07-19 RX ORDER — ALBUTEROL SULFATE 0.83 MG/ML
2.5 SOLUTION RESPIRATORY (INHALATION)
Status: DISCONTINUED | OUTPATIENT
Start: 2024-07-19 | End: 2024-07-19

## 2024-07-19 RX ORDER — HEPARIN SODIUM,PORCINE 10 UNIT/ML
5-20 VIAL (ML) INTRAVENOUS DAILY PRN
Status: DISCONTINUED | OUTPATIENT
Start: 2024-07-19 | End: 2024-07-19

## 2024-07-19 RX ORDER — EPINEPHRINE 1 MG/ML
0.3 INJECTION, SOLUTION INTRAMUSCULAR; SUBCUTANEOUS EVERY 5 MIN PRN
Status: CANCELLED | OUTPATIENT
Start: 2024-07-19

## 2024-07-19 RX ORDER — HEPARIN SODIUM,PORCINE 10 UNIT/ML
5-20 VIAL (ML) INTRAVENOUS DAILY PRN
Status: CANCELLED | OUTPATIENT
Start: 2024-07-19

## 2024-07-19 RX ORDER — DIPHENHYDRAMINE HYDROCHLORIDE 50 MG/ML
50 INJECTION INTRAMUSCULAR; INTRAVENOUS
Status: CANCELLED
Start: 2024-07-19

## 2024-07-19 RX ORDER — DIPHENHYDRAMINE HYDROCHLORIDE 50 MG/ML
50 INJECTION INTRAMUSCULAR; INTRAVENOUS
Status: DISCONTINUED | OUTPATIENT
Start: 2024-07-19 | End: 2024-07-19

## 2024-07-19 RX ORDER — ALBUTEROL SULFATE 90 UG/1
1-2 AEROSOL, METERED RESPIRATORY (INHALATION)
Status: CANCELLED
Start: 2024-07-19

## 2024-07-19 RX ORDER — ALBUTEROL SULFATE 90 UG/1
1-2 AEROSOL, METERED RESPIRATORY (INHALATION)
Status: DISCONTINUED | OUTPATIENT
Start: 2024-07-19 | End: 2024-07-19

## 2024-07-19 RX ADMIN — SODIUM CHLORIDE 250 ML: 9 INJECTION, SOLUTION INTRAVENOUS at 09:45

## 2024-07-19 RX ADMIN — SODIUM CHLORIDE 1000 MG: 9 INJECTION, SOLUTION INTRAVENOUS at 10:11

## 2024-07-19 ASSESSMENT — PAIN SCALES - GENERAL: PAINLEVEL: NO PAIN (0)

## 2024-07-29 NOTE — H&P
[facility]    History and Physical       Date of Admission:  2024    History of Present Illness   Melissa Albert is a 39 year old  with intrauterine pregnancy at 37 weeks that presents to the birth center for scheduled repeat  section and bilateral salpingectomies.     OB COMPLICATIONS:  Prenatal course was complicated by  Type 2 DM on subQ insulin. She follows with Dr. Millan for endocrinology cares. Last A1c was 5.4  H/o pre-eclampsia  H/o PPH  H/o CS and h/o myomectomy  IVF pregnancy  Starting BMI 42  Placental abruption s/p BMZ and hospitalization x 2.  Anemia s/p IV iron   Desires sterilization   Posterior placenta.  Vertex on last BPP.  Last growth was 87%ile at 35+3 weeks.   GBS negative.     Past Medical History    Past Medical History:   Diagnosis Date    Depressive disorder     Diabetes (H)     Joint pain     Uncomplicated asthma        Past Surgical History   Past Surgical History:   Procedure Laterality Date    CARPAL TUNNEL RELEASE RT/LT Bilateral     both done in      SECTION N/A 2022    Procedure:  SECTION;  Surgeon: Oliva Hollingsworth MD;  [facility]: [facility]    CHOLECYSTECTOMY      DILATION AND CURETTAGE N/A 2023    Procedure: SUCTION DILATION AND CURETTAGE;  Surgeon: Shirley Lund MD;  Location: [facility] Main OR    LAPAROSCOPY DIAGNOSTIC (GYN)  2018    Procedure: LAPAROSCOPY DIAGNOSTIC (GYN);  Diagnostic Laparoscopy with conversion to laparotomy with removal of incarcerated fibroid;  Surgeon: Makayla Damian DO;  Location:  OR    LAPAROTOMY EXPLORATORY N/A 2018    Procedure: LAPAROTOMY EXPLORATORY;;  Surgeon: Makayla Damian DO;  Location: [facility] OR    LUMBAR PUNCTURE FLUORO GUIDED DIAGNOSTIC  2019    RELEASE TRIGGER FINGER Left 2024    Procedure: RELEASE, LEFT TRIGGER THUMB;  Surgeon: Tarik Peraza MD;  Location: [facility] OR       OB History    Para Term  AB Living    3 1 1 0 1 1   SAB IAB Ectopic Multiple Live Births   0 0 0 0 1      # Outcome Date GA Lbr Elan/2nd Weight Sex Type Anes PTL Lv   3 Current            2 AB 08/2023     AB, MISSED      1 Term 07/29/22 37w1d  3.73 kg (8 lb 3.6 oz) M CS-LTranv   BRAYAN      Name: SIMON WILDE-EMILY      Apgar1: 6  Apgar5: 8      Social History   Social History     Tobacco Use    Smoking status: Never     Passive exposure: Never    Smokeless tobacco: Never   Vaping Use    Vaping status: Never Used   Substance Use Topics    Alcohol use: Not Currently    Drug use: No      Family History   Non-contributory     Prior to Admission Medications   Current Outpatient Medications   Medication Sig Dispense Refill    aspirin 81 MG EC tablet Take 81 mg by mouth daily      Continuous Blood Gluc Transmit (DEXCOM G6 TRANSMITTER) MISC Change every 3 months.      ferrous sulfate (FEROSUL) 325 (65 Fe) MG tablet Take 325 mg by mouth 2 times daily      hydrOXYzine (VISTARIL) 50 MG capsule Take 1 capsule (50 mg) by mouth 3 times daily as needed for itching 30 capsule 0    Insulin Aspart FlexPen 100 UNIT/ML SOPN 1 unit per 5g of carb and 1 unit for every 5 over 120. Max daily dose of 75 units.      insulin glargine (LANTUS PEN) 100 UNIT/ML pen Inject 120 Units Subcutaneous at bedtime Every three days if fasting BG is not below 95, increase by 10units (per patient) 15 mL 1    insulin lispro (HUMALOG KWIKPEN) 100 UNIT/ML (1 unit dial) KWIKPEN Inject 1 Units Subcutaneous 4 times daily (with meals and nightly) 15 mL 0    insulin lispro (HUMALOG KWIKPEN) 100 UNIT/ML (1 unit dial) KWIKPEN Inject 1 Units Subcutaneous 4 times daily (with meals and nightly) 1 UNIT FOR EVERY 5 .      insulin lispro (HUMALOG KWIKPEN) 100 UNIT/ML (1 unit dial) KWIKPEN Inject 1 units/carb unit Subcutaneous 4 times daily (with meals and nightly) 1 unit of insulin per every 5 grams of CHO      metoclopramide (REGLAN) 10 MG tablet Take 10 mg by mouth 4 times daily as needed      ondansetron  (ZOFRAN ODT) 4 MG ODT tab dissolve 1 tablet on the tongue every 8 hours as needed for nausea      ondansetron (ZOFRAN) 4 MG tablet Take 4 mg by mouth every 8 hours as needed for nausea      Prenatal MV-Min-Fe Fum-FA-DHA (PRENATAL 1 PO)       promethazine (PHENERGAN) 25 MG tablet Take 25 mg by mouth every 6 hours as needed for nausea      sennosides (SENOKOT) 8.6 MG tablet Take 2 tablets by mouth daily         Allergies   Allergies   Allergen Reactions    Nitrates, Organic Other (See Comments)     Headache, nausea    Nsaids GI Disturbance       Physical Exam   Vital Signs pending   Gen: no acute distress, resting comfortably   CV: acyanotic   Heart: regular rate and rhythm   Pulm: unlabored respirations, clear to ausculation bilaterally    Abd: gravid, soft, nontender   Extremities: soft, nontender   Cervix: deferred     Recent Labs   Lab Test 24  2355 22  1139 18  0325   ABO  --   --  A   RH  --   --  Pos   AS Negative   < > Neg    < > = values in this interval not displayed.     Recent Labs   Lab Test 24  1052   HEPBANG Nonreactive   HIAGAB Nonreactive   RUQIGG Positive       Assessment & Plan   Melissa Albert is a 39 year old   with intrauterine pregnancy at 37 weeks here for repeat  section and bilateral salpingectomies.   Type 2 DM    PLAN:   Admit - see IP orders  Hemoglobin and type and screen.  Plan TXA prior to delivery.     Will stop insulin after delivery and do QID checks per endocrinology.     Risks, benefits and alternatives have been discussed with the patient.  Reviewed risks of bleeding, infection, and damage to surrounding organs.  Patient agrees to proceed.       Shirley Lund MD 2024 10:58 AM

## 2024-08-01 ENCOUNTER — ANESTHESIA EVENT (OUTPATIENT)
Dept: OBGYN | Facility: HOSPITAL | Age: 39
End: 2024-08-01
Payer: COMMERCIAL

## 2024-08-02 ENCOUNTER — HOSPITAL ENCOUNTER (INPATIENT)
Facility: HOSPITAL | Age: 39
LOS: 3 days | Discharge: HOME HEALTH | End: 2024-08-05
Attending: OBSTETRICS & GYNECOLOGY | Admitting: OBSTETRICS & GYNECOLOGY
Payer: COMMERCIAL

## 2024-08-02 ENCOUNTER — ANESTHESIA (OUTPATIENT)
Dept: OBGYN | Facility: HOSPITAL | Age: 39
End: 2024-08-02
Payer: COMMERCIAL

## 2024-08-02 DIAGNOSIS — Z98.891 S/P REPEAT LOW TRANSVERSE C-SECTION: Primary | ICD-10-CM

## 2024-08-02 DIAGNOSIS — Z98.891 STATUS POST REPEAT LOW TRANSVERSE CESAREAN SECTION: ICD-10-CM

## 2024-08-02 DIAGNOSIS — O13.3 GESTATIONAL HYPERTENSION, THIRD TRIMESTER: ICD-10-CM

## 2024-08-02 LAB
ABO/RH(D): NORMAL
ALBUMIN MFR UR ELPH: 30.4 MG/DL
ALBUMIN SERPL BCG-MCNC: 3 G/DL (ref 3.5–5.2)
ALP SERPL-CCNC: 95 U/L (ref 40–150)
ALT SERPL W P-5'-P-CCNC: 6 U/L (ref 0–50)
ANION GAP SERPL CALCULATED.3IONS-SCNC: 10 MMOL/L (ref 7–15)
ANTIBODY SCREEN: NEGATIVE
APTT PPP: 28 SECONDS (ref 22–38)
AST SERPL W P-5'-P-CCNC: 11 U/L (ref 0–45)
BILIRUB SERPL-MCNC: 0.2 MG/DL
BUN SERPL-MCNC: 4.6 MG/DL (ref 6–20)
CALCIUM SERPL-MCNC: 8.5 MG/DL (ref 8.8–10.4)
CHLORIDE SERPL-SCNC: 104 MMOL/L (ref 98–107)
CREAT SERPL-MCNC: 0.5 MG/DL (ref 0.51–0.95)
CREAT SERPL-MCNC: 0.57 MG/DL (ref 0.51–0.95)
CREAT UR-MCNC: 115.5 MG/DL
EGFRCR SERPLBLD CKD-EPI 2021: >90 ML/MIN/1.73M2
EGFRCR SERPLBLD CKD-EPI 2021: >90 ML/MIN/1.73M2
ERYTHROCYTE [DISTWIDTH] IN BLOOD BY AUTOMATED COUNT: 19.6 % (ref 10–15)
GLUCOSE BLDC GLUCOMTR-MCNC: 103 MG/DL (ref 70–99)
GLUCOSE BLDC GLUCOMTR-MCNC: 115 MG/DL (ref 70–99)
GLUCOSE BLDC GLUCOMTR-MCNC: 120 MG/DL (ref 70–99)
GLUCOSE BLDC GLUCOMTR-MCNC: 190 MG/DL (ref 70–99)
GLUCOSE BLDC GLUCOMTR-MCNC: 98 MG/DL (ref 70–99)
GLUCOSE SERPL-MCNC: 113 MG/DL (ref 70–99)
HCO3 SERPL-SCNC: 25 MMOL/L (ref 22–29)
HCT VFR BLD AUTO: 31.5 % (ref 35–47)
HGB BLD-MCNC: 10 G/DL (ref 11.7–15.7)
HGB BLD-MCNC: 10.6 G/DL (ref 11.7–15.7)
INR PPP: 1.04 (ref 0.85–1.15)
MCH RBC QN AUTO: 28 PG (ref 26.5–33)
MCHC RBC AUTO-ENTMCNC: 31.7 G/DL (ref 31.5–36.5)
MCV RBC AUTO: 88 FL (ref 78–100)
PLATELET # BLD AUTO: 226 10E3/UL (ref 150–450)
POTASSIUM SERPL-SCNC: 4.1 MMOL/L (ref 3.4–5.3)
PROT SERPL-MCNC: 5.6 G/DL (ref 6.4–8.3)
PROT/CREAT 24H UR: 0.26 MG/MG CR (ref 0–0.2)
RBC # BLD AUTO: 3.57 10E6/UL (ref 3.8–5.2)
SODIUM SERPL-SCNC: 139 MMOL/L (ref 135–145)
SPECIMEN EXPIRATION DATE: NORMAL
T PALLIDUM AB SER QL: NONREACTIVE
WBC # BLD AUTO: 9.7 10E3/UL (ref 4–11)

## 2024-08-02 PROCEDURE — 250N000011 HC RX IP 250 OP 636: Performed by: ANESTHESIOLOGY

## 2024-08-02 PROCEDURE — 250N000011 HC RX IP 250 OP 636: Performed by: NURSE ANESTHETIST, CERTIFIED REGISTERED

## 2024-08-02 PROCEDURE — 0UT70ZZ RESECTION OF BILATERAL FALLOPIAN TUBES, OPEN APPROACH: ICD-10-PCS | Performed by: OBSTETRICS & GYNECOLOGY

## 2024-08-02 PROCEDURE — 370N000017 HC ANESTHESIA TECHNICAL FEE, PER MIN: Performed by: OBSTETRICS & GYNECOLOGY

## 2024-08-02 PROCEDURE — 258N000003 HC RX IP 258 OP 636: Performed by: NURSE ANESTHETIST, CERTIFIED REGISTERED

## 2024-08-02 PROCEDURE — 250N000013 HC RX MED GY IP 250 OP 250 PS 637: Performed by: OBSTETRICS & GYNECOLOGY

## 2024-08-02 PROCEDURE — 999N000249 HC STATISTIC C-SECTION ON UNIT

## 2024-08-02 PROCEDURE — 82565 ASSAY OF CREATININE: CPT | Performed by: OBSTETRICS & GYNECOLOGY

## 2024-08-02 PROCEDURE — 250N000011 HC RX IP 250 OP 636: Performed by: OBSTETRICS & GYNECOLOGY

## 2024-08-02 PROCEDURE — 85730 THROMBOPLASTIN TIME PARTIAL: CPT | Performed by: OBSTETRICS & GYNECOLOGY

## 2024-08-02 PROCEDURE — 360N000076 HC SURGERY LEVEL 3, PER MIN: Performed by: OBSTETRICS & GYNECOLOGY

## 2024-08-02 PROCEDURE — 85027 COMPLETE CBC AUTOMATED: CPT | Performed by: OBSTETRICS & GYNECOLOGY

## 2024-08-02 PROCEDURE — 85018 HEMOGLOBIN: CPT | Performed by: OBSTETRICS & GYNECOLOGY

## 2024-08-02 PROCEDURE — C9290 INJ, BUPIVACAINE LIPOSOME: HCPCS | Performed by: ANESTHESIOLOGY

## 2024-08-02 PROCEDURE — 86900 BLOOD TYPING SEROLOGIC ABO: CPT | Performed by: OBSTETRICS & GYNECOLOGY

## 2024-08-02 PROCEDURE — 59510 CESAREAN DELIVERY: CPT | Performed by: NURSE ANESTHETIST, CERTIFIED REGISTERED

## 2024-08-02 PROCEDURE — 272N000001 HC OR GENERAL SUPPLY STERILE: Performed by: OBSTETRICS & GYNECOLOGY

## 2024-08-02 PROCEDURE — 86780 TREPONEMA PALLIDUM: CPT | Performed by: OBSTETRICS & GYNECOLOGY

## 2024-08-02 PROCEDURE — 84156 ASSAY OF PROTEIN URINE: CPT | Performed by: OBSTETRICS & GYNECOLOGY

## 2024-08-02 PROCEDURE — 88302 TISSUE EXAM BY PATHOLOGIST: CPT | Mod: TC | Performed by: OBSTETRICS & GYNECOLOGY

## 2024-08-02 PROCEDURE — 258N000003 HC RX IP 258 OP 636: Performed by: OBSTETRICS & GYNECOLOGY

## 2024-08-02 PROCEDURE — 120N000001 HC R&B MED SURG/OB

## 2024-08-02 PROCEDURE — 85610 PROTHROMBIN TIME: CPT | Performed by: OBSTETRICS & GYNECOLOGY

## 2024-08-02 PROCEDURE — 710N000010 HC RECOVERY PHASE 1, LEVEL 2, PER MIN: Performed by: OBSTETRICS & GYNECOLOGY

## 2024-08-02 PROCEDURE — 250N000009 HC RX 250: Performed by: NURSE ANESTHETIST, CERTIFIED REGISTERED

## 2024-08-02 PROCEDURE — 59510 CESAREAN DELIVERY: CPT | Performed by: ANESTHESIOLOGY

## 2024-08-02 PROCEDURE — 36415 COLL VENOUS BLD VENIPUNCTURE: CPT | Performed by: OBSTETRICS & GYNECOLOGY

## 2024-08-02 PROCEDURE — 88307 TISSUE EXAM BY PATHOLOGIST: CPT | Mod: TC | Performed by: OBSTETRICS & GYNECOLOGY

## 2024-08-02 PROCEDURE — 250N000009 HC RX 250: Performed by: OBSTETRICS & GYNECOLOGY

## 2024-08-02 RX ORDER — NICOTINE POLACRILEX 4 MG
15-30 LOZENGE BUCCAL
Status: DISCONTINUED | OUTPATIENT
Start: 2024-08-02 | End: 2024-08-03

## 2024-08-02 RX ORDER — LOPERAMIDE HCL 2 MG
2 CAPSULE ORAL
Status: DISCONTINUED | OUTPATIENT
Start: 2024-08-02 | End: 2024-08-05 | Stop reason: HOSPADM

## 2024-08-02 RX ORDER — BISACODYL 10 MG
10 SUPPOSITORY, RECTAL RECTAL DAILY PRN
Status: DISCONTINUED | OUTPATIENT
Start: 2024-08-04 | End: 2024-08-05 | Stop reason: HOSPADM

## 2024-08-02 RX ORDER — SODIUM CHLORIDE 9 MG/ML
INJECTION, SOLUTION INTRAVENOUS CONTINUOUS PRN
Status: DISCONTINUED | OUTPATIENT
Start: 2024-08-02 | End: 2024-08-05 | Stop reason: HOSPADM

## 2024-08-02 RX ORDER — DIPHENHYDRAMINE HCL 25 MG
25 CAPSULE ORAL EVERY 6 HOURS PRN
Status: DISCONTINUED | OUTPATIENT
Start: 2024-08-02 | End: 2024-08-05 | Stop reason: HOSPADM

## 2024-08-02 RX ORDER — AMOXICILLIN 250 MG
2 CAPSULE ORAL 2 TIMES DAILY
Status: DISCONTINUED | OUTPATIENT
Start: 2024-08-02 | End: 2024-08-05 | Stop reason: HOSPADM

## 2024-08-02 RX ORDER — ENOXAPARIN SODIUM 100 MG/ML
40 INJECTION SUBCUTANEOUS EVERY 12 HOURS
Status: DISCONTINUED | OUTPATIENT
Start: 2024-08-02 | End: 2024-08-05 | Stop reason: HOSPADM

## 2024-08-02 RX ORDER — ACETAMINOPHEN 325 MG/1
975 TABLET ORAL ONCE
Status: COMPLETED | OUTPATIENT
Start: 2024-08-02 | End: 2024-08-02

## 2024-08-02 RX ORDER — TRANEXAMIC ACID 10 MG/ML
1 INJECTION, SOLUTION INTRAVENOUS EVERY 30 MIN PRN
Status: DISCONTINUED | OUTPATIENT
Start: 2024-08-02 | End: 2024-08-05 | Stop reason: HOSPADM

## 2024-08-02 RX ORDER — MISOPROSTOL 200 UG/1
400 TABLET ORAL
Status: DISCONTINUED | OUTPATIENT
Start: 2024-08-02 | End: 2024-08-05 | Stop reason: HOSPADM

## 2024-08-02 RX ORDER — OXYTOCIN 10 [USP'U]/ML
10 INJECTION, SOLUTION INTRAMUSCULAR; INTRAVENOUS
Status: DISCONTINUED | OUTPATIENT
Start: 2024-08-02 | End: 2024-08-02 | Stop reason: HOSPADM

## 2024-08-02 RX ORDER — ONDANSETRON 2 MG/ML
4 INJECTION INTRAMUSCULAR; INTRAVENOUS EVERY 6 HOURS PRN
Status: DISCONTINUED | OUTPATIENT
Start: 2024-08-02 | End: 2024-08-05 | Stop reason: HOSPADM

## 2024-08-02 RX ORDER — LIDOCAINE 40 MG/G
CREAM TOPICAL
Status: DISCONTINUED | OUTPATIENT
Start: 2024-08-02 | End: 2024-08-02 | Stop reason: HOSPADM

## 2024-08-02 RX ORDER — SODIUM CHLORIDE, SODIUM LACTATE, POTASSIUM CHLORIDE, CALCIUM CHLORIDE 600; 310; 30; 20 MG/100ML; MG/100ML; MG/100ML; MG/100ML
INJECTION, SOLUTION INTRAVENOUS CONTINUOUS
Status: DISCONTINUED | OUTPATIENT
Start: 2024-08-02 | End: 2024-08-02 | Stop reason: HOSPADM

## 2024-08-02 RX ORDER — DEXTROSE MONOHYDRATE 25 G/50ML
25-50 INJECTION, SOLUTION INTRAVENOUS
Status: DISCONTINUED | OUTPATIENT
Start: 2024-08-02 | End: 2024-08-03

## 2024-08-02 RX ORDER — AMOXICILLIN 250 MG
1 CAPSULE ORAL 2 TIMES DAILY
Status: DISCONTINUED | OUTPATIENT
Start: 2024-08-02 | End: 2024-08-05 | Stop reason: HOSPADM

## 2024-08-02 RX ORDER — CEFAZOLIN SODIUM/WATER 3 G/30 ML
3 SYRINGE (ML) INTRAVENOUS
Status: DISCONTINUED | OUTPATIENT
Start: 2024-08-02 | End: 2024-08-02 | Stop reason: HOSPADM

## 2024-08-02 RX ORDER — MISOPROSTOL 200 UG/1
800 TABLET ORAL
Status: DISCONTINUED | OUTPATIENT
Start: 2024-08-02 | End: 2024-08-02 | Stop reason: HOSPADM

## 2024-08-02 RX ORDER — OXYTOCIN 10 [USP'U]/ML
10 INJECTION, SOLUTION INTRAMUSCULAR; INTRAVENOUS
Status: DISCONTINUED | OUTPATIENT
Start: 2024-08-02 | End: 2024-08-05 | Stop reason: HOSPADM

## 2024-08-02 RX ORDER — MODIFIED LANOLIN
OINTMENT (GRAM) TOPICAL
Status: DISCONTINUED | OUTPATIENT
Start: 2024-08-02 | End: 2024-08-05 | Stop reason: HOSPADM

## 2024-08-02 RX ORDER — BUPIVACAINE HYDROCHLORIDE 2.5 MG/ML
INJECTION, SOLUTION EPIDURAL; INFILTRATION; INTRACAUDAL
Status: COMPLETED | OUTPATIENT
Start: 2024-08-02 | End: 2024-08-02

## 2024-08-02 RX ORDER — LIDOCAINE 40 MG/G
CREAM TOPICAL
Status: DISCONTINUED | OUTPATIENT
Start: 2024-08-02 | End: 2024-08-05 | Stop reason: HOSPADM

## 2024-08-02 RX ORDER — ONDANSETRON 2 MG/ML
INJECTION INTRAMUSCULAR; INTRAVENOUS PRN
Status: DISCONTINUED | OUTPATIENT
Start: 2024-08-02 | End: 2024-08-02

## 2024-08-02 RX ORDER — LOPERAMIDE HCL 2 MG
4 CAPSULE ORAL
Status: DISCONTINUED | OUTPATIENT
Start: 2024-08-02 | End: 2024-08-02 | Stop reason: HOSPADM

## 2024-08-02 RX ORDER — OXYTOCIN/0.9 % SODIUM CHLORIDE 30/500 ML
PLASTIC BAG, INJECTION (ML) INTRAVENOUS CONTINUOUS PRN
Status: DISCONTINUED | OUTPATIENT
Start: 2024-08-02 | End: 2024-08-02

## 2024-08-02 RX ORDER — GLYCOPYRROLATE 0.2 MG/ML
INJECTION, SOLUTION INTRAMUSCULAR; INTRAVENOUS PRN
Status: DISCONTINUED | OUTPATIENT
Start: 2024-08-02 | End: 2024-08-02

## 2024-08-02 RX ORDER — METHYLERGONOVINE MALEATE 0.2 MG/ML
200 INJECTION INTRAVENOUS
Status: DISCONTINUED | OUTPATIENT
Start: 2024-08-02 | End: 2024-08-02 | Stop reason: HOSPADM

## 2024-08-02 RX ORDER — OXYTOCIN/0.9 % SODIUM CHLORIDE 30/500 ML
340 PLASTIC BAG, INJECTION (ML) INTRAVENOUS CONTINUOUS PRN
Status: DISCONTINUED | OUTPATIENT
Start: 2024-08-02 | End: 2024-08-05 | Stop reason: HOSPADM

## 2024-08-02 RX ORDER — CARBOPROST TROMETHAMINE 250 UG/ML
250 INJECTION, SOLUTION INTRAMUSCULAR
Status: DISCONTINUED | OUTPATIENT
Start: 2024-08-02 | End: 2024-08-05 | Stop reason: HOSPADM

## 2024-08-02 RX ORDER — CITRIC ACID/SODIUM CITRATE 334-500MG
30 SOLUTION, ORAL ORAL
Status: COMPLETED | OUTPATIENT
Start: 2024-08-02 | End: 2024-08-02

## 2024-08-02 RX ORDER — LOPERAMIDE HCL 2 MG
4 CAPSULE ORAL
Status: DISCONTINUED | OUTPATIENT
Start: 2024-08-02 | End: 2024-08-05 | Stop reason: HOSPADM

## 2024-08-02 RX ORDER — ACETAMINOPHEN 325 MG/1
975 TABLET ORAL EVERY 6 HOURS
Status: DISCONTINUED | OUTPATIENT
Start: 2024-08-02 | End: 2024-08-05 | Stop reason: HOSPADM

## 2024-08-02 RX ORDER — PROCHLORPERAZINE MALEATE 10 MG
10 TABLET ORAL EVERY 6 HOURS PRN
Status: DISCONTINUED | OUTPATIENT
Start: 2024-08-02 | End: 2024-08-05 | Stop reason: HOSPADM

## 2024-08-02 RX ORDER — CARBOPROST TROMETHAMINE 250 UG/ML
250 INJECTION, SOLUTION INTRAMUSCULAR
Status: DISCONTINUED | OUTPATIENT
Start: 2024-08-02 | End: 2024-08-02 | Stop reason: HOSPADM

## 2024-08-02 RX ORDER — IBUPROFEN 800 MG/1
800 TABLET, FILM COATED ORAL EVERY 6 HOURS
Status: DISCONTINUED | OUTPATIENT
Start: 2024-08-03 | End: 2024-08-03

## 2024-08-02 RX ORDER — NALOXONE HYDROCHLORIDE 0.4 MG/ML
0.2 INJECTION, SOLUTION INTRAMUSCULAR; INTRAVENOUS; SUBCUTANEOUS
Status: DISCONTINUED | OUTPATIENT
Start: 2024-08-02 | End: 2024-08-05 | Stop reason: HOSPADM

## 2024-08-02 RX ORDER — NALOXONE HYDROCHLORIDE 0.4 MG/ML
0.4 INJECTION, SOLUTION INTRAMUSCULAR; INTRAVENOUS; SUBCUTANEOUS
Status: DISCONTINUED | OUTPATIENT
Start: 2024-08-02 | End: 2024-08-05 | Stop reason: HOSPADM

## 2024-08-02 RX ORDER — PROCHLORPERAZINE 25 MG
25 SUPPOSITORY, RECTAL RECTAL EVERY 12 HOURS PRN
Status: DISCONTINUED | OUTPATIENT
Start: 2024-08-02 | End: 2024-08-05 | Stop reason: HOSPADM

## 2024-08-02 RX ORDER — TRANEXAMIC ACID 10 MG/ML
1 INJECTION, SOLUTION INTRAVENOUS
Status: COMPLETED | OUTPATIENT
Start: 2024-08-02 | End: 2024-08-02

## 2024-08-02 RX ORDER — SODIUM CHLORIDE, SODIUM LACTATE, POTASSIUM CHLORIDE, CALCIUM CHLORIDE 600; 310; 30; 20 MG/100ML; MG/100ML; MG/100ML; MG/100ML
INJECTION, SOLUTION INTRAVENOUS CONTINUOUS PRN
Status: DISCONTINUED | OUTPATIENT
Start: 2024-08-02 | End: 2024-08-02

## 2024-08-02 RX ORDER — DEXTROSE MONOHYDRATE 100 MG/ML
INJECTION, SOLUTION INTRAVENOUS CONTINUOUS PRN
Status: DISCONTINUED | OUTPATIENT
Start: 2024-08-02 | End: 2024-08-05 | Stop reason: HOSPADM

## 2024-08-02 RX ORDER — OXYTOCIN/0.9 % SODIUM CHLORIDE 30/500 ML
340 PLASTIC BAG, INJECTION (ML) INTRAVENOUS CONTINUOUS PRN
Status: DISCONTINUED | OUTPATIENT
Start: 2024-08-02 | End: 2024-08-02 | Stop reason: HOSPADM

## 2024-08-02 RX ORDER — OXYCODONE HYDROCHLORIDE 5 MG/1
5 TABLET ORAL EVERY 4 HOURS PRN
Status: DISCONTINUED | OUTPATIENT
Start: 2024-08-02 | End: 2024-08-05 | Stop reason: HOSPADM

## 2024-08-02 RX ORDER — ONDANSETRON 4 MG/1
4 TABLET, ORALLY DISINTEGRATING ORAL EVERY 6 HOURS PRN
Status: DISCONTINUED | OUTPATIENT
Start: 2024-08-02 | End: 2024-08-05 | Stop reason: HOSPADM

## 2024-08-02 RX ORDER — LOPERAMIDE HCL 2 MG
2 CAPSULE ORAL
Status: DISCONTINUED | OUTPATIENT
Start: 2024-08-02 | End: 2024-08-02 | Stop reason: HOSPADM

## 2024-08-02 RX ORDER — MORPHINE SULFATE 1 MG/ML
INJECTION, SOLUTION EPIDURAL; INTRATHECAL; INTRAVENOUS
Status: COMPLETED | OUTPATIENT
Start: 2024-08-02 | End: 2024-08-02

## 2024-08-02 RX ORDER — HYDROCORTISONE 25 MG/G
CREAM TOPICAL 3 TIMES DAILY PRN
Status: DISCONTINUED | OUTPATIENT
Start: 2024-08-02 | End: 2024-08-05 | Stop reason: HOSPADM

## 2024-08-02 RX ORDER — CITRIC ACID/SODIUM CITRATE 334-500MG
15 SOLUTION, ORAL ORAL
Status: DISCONTINUED | OUTPATIENT
Start: 2024-08-02 | End: 2024-08-02 | Stop reason: HOSPADM

## 2024-08-02 RX ORDER — KETOROLAC TROMETHAMINE 30 MG/ML
30 INJECTION, SOLUTION INTRAMUSCULAR; INTRAVENOUS EVERY 6 HOURS
Status: COMPLETED | OUTPATIENT
Start: 2024-08-02 | End: 2024-08-03

## 2024-08-02 RX ORDER — DIPHENHYDRAMINE HYDROCHLORIDE 50 MG/ML
25 INJECTION INTRAMUSCULAR; INTRAVENOUS EVERY 6 HOURS PRN
Status: DISCONTINUED | OUTPATIENT
Start: 2024-08-02 | End: 2024-08-05 | Stop reason: HOSPADM

## 2024-08-02 RX ORDER — BUPIVACAINE HYDROCHLORIDE 7.5 MG/ML
INJECTION, SOLUTION INTRASPINAL
Status: COMPLETED | OUTPATIENT
Start: 2024-08-02 | End: 2024-08-02

## 2024-08-02 RX ORDER — MORPHINE SULFATE 0.5 MG/ML
150 INJECTION, SOLUTION EPIDURAL; INTRATHECAL; INTRAVENOUS ONCE
Status: DISCONTINUED | OUTPATIENT
Start: 2024-08-02 | End: 2024-08-02 | Stop reason: HOSPADM

## 2024-08-02 RX ORDER — METHYLERGONOVINE MALEATE 0.2 MG/ML
200 INJECTION INTRAVENOUS
Status: DISCONTINUED | OUTPATIENT
Start: 2024-08-02 | End: 2024-08-05 | Stop reason: HOSPADM

## 2024-08-02 RX ORDER — CEFAZOLIN SODIUM/WATER 3 G/30 ML
3 SYRINGE (ML) INTRAVENOUS SEE ADMIN INSTRUCTIONS
Status: DISCONTINUED | OUTPATIENT
Start: 2024-08-02 | End: 2024-08-02 | Stop reason: HOSPADM

## 2024-08-02 RX ORDER — FENTANYL CITRATE 50 UG/ML
INJECTION, SOLUTION INTRAMUSCULAR; INTRAVENOUS
Status: COMPLETED | OUTPATIENT
Start: 2024-08-02 | End: 2024-08-02

## 2024-08-02 RX ORDER — METOCLOPRAMIDE 10 MG/1
10 TABLET ORAL EVERY 6 HOURS PRN
Status: DISCONTINUED | OUTPATIENT
Start: 2024-08-02 | End: 2024-08-05 | Stop reason: HOSPADM

## 2024-08-02 RX ORDER — MISOPROSTOL 200 UG/1
800 TABLET ORAL
Status: DISCONTINUED | OUTPATIENT
Start: 2024-08-02 | End: 2024-08-05 | Stop reason: HOSPADM

## 2024-08-02 RX ORDER — OXYTOCIN/0.9 % SODIUM CHLORIDE 30/500 ML
100-340 PLASTIC BAG, INJECTION (ML) INTRAVENOUS CONTINUOUS PRN
Status: DISCONTINUED | OUTPATIENT
Start: 2024-08-02 | End: 2024-08-05 | Stop reason: HOSPADM

## 2024-08-02 RX ORDER — METOCLOPRAMIDE HYDROCHLORIDE 5 MG/ML
10 INJECTION INTRAMUSCULAR; INTRAVENOUS EVERY 6 HOURS PRN
Status: DISCONTINUED | OUTPATIENT
Start: 2024-08-02 | End: 2024-08-05 | Stop reason: HOSPADM

## 2024-08-02 RX ORDER — SIMETHICONE 80 MG
80 TABLET,CHEWABLE ORAL 4 TIMES DAILY PRN
Status: DISCONTINUED | OUTPATIENT
Start: 2024-08-02 | End: 2024-08-05 | Stop reason: HOSPADM

## 2024-08-02 RX ORDER — DEXTROSE, SODIUM CHLORIDE, SODIUM LACTATE, POTASSIUM CHLORIDE, AND CALCIUM CHLORIDE 5; .6; .31; .03; .02 G/100ML; G/100ML; G/100ML; G/100ML; G/100ML
INJECTION, SOLUTION INTRAVENOUS CONTINUOUS
Status: DISCONTINUED | OUTPATIENT
Start: 2024-08-02 | End: 2024-08-05 | Stop reason: HOSPADM

## 2024-08-02 RX ORDER — MISOPROSTOL 200 UG/1
400 TABLET ORAL
Status: DISCONTINUED | OUTPATIENT
Start: 2024-08-02 | End: 2024-08-02 | Stop reason: HOSPADM

## 2024-08-02 RX ADMIN — ACETAMINOPHEN 975 MG: 325 TABLET ORAL at 17:41

## 2024-08-02 RX ADMIN — ACETAMINOPHEN 975 MG: 325 TABLET ORAL at 11:33

## 2024-08-02 RX ADMIN — BUPIVACAINE 20 ML: 13.3 INJECTION, SUSPENSION, LIPOSOMAL INFILTRATION at 08:45

## 2024-08-02 RX ADMIN — DIPHENHYDRAMINE HYDROCHLORIDE 25 MG: 50 INJECTION INTRAMUSCULAR; INTRAVENOUS at 10:03

## 2024-08-02 RX ADMIN — SENNOSIDES AND DOCUSATE SODIUM 2 TABLET: 8.6; 5 TABLET ORAL at 20:58

## 2024-08-02 RX ADMIN — Medication 0.15 MG: at 07:40

## 2024-08-02 RX ADMIN — SENNOSIDES AND DOCUSATE SODIUM 1 TABLET: 8.6; 5 TABLET ORAL at 10:03

## 2024-08-02 RX ADMIN — BUPIVACAINE HYDROCHLORIDE 20 ML: 2.5 INJECTION, SOLUTION EPIDURAL; INFILTRATION; INTRACAUDAL at 08:45

## 2024-08-02 RX ADMIN — PHENYLEPHRINE HYDROCHLORIDE 200 MCG: 10 INJECTION INTRAVENOUS at 07:50

## 2024-08-02 RX ADMIN — FENTANYL CITRATE 15 MCG: 50 INJECTION INTRAMUSCULAR; INTRAVENOUS at 07:40

## 2024-08-02 RX ADMIN — TRANEXAMIC ACID 1 G: 10 INJECTION, SOLUTION INTRAVENOUS at 07:26

## 2024-08-02 RX ADMIN — ACETAMINOPHEN 975 MG: 325 TABLET ORAL at 05:40

## 2024-08-02 RX ADMIN — Medication 3 G: at 07:46

## 2024-08-02 RX ADMIN — SODIUM CHLORIDE, POTASSIUM CHLORIDE, SODIUM LACTATE AND CALCIUM CHLORIDE 500 ML: 600; 310; 30; 20 INJECTION, SOLUTION INTRAVENOUS at 06:48

## 2024-08-02 RX ADMIN — SODIUM CITRATE AND CITRIC ACID MONOHYDRATE 30 ML: 500; 334 SOLUTION ORAL at 06:51

## 2024-08-02 RX ADMIN — SODIUM CHLORIDE, POTASSIUM CHLORIDE, SODIUM LACTATE AND CALCIUM CHLORIDE: 600; 310; 30; 20 INJECTION, SOLUTION INTRAVENOUS at 07:31

## 2024-08-02 RX ADMIN — ONDANSETRON 4 MG: 2 INJECTION INTRAMUSCULAR; INTRAVENOUS at 08:16

## 2024-08-02 RX ADMIN — Medication 300 ML/HR: at 08:14

## 2024-08-02 RX ADMIN — KETOROLAC TROMETHAMINE 30 MG: 30 INJECTION, SOLUTION INTRAMUSCULAR at 17:46

## 2024-08-02 RX ADMIN — PHENYLEPHRINE HYDROCHLORIDE 0.5 MCG/KG/MIN: 10 INJECTION INTRAVENOUS at 07:46

## 2024-08-02 RX ADMIN — GLYCOPYRROLATE 0.3 MG: 0.2 INJECTION INTRAMUSCULAR; INTRAVENOUS at 07:50

## 2024-08-02 RX ADMIN — BUPIVACAINE HYDROCHLORIDE IN DEXTROSE 1.6 ML: 7.5 INJECTION, SOLUTION SUBARACHNOID at 07:40

## 2024-08-02 RX ADMIN — ACETAMINOPHEN 975 MG: 325 TABLET ORAL at 23:59

## 2024-08-02 RX ADMIN — ENOXAPARIN SODIUM 40 MG: 40 INJECTION SUBCUTANEOUS at 22:14

## 2024-08-02 ASSESSMENT — ACTIVITIES OF DAILY LIVING (ADL)
ADLS_ACUITY_SCORE: 25
ADLS_ACUITY_SCORE: 20
ADLS_ACUITY_SCORE: 25
ADLS_ACUITY_SCORE: 20
ADLS_ACUITY_SCORE: 25
ADLS_ACUITY_SCORE: 20
ADLS_ACUITY_SCORE: 25
ADLS_ACUITY_SCORE: 20
ADLS_ACUITY_SCORE: 20

## 2024-08-02 NOTE — ANESTHESIA PROCEDURE NOTES
"Intrathecal injection Procedure Note    Pre-Procedure   Staff -        Anesthesiologist:  Fransisco Ortiz MD       Performed By: anesthesiologist       Location: OR       Procedure Start/Stop Times: 8/2/2024 7:40 AM and 8/2/2024 7:43 AM       Pre-Anesthestic Checklist: patient identified, IV checked, risks and benefits discussed, informed consent, monitors and equipment checked, pre-op evaluation and at physician/surgeon's request  Timeout:       Correct Patient: Yes        Correct Procedure: Yes        Correct Site: Yes        Correct Position: Yes   Procedure Documentation  Procedure: intrathecal injection       Patient Position: sitting       Patient Prep/Sterile Barriers: sterile gloves, mask, patient draped       Skin prep: Chloraprep       Insertion Site: L3-4. (midline approach).       Needle Gauge: 24.        Needle Length (Inches): 4        Spinal Needle Type: Pencan       Introducer used       # of attempts: 1 and  # of redirects:  1    Assessment/Narrative         Paresthesias: No.       Sensory Level: T4       CSF fluid: clear.       Opening pressure was cmH2O while  Sitting.      Medication(s) Administered   0.75% Hyperbaric Bupivacaine (Intrathecal) - Intrathecal   1.6 mL - 8/2/2024 7:40:00 AM  Fentanyl PF (Intrathecal) - Intrathecal   15 mcg - 8/2/2024 7:40:00 AM  Morphine PF 1 mg/mL (Intrathecal) - Intrathecal   0.15 mg - 8/2/2024 7:40:00 AM  Medication Administration Time: 8/2/2024 7:40 AM      FOR [facility] ([facility] [place]) ONLY:   Pain Team Contact information: please page the Pain Team Via [employer]. Search \"Pain\". During daytime hours, please page the attending first. At night please page the resident first.      "

## 2024-08-02 NOTE — ANESTHESIA CARE TRANSFER NOTE
Patient: Melissa Albert    Procedure: Procedure(s):  REPEAT  SECTION WITH BILATERAL SALPINGECTOMY       Diagnosis: History of  delivery, antepartum [O34.219]  Request for sterilization [Z30.2]  Type II diabetes mellitus (H) [E11.9]  Diagnosis Additional Information: No value filed.    Anesthesia Type:   Spinal     Note:    Oropharynx: oropharynx clear of all foreign objects and spontaneously breathing  Level of Consciousness: awake  Oxygen Supplementation: room air    Independent Airway: airway patency satisfactory and stable  Dentition: dentition unchanged  Vital Signs Stable: post-procedure vital signs reviewed and stable  Report to RN Given: handoff report given  Patient transferred to: Labor and Delivery    Handoff Report: Identifed the Patient, Identified the Reponsible Provider, Reviewed the pertinent medical history, Discussed the surgical course, Reviewed Intra-OP anesthesia mangement and issues during anesthesia, Set expectations for post-procedure period and Allowed opportunity for questions and acknowledgement of understanding  Vitals:  Vitals Value Taken Time   /58 / 0907   Temp     Pulse     Resp     SpO2     Vitals shown include unfiled device data.    Electronically Signed By: JAKE Robertson CRNA  2024  9:11 AM

## 2024-08-02 NOTE — ANESTHESIA PREPROCEDURE EVALUATION
Anesthesia Pre-Procedure Evaluation    Patient: eMlissa Albert   MRN: [id number] : 1985        Procedure : Procedure(s):  REPEAT  SECTION WITH BILATERAL SALPINGECTOMY          Past Medical History:   Diagnosis Date    Depressive disorder     Diabetes (H)     Joint pain     Uncomplicated asthma       Past Surgical History:   Procedure Laterality Date    CARPAL TUNNEL RELEASE RT/LT Bilateral     both done in      SECTION N/A 2022    Procedure:  SECTION;  Surgeon: Oliva Hollingsworth MD;  [facility]    CHOLECYSTECTOMY      DILATION AND CURETTAGE N/A 2023    Procedure: SUCTION DILATION AND CURETTAGE;  Surgeon: Shirley Lund MD;  Location: [facility] Main OR    LAPAROSCOPY DIAGNOSTIC (GYN)  2018    Procedure: LAPAROSCOPY DIAGNOSTIC (GYN);  Diagnostic Laparoscopy with conversion to laparotomy with removal of incarcerated fibroid;  Surgeon: Makayla Damian DO;  Location: [facility] OR    LAPAROTOMY EXPLORATORY N/A 2018    Procedure: LAPAROTOMY EXPLORATORY;;  Surgeon: Makayla Damian DO;  Location: [facility] OR    LUMBAR PUNCTURE FLUORO GUIDED DIAGNOSTIC  2019    RELEASE TRIGGER FINGER Left 2024    Procedure: RELEASE, LEFT TRIGGER THUMB;  Surgeon: Tarik Peraza MD;  Location: [facility] OR      Allergies   Allergen Reactions    Nitrates, Organic Other (See Comments)     Headache, nausea    Nsaids GI Disturbance      Social History     Tobacco Use    Smoking status: Never     Passive exposure: Never    Smokeless tobacco: Never   Substance Use Topics    Alcohol use: Not Currently      Wt Readings from Last 1 Encounters:   24 122.6 kg (270 lb 3.2 oz)        Anesthesia Evaluation   Pt has had prior anesthetic.         ROS/MED HX  ENT/Pulmonary:  - neg pulmonary ROS   (+)                      asthma                  Neurologic:  - neg neurologic ROS   (+)      migraines,                          Cardiovascular:  - neg cardiovascular ROS      METS/Exercise Tolerance:     Hematologic:     (+)      anemia,          Musculoskeletal:       GI/Hepatic:     (+) GERD, Symptomatic,                  Renal/Genitourinary:  - neg Renal ROS     Endo:     (+)  type II DM,             Obesity,       Psychiatric/Substance Use:     (+) psychiatric history depression       Infectious Disease:       Malignancy:       Other:            Physical Exam    Airway  airway exam normal      Mallampati: II   TM distance: > 3 FB   Neck ROM: full   Mouth opening: > 3 cm    Respiratory Devices and Support         Dental       (+) Completely normal teeth      Cardiovascular   cardiovascular exam normal       Rhythm and rate: regular and normal     Pulmonary   pulmonary exam normal        breath sounds clear to auscultation           OUTSIDE LABS:  CBC:   Lab Results   Component Value Date    WBC 9.7 08/02/2024    WBC 8.6 07/18/2024    HGB 10.0 (L) 08/02/2024    HGB 10.1 (L) 07/18/2024    HCT 31.5 (L) 08/02/2024    HCT 32.6 (L) 07/18/2024     08/02/2024     07/18/2024     BMP:   Lab Results   Component Value Date     07/18/2024     07/09/2024    POTASSIUM 4.7 07/18/2024    POTASSIUM 3.7 07/09/2024    CHLORIDE 105 07/18/2024    CHLORIDE 104 07/09/2024    CO2 25 07/18/2024    CO2 23 07/09/2024    BUN 4.3 (L) 07/18/2024    BUN 4.4 (L) 07/09/2024    CR 0.55 07/18/2024    CR 0.53 07/09/2024    GLC 98 08/02/2024    GLC 80 07/18/2024     COAGS:   Lab Results   Component Value Date    PTT 23 06/12/2024    INR 1.02 06/12/2024    FIBR 426 06/12/2024     POC:   Lab Results   Component Value Date    HCGS Negative 07/04/2018     HEPATIC:   Lab Results   Component Value Date    ALBUMIN 3.5 07/18/2024    PROTTOTAL 6.3 (L) 07/18/2024    ALT 7 07/18/2024    AST 12 07/18/2024    ALKPHOS 102 07/18/2024    BILITOTAL 0.2 07/18/2024     OTHER:   Lab Results   Component Value Date    A1C 5.6 06/10/2024    HECTOR 9.1 07/18/2024    LIPASE 76 07/04/2018       Anesthesia Plan    ASA  Status:  3    NPO Status:  NPO Appropriate    Anesthesia Type: Spinal.              Consents    Anesthesia Plan(s) and associated risks, benefits, and realistic alternatives discussed. Questions answered and patient/representative(s) expressed understanding.     - Discussed: Risks, Benefits and Alternatives for the PROCEDURE were discussed     - Discussed with:  Patient, Spouse      - Extended Intubation/Ventilatory Support Discussed: No.           Postoperative Care    Pain management: IV analgesics, Oral pain medications, intrathecal morphine, Peripheral nerve block (Single Shot), Multi-modal analgesia.     - Plan for long acting post-op opioid use   PONV prophylaxis: Ondansetron (or other 5HT-3)     Comments:               Fransisco Ortiz MD    I have reviewed the pertinent notes and labs in the chart from the past 30 days and (re)examined the patient.  Any updates or changes from those notes are reflected in this note.       # Hypocalcemia: Lowest Ca = 8.5 mg/dL in last 30 days, will monitor and replace as appropriate    # Hypoalbuminemia: Lowest albumin = 3.2 g/dL in the past 30 days , will monitor as appropriate    # Drug Induced Platelet Defect: home medication list includes an antiplatelet medication

## 2024-08-02 NOTE — PROGRESS NOTES
Missed the hat during first void post renae removal at 1745. Will leave hat in toilet to measure two more voids.

## 2024-08-02 NOTE — PROGRESS NOTES
Discussed with mother her plan for feeding.  Reviewed the benefits of exclusive breast milk feeding during the hospital stay.  Informed mother of the risks of using formula to supplement in the first few days of life as well as the benefits of successful breast milk feeding. Mother acknowledges understanding of information reviewed and states that it is her plan to breast milk feed exclusively her infant.  Will support her choice and offer additional information as needed.     Report to Damion SHARMA RN

## 2024-08-02 NOTE — PROGRESS NOTES
"Discussed the nature of NSAID allergy with patient. Patient denied true allergy to NSAIDs, and stated \"it sometimes makes my stomach upset.\" Patient desiring Toradol at this time.   "

## 2024-08-02 NOTE — H&P
[facility]    I reviewed the H&P and there are no significant changes.  Plan for repeat  section and bilateral salpingectomy.  Risks, benefits and alternatives have been discussed with the patient.  Reviewed risks of bleeding, infection, and damage to surrounding organs.  Patient agrees to proceed. Consent signed.      Shirley Lund MD 2024 7:09 AM

## 2024-08-02 NOTE — PROCEDURES
PROCEDURE NOTE    NAME:Melissa Albert  : 1985   MRN: [id number]   GESTATIONAL AGE: 37w0d    ADMISSION DATE: 2024     PCP:  Pablito Jacobo     DATE OF SERVICE: 2024     PREOPERATIVE DIAGNOSIS:   39 year old y.o.  at 37w0d  History of  section  History of myomectomy  Type 2 DM  Chronic abruption     POSTOPERATIVE DIAGNOSIS:  Same    PROCEDURE: Low transverse  section, bilateral salpingectomy       SURGEON: Shirley Lund MD    ASSISTANT: Dr. KELLY Martin     ANESTHESIA: spinal    Delivery QBL (mL): 417    Specimen: Placenta. Bilateral tubes     DRAINS: Jacob catheter.    COMPLICATIONS: None    PROCEDURE NOTE    R/A/B/I of Ceserean delivery were d/w pt including infection, bleeding, blood transfusion, injury to intraabdominal organs , including bowel, bladder and uterus. Injury to baby were d/w patient. Remote chance of hysterectomy. Informed consent granted.      The patient was taken to the operating room where anesthesia was found to be adequate. She was then prepared and draped in the normal sterile fashion in the dorsal supine position with a leftward tilt. A Pfannensteil skin incision was then made with a scalpel and carried through to the underlying layer of fascia with sharp disection. The fascia was incised in the midline and incision was extended laterally with the Gillespie scissor and stretch.  The superior aspect of the fascial incision was then grasped with the Kocher clamps, elevated, and the underlying rectus muscles were dissected off bluntly. This was repeated for inferior aspect of incision. The rectus muscles were then  in the midline, and the peritoneum was identified, and entered bluntly. There was a large amount of scar tissue and the omentum was stuck to the peritoneum overlying the uterus.  The peritoneal incision was then extended superiorly and inferiorly with stretch. An Slim-O retractor was placed. The vesicouterine peritoneum identified, grasped  with a pick-ups, and entered sharply with a Metzenbaum scissors. The incision was extended laterally and the bladder flap created digitally.  The bladder was noted to be well away and the lower uterine segment was incised in a transverse fashion with a scalpel. The uterine incision was then extended laterally in a blunt fashion. The infant's head was attempted to be delivered and could not.  The Slim O was removed and the left rectus muscle was partially transected.  The infant  was then able to be delivered atraumatically. The cord was clamped and cut. The infant was handed off to the awaiting nursing team.  The placenta was then removed manually, the uterus was exteriorized, and cleared of all clots and debris. The uterine incision was repaired with 0-vicryl in a running, locked fashion. A second imbricating layer of 0-Monocryl was used to obtain excellent hemostasis. The right fallopian tube was grasped with Loving clamps and was elevated.  The ligasure was used to completely remove the tube.  The same was done on the left side.  Both tubes were noted to be hemostatic.  Hemostasis assured on rectus.  The fascia was reapproximated with looped 0-PDS in a running fashion. The subcutaneous area was irrigated and hemostasis assured. It was closed with 2-0 Plain.  The skin was closed with 4-0 Monocryl.     The patient did well and went to the recovery room in stable condition.  Sponge and sharp counts were correct.     Shirley Lund MD 8/2/2024 9:01 AM

## 2024-08-02 NOTE — PROGRESS NOTES
Patient's vital signs are within normal limits. Patient dangle/stood without difficulty. Patient to wheel chair to visit infant in NICU. Patient denies pain at this time. Patient plans to pump. Breast pump set up for patient in post partum room. Awaiting first void after renae removal. RN instructed patient to void in hat x2. Dressing is clean, dry, and intact.

## 2024-08-02 NOTE — ANESTHESIA PROCEDURE NOTES
TAP Procedure Note    Pre-Procedure   Staff -        Anesthesiologist:  Fransisco Ortiz MD       Performed By: anesthesiologist       Location: OB       Procedure Start/Stop Times: 8/2/2024 8:45 AM and 8/2/2024 8:50 AM       Pre-Anesthestic Checklist: patient identified, IV checked, site marked, risks and benefits discussed, informed consent, monitors and equipment checked, pre-op evaluation, at physician/surgeon's request and post-op pain management  Timeout:       Correct Patient: Yes        Correct Procedure: Yes        Correct Site: Yes        Correct Position: Yes        Correct Laterality: Yes        Site Marked: Yes  Procedure Documentation  Procedure: TAP       Laterality: bilateral       Patient Position: supine       Patient Prep/Sterile Barriers: sterile gloves, mask       Skin prep: Chloraprep       Insertion Site: T9-10.       Needle Gauge: 20.        Needle Length (Inches): 6        Ultrasound guided       1. Ultrasound was used to identify targeted nerve, plexus, vascular marker, or fascial plane and place a needle adjacent to it in real-time.       2. Ultrasound was used to visualize the spread of anesthetic in close proximity to the above referenced structure.       3. A permanent image is entered into the patient's record.       4. The visualized anatomic structures appeared normal.       5. There were no apparent abnormal pathologic findings.    Assessment/Narrative         The placement was negative for: blood aspirated, painful injection and site bleeding       Paresthesias: No.       Bolus given via needle..        Secured via.        Insertion/Infusion Method: Single Shot       Complications: none    Medication(s) Administered   Bupivacaine 0.25% PF (Infiltration) - Infiltration   20 mL - 8/2/2024 8:45:00 AM  Bupivacaine liposome (Exparel) 1.3% LA inj susp (Infiltration) - Infiltration   20 mL - 8/2/2024 8:45:00 AM  Medication Administration Time: 8/2/2024 8:45 AM      FOR [facility]  "([facility]) ONLY:   Pain Team Contact information: please page the Pain Team Via [employer]. Search \"Pain\". During daytime hours, please page the attending first. At night please page the resident first.      "

## 2024-08-02 NOTE — PLAN OF CARE
Problem: Adult Inpatient Plan of Care  Goal: Plan of Care Review  Description: The Plan of Care Review/Shift note should be completed every shift.  The Outcome Evaluation is a brief statement about your assessment that the patient is improving, declining, or no change.  This information will be displayed automatically on your shift  note.  Outcome: Progressing   Goal Outcome Evaluation:         Data: [facility] presented to [facility]: 2024  5:26 AM.  Patient admitted for scheduled repeat , type II DM. Patient is a .  Prenatal record reviewed. Pregnancy  has been complicated by Type II DM.  Gestational Age 37w0d. VSS. Fetal movement present. Patient denies uterine contractions, leaking of vaginal fluid/rupture of membranes, vaginal bleeding, abdominal pain, pelvic pressure, nausea, vomiting, headache, visual disturbances, epigastric or URQ pain, significant edema. Support person is present.   Action: Verbal consent for EFM.  Admission assessment completed. Bill of rights reviewed.  Response: Patient verbalized agreement with plan.   Aminta Myers RN  2024 6:20 AM    Report given to Yesenia SHARMA RN at 0705. Care of patient relinquished at that time.  Aminta Myers RN  2024 7:28 AM

## 2024-08-03 LAB
GLUCOSE BLDC GLUCOMTR-MCNC: 100 MG/DL (ref 70–99)
GLUCOSE BLDC GLUCOMTR-MCNC: 112 MG/DL (ref 70–99)
GLUCOSE BLDC GLUCOMTR-MCNC: 138 MG/DL (ref 70–99)
GLUCOSE BLDC GLUCOMTR-MCNC: 150 MG/DL (ref 70–99)
GLUCOSE BLDC GLUCOMTR-MCNC: 156 MG/DL (ref 70–99)
HGB BLD-MCNC: 9.9 G/DL (ref 11.7–15.7)

## 2024-08-03 PROCEDURE — 250N000013 HC RX MED GY IP 250 OP 250 PS 637: Performed by: OBSTETRICS & GYNECOLOGY

## 2024-08-03 PROCEDURE — 250N000011 HC RX IP 250 OP 636: Performed by: OBSTETRICS & GYNECOLOGY

## 2024-08-03 PROCEDURE — 36415 COLL VENOUS BLD VENIPUNCTURE: CPT | Performed by: OBSTETRICS & GYNECOLOGY

## 2024-08-03 PROCEDURE — 85018 HEMOGLOBIN: CPT | Performed by: OBSTETRICS & GYNECOLOGY

## 2024-08-03 PROCEDURE — 120N000001 HC R&B MED SURG/OB

## 2024-08-03 PROCEDURE — 250N000013 HC RX MED GY IP 250 OP 250 PS 637: Performed by: ANESTHESIOLOGY

## 2024-08-03 RX ORDER — NICOTINE POLACRILEX 4 MG
15-30 LOZENGE BUCCAL
Status: DISCONTINUED | OUTPATIENT
Start: 2024-08-03 | End: 2024-08-05 | Stop reason: HOSPADM

## 2024-08-03 RX ORDER — CALCIUM GLUCONATE 94 MG/ML
1 INJECTION, SOLUTION INTRAVENOUS
Status: DISCONTINUED | OUTPATIENT
Start: 2024-08-03 | End: 2024-08-05 | Stop reason: HOSPADM

## 2024-08-03 RX ORDER — SODIUM CHLORIDE, SODIUM LACTATE, POTASSIUM CHLORIDE, CALCIUM CHLORIDE 600; 310; 30; 20 MG/100ML; MG/100ML; MG/100ML; MG/100ML
10-125 INJECTION, SOLUTION INTRAVENOUS CONTINUOUS
Status: DISCONTINUED | OUTPATIENT
Start: 2024-08-03 | End: 2024-08-05 | Stop reason: HOSPADM

## 2024-08-03 RX ORDER — LABETALOL HYDROCHLORIDE 5 MG/ML
20-80 INJECTION, SOLUTION INTRAVENOUS EVERY 10 MIN PRN
Status: DISCONTINUED | OUTPATIENT
Start: 2024-08-03 | End: 2024-08-05 | Stop reason: HOSPADM

## 2024-08-03 RX ORDER — HYDROXYZINE HYDROCHLORIDE 25 MG/1
25 TABLET, FILM COATED ORAL EVERY 6 HOURS PRN
Status: DISCONTINUED | OUTPATIENT
Start: 2024-08-03 | End: 2024-08-05 | Stop reason: HOSPADM

## 2024-08-03 RX ORDER — NIFEDIPINE 30 MG
30 TABLET, EXTENDED RELEASE ORAL DAILY
Status: DISCONTINUED | OUTPATIENT
Start: 2024-08-03 | End: 2024-08-05 | Stop reason: HOSPADM

## 2024-08-03 RX ORDER — HYDRALAZINE HYDROCHLORIDE 20 MG/ML
10 INJECTION INTRAMUSCULAR; INTRAVENOUS
Status: DISCONTINUED | OUTPATIENT
Start: 2024-08-03 | End: 2024-08-05 | Stop reason: HOSPADM

## 2024-08-03 RX ORDER — HYDROXYZINE HYDROCHLORIDE 50 MG/1
50 TABLET, FILM COATED ORAL EVERY 6 HOURS PRN
Status: DISCONTINUED | OUTPATIENT
Start: 2024-08-03 | End: 2024-08-05 | Stop reason: HOSPADM

## 2024-08-03 RX ORDER — LIDOCAINE 40 MG/G
CREAM TOPICAL
Status: DISCONTINUED | OUTPATIENT
Start: 2024-08-03 | End: 2024-08-05 | Stop reason: HOSPADM

## 2024-08-03 RX ORDER — MAGNESIUM SULFATE HEPTAHYDRATE 40 MG/ML
2 INJECTION, SOLUTION INTRAVENOUS
Status: DISCONTINUED | OUTPATIENT
Start: 2024-08-03 | End: 2024-08-05 | Stop reason: HOSPADM

## 2024-08-03 RX ORDER — CYCLOBENZAPRINE HCL 10 MG
10 TABLET ORAL EVERY 8 HOURS PRN
Status: DISCONTINUED | OUTPATIENT
Start: 2024-08-03 | End: 2024-08-05 | Stop reason: HOSPADM

## 2024-08-03 RX ORDER — DEXTROSE MONOHYDRATE 25 G/50ML
25-50 INJECTION, SOLUTION INTRAVENOUS
Status: DISCONTINUED | OUTPATIENT
Start: 2024-08-03 | End: 2024-08-05 | Stop reason: HOSPADM

## 2024-08-03 RX ORDER — MAGNESIUM SULFATE 4 G/50ML
4 INJECTION INTRAVENOUS
Status: DISCONTINUED | OUTPATIENT
Start: 2024-08-03 | End: 2024-08-05 | Stop reason: HOSPADM

## 2024-08-03 RX ADMIN — OXYCODONE HYDROCHLORIDE 5 MG: 5 TABLET ORAL at 19:54

## 2024-08-03 RX ADMIN — ACETAMINOPHEN 975 MG: 325 TABLET ORAL at 12:06

## 2024-08-03 RX ADMIN — SENNOSIDES AND DOCUSATE SODIUM 2 TABLET: 8.6; 5 TABLET ORAL at 19:42

## 2024-08-03 RX ADMIN — ACETAMINOPHEN 975 MG: 325 TABLET ORAL at 06:00

## 2024-08-03 RX ADMIN — KETOROLAC TROMETHAMINE 30 MG: 30 INJECTION, SOLUTION INTRAMUSCULAR at 00:04

## 2024-08-03 RX ADMIN — ENOXAPARIN SODIUM 40 MG: 40 INJECTION SUBCUTANEOUS at 22:45

## 2024-08-03 RX ADMIN — CYCLOBENZAPRINE 10 MG: 10 TABLET, FILM COATED ORAL at 21:15

## 2024-08-03 RX ADMIN — NIFEDIPINE 30 MG: 30 TABLET, EXTENDED RELEASE ORAL at 13:51

## 2024-08-03 RX ADMIN — SENNOSIDES AND DOCUSATE SODIUM 1 TABLET: 8.6; 5 TABLET ORAL at 08:00

## 2024-08-03 RX ADMIN — DIPHENHYDRAMINE HYDROCHLORIDE 25 MG: 25 CAPSULE ORAL at 19:54

## 2024-08-03 RX ADMIN — OXYCODONE HYDROCHLORIDE 5 MG: 5 TABLET ORAL at 14:07

## 2024-08-03 RX ADMIN — ENOXAPARIN SODIUM 40 MG: 40 INJECTION SUBCUTANEOUS at 10:45

## 2024-08-03 RX ADMIN — HYDROXYZINE HYDROCHLORIDE 25 MG: 25 TABLET, FILM COATED ORAL at 21:15

## 2024-08-03 RX ADMIN — ACETAMINOPHEN 975 MG: 325 TABLET ORAL at 19:41

## 2024-08-03 ASSESSMENT — ACTIVITIES OF DAILY LIVING (ADL)
ADLS_ACUITY_SCORE: 20

## 2024-08-03 NOTE — LACTATION NOTE
Spoke with Melissa at bedside in NICU.  Melissa was concerned that she has only received drops the last few times she has pumped after getting over 30 ml yesterday evening.   Reviewed milk making process and what to expect over the next few days and weeks.   Melissa verbalized relief with that information.  Dropped off pumping log For Melissa to keep track of pumping and volumes.      PIETER plans on seeing mom on Sunday.

## 2024-08-03 NOTE — ANESTHESIA POSTPROCEDURE EVALUATION
Patient: Melissa Albert    Procedure: Procedure(s):  REPEAT  SECTION WITH BILATERAL SALPINGECTOMY       Anesthesia Type:  Spinal    Note:  Disposition: Inpatient   Postop Pain Control: Uneventful            Sign Out: Well controlled pain   PONV: No   Neuro/Psych: Uneventful            Sign Out: Acceptable/Baseline neuro status   Airway/Respiratory: Uneventful            Sign Out: Acceptable/Baseline resp. status   CV/Hemodynamics: Uneventful            Sign Out: Acceptable CV status; No obvious hypovolemia; No obvious fluid overload   Other NRE: NONE   DID A NON-ROUTINE EVENT OCCUR? No    Event details/Postop Comments:  Neuraxial block has worn off, no residual numbness or weakness. Pain controlled. Denies headache or back pain. No further questions or concerns. Instructed that we are available  should she have questions pertaining to her epidural.             Last vitals:  Vitals Value Taken Time   /86 24 1206   Temp 37.4  C (99.3  F) 24 0800   Pulse 97 24 1206   Resp 18 24 0800   SpO2 95 % 24 0800       Electronically Signed By: Dustin Nix MD  August 3, 2024  3:12 PM

## 2024-08-03 NOTE — LACTATION NOTE
This note was copied from a baby's chart.  Spoke with Melissa briefly bedside in NICU.   Her goal is to breast but did exclusively pump and bottle feed for son due to his medical history.  Melissa reported that she has used the [employer] pump before and is comfortable using it.     PP floor nurse plans on setting up and reviewing pump with mom.       PIETER will follow up with family on Saturday.

## 2024-08-03 NOTE — PROGRESS NOTES
Dr. Hendricks notified of 1 hour post prandial blood sugar of 156. Sliding scale ordered for patient. Per MD, to start this at dinner time. New order for blood sugars to be checked before meals and a bed time placed.

## 2024-08-03 NOTE — PROGRESS NOTES
Obstetrics Post-Op Progress Note         Assessment and Plan:    Assessment: Melissa Albert is a 39 year old  Post-operative day #1 s/p Low transverse repeat  section with bilateral salpingectomy doing okay.     L&D complications: History of  section  History of myomectomy  Type 2 DM  Chronic abruption   History of postpartum pre-eclampsia - elevated blood pressures, will start nifedipine XL  Acute blood loss anemia - asymptomatic          Plan:   Ambulation encouraged  Start iron supplementation for acute blood loss anemia  Nifedipine 30 mg XL ordered.  CBC, CMP in the morning for elevated BP postpartum  T2DM - sliding scale insulin ordered  VTE prophylaxis with Lovenox, SCDs  Anticipate discharge in 2 days           Interval History:   Doing well.  Pain is well-controlled.  No fevers.  Good appetite.   Ambulatory.  Breastfeeding well.          Significant Problems:   History of  section  History of myomectomy  Type 2 DM  Chronic abruption   History of postpartum pre-eclampsia - elevated blood pressures, will start nifedipine XL  Acute blood loss anemia - asymptomatic          Physical Exam:   Temp: 99.3  F (37.4  C) Temp src: Oral BP: (!) 142/86 Pulse: 97   Resp: 18 SpO2: 95 % O2 Device: None (Room air)    Vitals:    24 0531   Weight: 122.6 kg (270 lb 3.2 oz)     Vital Signs with Ranges  Temp:  [98.3  F (36.8  C)-99.3  F (37.4  C)] 99.3  F (37.4  C)  Pulse:  [] 97  Resp:  [18-22] 18  BP: (107-147)/(56-86) 142/86  SpO2:  [94 %-98 %] 95 %  I/O last 3 completed shifts:  In: 1321 [P.O.:618; I.V.:703]  Out: 1381 [Urine:795; Blood:586]    Uterine fundus is firm, non-tender and at the level of the umbilicus  Incision covered by bandage          Data:     Recent Results (from the past 24 hour(s))   Glucose by meter    Collection Time: 24  2:25 PM   Result Value Ref Range    GLUCOSE BY METER POCT 115 (H) 70 - 99 mg/dL   Glucose by meter    Collection Time: 24  7:29 PM    Result Value Ref Range    GLUCOSE BY METER POCT 190 (H) 70 - 99 mg/dL   Hemoglobin    Collection Time: 08/03/24  6:53 AM   Result Value Ref Range    Hemoglobin 9.9 (L) 11.7 - 15.7 g/dL   Glucose by meter    Collection Time: 08/03/24  7:31 AM   Result Value Ref Range    GLUCOSE BY METER POCT 100 (H) 70 - 99 mg/dL   Glucose by meter    Collection Time: 08/03/24  9:27 AM   Result Value Ref Range    GLUCOSE BY METER POCT 138 (H) 70 - 99 mg/dL     Recent Labs   Lab Test 08/02/24  0612 07/29/22  1139 07/05/18  0325   ABO  --   --  A   RH  --   --  Pos   AS Negative   < > Neg    < > = values in this interval not displayed.     Recent Labs   Lab Test 08/03/24  0653 08/02/24  1215   HGB 9.9* 10.6*     Recent Labs   Lab Test 02/12/24  1052   RUQIGG Positive       Carolyn Hendricks MD

## 2024-08-03 NOTE — PROVIDER NOTIFICATION
08/02/24 1938   Nutrition   Intake (%) 100%   Nutrition Interventions   Glycemic Management blood glucose monitored;other (see comments)  (updated physician/ assessed pt)   Provider Notification   Provider Name/Title dr conroy   Method of Notification Phone   Request Evaluate-Remote   Notification Reason Lab Results  (blood glucose result of 190 mg/dL)           Blood glucose obtained one hour after eating her meal, blood glucose was outside of limits set by provider per orders. Called and updated provider of result and that she had no hyperglycemic s/s and had no complaints or concerns. Provider asked to continue monitoring per orders and providers will review results per norm.

## 2024-08-03 NOTE — DISCHARGE INSTRUCTIONS
*You have a Home Care nurse visit planned for Thursday, 8/8/24. The nurse will contact you after discharge to confirm the appointment time. If you do not hear from the nurse by Thursday morning, please call [phone].   (Please do not schedule a clinic appointment on the same day as home nurse visit.)      Warning Signs after Having a Baby    Keep this paper on your fridge or somewhere else where you can see it.    Call your provider if you have any of these symptoms up to 12 weeks after having your baby.    Thoughts of hurting yourself or your baby  Pain in your chest or trouble breathing  Severe headache not helped by pain medicine  Eyesight concerns (blurry vision, seeing spots or flashes of light, other changes to eyesight)  Fainting, shaking or other signs of a seizure    Call 9-1-1 if you feel that it is an emergency.     The symptoms below can happen to anyone after giving birth. They can be very serious. Call your provider if you have any of these warning signs.    My provider s phone number: _______________________    Losing too much blood (hemorrhage)    Call your provider if you soak through a pad in less than an hour or pass blood clots bigger than a golf ball. These may be signs that you are bleeding too much.    Blood clots in the legs or lungs    After you give birth, your body naturally clots its blood to help prevent blood loss. Sometimes this increased clotting can happen in other areas of the body, like the legs or lungs. This can block your blood flow and be very dangerous.     Call your provider if you:  Have a red, swollen spot on the back of your leg that is warm or painful when you touch it.   Are coughing up blood.     Infection    Call your provider if you have any of these symptoms:  Fever of 100.4 F (38 C) or higher.  Pain or redness around your stitches if you had an incision.   Any yellow, white, or green fluid coming from places where you had stitches or surgery.    Mood Problems  (postpartum depression)    Many people feel sad or have mood changes after having a baby. But for some people, these mood swings are worse.     Call your provider right away if you feel so anxious or nervous that you can't care for yourself or your baby.    Preeclampsia (high blood pressure)    Even if you didn't have high blood pressure when you were pregnant, you are at risk for the high blood pressure disease called preeclampsia. This risk can last up to 12 weeks after giving birth.     Call your provider if you have:   Pain on your right side under your rib cage  Sudden swelling in the hands and face    Remember: You know your body. If something doesn't feel right, get medical help.     For informational purposes only. Not to replace the advice of your health care provider. Copyright 2020 [facility]. All rights reserved. Clinically reviewed by Erlinda Seals RNC-OB, MSN. [employer] [id number] - Rev 02/23.

## 2024-08-03 NOTE — PLAN OF CARE
Pain is controlled for Melissa with rest, pillows, medications, and self distraction.  She has set alarms on her phone to remind her to wake up and pump throughout the shift. She has had various amounts with the largest being 35 ml and the last session at 0400 being 0 ml. She is highly encouraged to keep hydrated for the sake of her breast milk supply but also since her voiding trial has just recently finished with some voids less than the 200 ml during the night. Lab will draw a POD1 hemoglobin this am and she is aware of this. She has not passed any clots vaginally since earlier in the evening. Her dressing has new drainage on it but has been marked at 0310 on  08/03/24. IV saline locked and in place and patent. She requests to speak with her provider about ibuprofen use since she has a history of stomach upset after taking this medication orally.         Problem: Breastfeeding  Goal: Effective Breastfeeding  Intervention: Promote Breast Care and Comfort  Flowsheets  Taken 8/3/2024 0606  Breast Care: Breastfeeding: supportive bra utilized  Taken 8/3/2024 0008  Breast Care:   double electric breast pump utilized   bilateral breasts pumped until soft  Taken 8/2/2024 2013  Breast Care:   bilateral breasts pumped until soft   double electric breast pump utilized

## 2024-08-03 NOTE — PROGRESS NOTES
Patient has had elevated blood pressures. MD notified and started on Nifedipine 30mg ER daily. Patient denies headache or vision changes. Patient ambulates independently and frequents NICU for infant cares and feeds. Patient is pumping. Reports pain is tolerable with scheduled Tylenol and PRN oxycodone. Old dressing was replaced per MD order with a Mepilex at 1400 for increased dressing saturation. Incision appeared clean and approximated.

## 2024-08-04 LAB
ALBUMIN SERPL BCG-MCNC: 2.9 G/DL (ref 3.5–5.2)
ALP SERPL-CCNC: 80 U/L (ref 40–150)
ALT SERPL W P-5'-P-CCNC: 7 U/L (ref 0–50)
ANION GAP SERPL CALCULATED.3IONS-SCNC: 8 MMOL/L (ref 7–15)
AST SERPL W P-5'-P-CCNC: 13 U/L (ref 0–45)
BILIRUB SERPL-MCNC: <0.2 MG/DL
BUN SERPL-MCNC: 5.8 MG/DL (ref 6–20)
CALCIUM SERPL-MCNC: 8.2 MG/DL (ref 8.8–10.4)
CHLORIDE SERPL-SCNC: 107 MMOL/L (ref 98–107)
CREAT SERPL-MCNC: 0.53 MG/DL (ref 0.51–0.95)
EGFRCR SERPLBLD CKD-EPI 2021: >90 ML/MIN/1.73M2
ERYTHROCYTE [DISTWIDTH] IN BLOOD BY AUTOMATED COUNT: 19.4 % (ref 10–15)
GLUCOSE BLDC GLUCOMTR-MCNC: 104 MG/DL (ref 70–99)
GLUCOSE BLDC GLUCOMTR-MCNC: 148 MG/DL (ref 70–99)
GLUCOSE BLDC GLUCOMTR-MCNC: 169 MG/DL (ref 70–99)
GLUCOSE BLDC GLUCOMTR-MCNC: 77 MG/DL (ref 70–99)
GLUCOSE SERPL-MCNC: 79 MG/DL (ref 70–99)
HCO3 SERPL-SCNC: 24 MMOL/L (ref 22–29)
HCT VFR BLD AUTO: 30.8 % (ref 35–47)
HGB BLD-MCNC: 9.9 G/DL (ref 11.7–15.7)
MCH RBC QN AUTO: 28.8 PG (ref 26.5–33)
MCHC RBC AUTO-ENTMCNC: 32.1 G/DL (ref 31.5–36.5)
MCV RBC AUTO: 90 FL (ref 78–100)
PLATELET # BLD AUTO: 278 10E3/UL (ref 150–450)
POTASSIUM SERPL-SCNC: 4 MMOL/L (ref 3.4–5.3)
PROT SERPL-MCNC: 5.3 G/DL (ref 6.4–8.3)
RBC # BLD AUTO: 3.44 10E6/UL (ref 3.8–5.2)
SODIUM SERPL-SCNC: 139 MMOL/L (ref 135–145)
WBC # BLD AUTO: 8.9 10E3/UL (ref 4–11)

## 2024-08-04 PROCEDURE — 120N000001 HC R&B MED SURG/OB

## 2024-08-04 PROCEDURE — 250N000011 HC RX IP 250 OP 636: Performed by: OBSTETRICS & GYNECOLOGY

## 2024-08-04 PROCEDURE — 250N000013 HC RX MED GY IP 250 OP 250 PS 637: Performed by: OBSTETRICS & GYNECOLOGY

## 2024-08-04 PROCEDURE — 36415 COLL VENOUS BLD VENIPUNCTURE: CPT | Performed by: OBSTETRICS & GYNECOLOGY

## 2024-08-04 PROCEDURE — 250N000012 HC RX MED GY IP 250 OP 636 PS 637: Performed by: OBSTETRICS & GYNECOLOGY

## 2024-08-04 PROCEDURE — 85027 COMPLETE CBC AUTOMATED: CPT | Performed by: OBSTETRICS & GYNECOLOGY

## 2024-08-04 PROCEDURE — 84075 ASSAY ALKALINE PHOSPHATASE: CPT | Performed by: OBSTETRICS & GYNECOLOGY

## 2024-08-04 RX ORDER — OXYCODONE HYDROCHLORIDE 5 MG/1
5 TABLET ORAL EVERY 4 HOURS PRN
Qty: 12 TABLET | Refills: 0 | Status: SHIPPED | OUTPATIENT
Start: 2024-08-04 | End: 2024-08-05

## 2024-08-04 RX ORDER — NIFEDIPINE 30 MG
30 TABLET, EXTENDED RELEASE ORAL DAILY
Qty: 30 TABLET | Refills: 1 | Status: SHIPPED | OUTPATIENT
Start: 2024-08-05 | End: 2024-10-02

## 2024-08-04 RX ORDER — LABETALOL 200 MG/1
200 TABLET, FILM COATED ORAL EVERY 12 HOURS SCHEDULED
Status: DISCONTINUED | OUTPATIENT
Start: 2024-08-04 | End: 2024-08-05 | Stop reason: HOSPADM

## 2024-08-04 RX ADMIN — HYDROXYZINE HYDROCHLORIDE 25 MG: 25 TABLET, FILM COATED ORAL at 12:59

## 2024-08-04 RX ADMIN — LABETALOL HYDROCHLORIDE 200 MG: 200 TABLET ORAL at 21:01

## 2024-08-04 RX ADMIN — ACETAMINOPHEN 975 MG: 325 TABLET ORAL at 21:01

## 2024-08-04 RX ADMIN — INSULIN ASPART 1 UNITS: 100 INJECTION, SOLUTION INTRAVENOUS; SUBCUTANEOUS at 19:56

## 2024-08-04 RX ADMIN — NIFEDIPINE 30 MG: 30 TABLET, EXTENDED RELEASE ORAL at 08:15

## 2024-08-04 RX ADMIN — ENOXAPARIN SODIUM 40 MG: 40 INJECTION SUBCUTANEOUS at 22:09

## 2024-08-04 RX ADMIN — CYCLOBENZAPRINE 10 MG: 10 TABLET, FILM COATED ORAL at 08:15

## 2024-08-04 RX ADMIN — ENOXAPARIN SODIUM 40 MG: 40 INJECTION SUBCUTANEOUS at 09:14

## 2024-08-04 RX ADMIN — OXYCODONE HYDROCHLORIDE 5 MG: 5 TABLET ORAL at 22:36

## 2024-08-04 RX ADMIN — HYDROXYZINE HYDROCHLORIDE 25 MG: 25 TABLET, FILM COATED ORAL at 22:36

## 2024-08-04 RX ADMIN — SENNOSIDES AND DOCUSATE SODIUM 1 TABLET: 8.6; 5 TABLET ORAL at 08:15

## 2024-08-04 RX ADMIN — LABETALOL HYDROCHLORIDE 200 MG: 200 TABLET ORAL at 11:52

## 2024-08-04 RX ADMIN — ACETAMINOPHEN 975 MG: 325 TABLET ORAL at 14:41

## 2024-08-04 RX ADMIN — CYCLOBENZAPRINE 10 MG: 10 TABLET, FILM COATED ORAL at 19:55

## 2024-08-04 RX ADMIN — ACETAMINOPHEN 975 MG: 325 TABLET ORAL at 02:34

## 2024-08-04 RX ADMIN — OXYCODONE HYDROCHLORIDE 5 MG: 5 TABLET ORAL at 12:59

## 2024-08-04 RX ADMIN — SENNOSIDES AND DOCUSATE SODIUM 2 TABLET: 8.6; 5 TABLET ORAL at 21:01

## 2024-08-04 RX ADMIN — ACETAMINOPHEN 975 MG: 325 TABLET ORAL at 08:15

## 2024-08-04 ASSESSMENT — ACTIVITIES OF DAILY LIVING (ADL)
ADLS_ACUITY_SCORE: 20

## 2024-08-04 NOTE — PLAN OF CARE
Goal Outcome Evaluation:      Plan of Care Reviewed With: patient    Overall Patient Progress: improvingOverall Patient Progress: improving    Outcome Evaluation: htn assessments stable- patient denies all s/s of htn. see vital signs floewsheet for data. hat was removed from toilet with bathroom clean up and was not replaced- output is not accurate for this shift. dialy weight completed.    She is pumping now and plans to have breakfast after she gets ready for the day, so she would not like her pre breakfast POCT completed now please.

## 2024-08-04 NOTE — PLAN OF CARE
Goal Outcome Evaluation:      Plan of Care Reviewed With: patient    Overall Patient Progress: improving    Patient's vital signs are within normal limits. Started on Labetalol 200mg BID today. Patient ambulates independently and frequents NICU for infant cares and feeds. Patient is frequently pumping. Reports pain is tolerable with scheduled Tylenol and PRN Oxycodone, flexeril, and hydroxyzine. Mepilex is clean, dry, and intact.

## 2024-08-04 NOTE — LACTATION NOTE
Meet Melissa bedside in NICU,  Melissa reported that she is feeling relief that her supply has been increasing and she is pumping between 10-12 ml now.    She verbalized understand of when to more to the maintain program on the symphony.    We touched on renting the [employer] breast pump depending on infants discharge date.     PIETER plans on follow up with mom on Monday 8/5.

## 2024-08-04 NOTE — DISCHARGE SUMMARY
HOSPITAL DISCHARGE SUMMARY -  Birth    Patient Name: Melissa Albert   YOB: 1985  Age: 39 year old  Medical Record Number: [id number]  Primary Physician: Pablito Jacobo    Admission Date:  2024  Delivery Date:   2024  Gestational Age at Delivery:  37w0d   Discharge Date:  2024    REASON FOR ADMISSION: Labor and Delivery    DIAGNOSIS:    1.  Birth secondary to repeat  2. APGARS at 1 min 8, at 5 min 9  3. Baby's Weight 8 lbs 8.9oz  4. Bilateral salpingectomy  5. Acute blood loss anemia - not clinically significant  6. Type 2 DM    Conditions complicating antepartum/postpartum:      PROCEDURES:  Lower transverse     SIGNIFICANT DIAGNOSTIC PROCEDURES:   none    CONSULTS: none    HISTORY OF PRESENT ILLNESS AND HOSPITAL COURSE: This is a 39 year old   female who underwent  section without complication secondary to repeat . Postoperative course was unremarkable. On the day of discharge patient was tolerating diet, pain was controlled with oral medications, she was voiding and passing gas.    LABS:  Lab Results   Component Value Date    HGB 9.9 (L) 2024     Pre-operative hgb : 10.6      PENDING LABS:  none    DISPOSITION:  Home    DISCHARGE CONDITION: Good/Stable    DISCHARGE MEDICATIONS:      Review of your medicines        UNREVIEWED medicines. Ask your doctor about these medicines        Dose / Directions   aspirin 81 MG EC tablet      Dose: 81 mg  Take 81 mg by mouth daily  Refills: 0     ferrous sulfate 325 (65 Fe) MG tablet  Commonly known as: FEROSUL  Indication: Anemia From Inadequate Iron in the Body      Dose: 325 mg  Take 325 mg by mouth 2 times daily  Refills: 0     hydrOXYzine 50 MG capsule  Commonly known as: VISTARIL  Used for: Vaginal bleeding in pregnancy      Dose: 50 mg  Take 1 capsule (50 mg) by mouth 3 times daily as needed for itching  Quantity: 30 capsule  Refills: 0     Insulin Aspart FlexPen 100 UNIT/ML Sopn      1 unit per 5g  of carb and 1 unit for every 5 over 120. Max daily dose of 75 units.  Refills: 0     insulin glargine 100 UNIT/ML pen  Commonly known as: LANTUS PEN  Indication: Type 2 Diabetes  Used for: Type 2 diabetes mellitus complicating pregnancy in third trimester, antepartum      Dose: 120 Units  Inject 120 Units Subcutaneous at bedtime Every three days if fasting BG is not below 95, increase by 10units (per patient)  Quantity: 15 mL  Refills: 1     * insulin lispro 100 UNIT/ML (1 unit dial) KWIKPEN  Commonly known as: HumaLOG KWIKPEN  Indication: Type 2 Diabetes  Ask about: Which instructions should I use?      Dose: 1 units/carb unit  Inject 1 units/carb unit Subcutaneous 4 times daily (with meals and nightly) 1 unit of insulin per every 5 grams of CHO  Refills: 0     * insulin lispro 100 UNIT/ML (1 unit dial) KWIKPEN  Commonly known as: HumaLOG KWIKPEN  Used for: Type 2 diabetes mellitus without complication, without long-term current use of insulin (H)  Ask about: Which instructions should I use?      Dose: 1 Units  Inject 1 Units Subcutaneous 4 times daily (with meals and nightly)  Quantity: 15 mL  Refills: 0     metoclopramide 10 MG tablet  Commonly known as: REGLAN      Dose: 10 mg  Take 10 mg by mouth 4 times daily as needed  Refills: 0     ondansetron 4 MG ODT tab  Commonly known as: ZOFRAN ODT      dissolve 1 tablet on the tongue every 8 hours as needed for nausea  Refills: 0     PRENATAL 1 PO      Refills: 0     promethazine 25 MG tablet  Commonly known as: PHENERGAN      Dose: 25 mg  Take 25 mg by mouth every 6 hours as needed for nausea  Refills: 0     sennosides 8.6 MG tablet  Commonly known as: SENOKOT      Dose: 2 tablet  Take 2 tablets by mouth daily  Refills: 0           * This list has 2 medication(s) that are the same as other medications prescribed for you. Read the directions carefully, and ask your doctor or other care provider to review them with you.                CONTINUE these medicines which  have NOT CHANGED        Dose / Directions   Dexcom G6 Transmitter Misc      Change every 3 months.  Refills: 0              DISCHARGE PLAN:   - Follow up with  Dr. Shirley Lund, in 2wks  - Take medication as prescribed  - Physical activity: As tolerated, no heavy lifting. Pelvic rest.  - Diet:  Regular  - Medication:  Please see MAR  - Warning signs discussed with patient about when to call the clinic/hospital  - All questions and concerns were answered for the patient prior to discharge.         Makayla Beard,  on 8/4/2024 at 9:43 AM      I saw the patient on the date of discharge  Total time spent for discharge on date of discharge: 20 minutes    Physician(s) in addition to primary physician who should receive a copy:  CC1: Dr. ALLYSON Lund

## 2024-08-04 NOTE — PROGRESS NOTES
"Postpartum Day 2:     Subjective:  The patient feels well. The patient has no emotional concerns. Pain is well controlled with current medications. The patient is ambulating well. She is voiding and passing gas.The amount and color of the lochia is appropriate for the duration of recovery, patient denies clots. The baby is well.    Objective   The patient has a blood pressure which is within the normal range.Urinary output is adequate.       Exam:   Temp:  [97.5  F (36.4  C)-98.9  F (37.2  C)] 97.9  F (36.6  C)  Pulse:  [] 96  Resp:  [18-22] 20  BP: (136-149)/(72-86) 145/85  SpO2:  [96 %-97 %] 96 %      BP (!) 145/85 (BP Location: Left arm)   Pulse 96   Temp 97.9  F (36.6  C) (Oral)   Resp 20   Ht 1.6 m (5' 3\")   Wt 118.8 kg (261 lb 12.8 oz)   LMP 2023 (Exact Date)   SpO2 96%   Breastfeeding Unknown   BMI 46.38 kg/m  .  General: NAD  Abdomen: soft, NT   Incision: C/D/I  Fundus: firm, nontender  Ext: no pain       Impression/Plan:   Normal postpartum course. POD#2 LTCS secondary to history of   Bilateral salpingectomy  Postpartum hypertension - on Nifedipine; will add Labetalol today.        Makayla Beard, DO on 2024 at 8:35 AM    "

## 2024-08-04 NOTE — PROVIDER NOTIFICATION
08/03/24 2100   Provider Notification   Provider Name/Title dr dalal   Method of Notification Phone   Request Evaluate-Remote   Notification Reason Medication Request;Status Update  (itching and increased pain)     Since start of 1900 shift she has had continued pain despite prn oxydocone and scheduled tylenol.     She has also had continued itching that she is aware may be from the oxycodone but hse says pain control is worth the itching if it helps. She took prn benadryl that was not effective and thus this was discussed with provider as well.  Provider also updated on adhesive rashes.     She placed new orders for pain management and for itching. She kept prn oxycodone and benadryl in place.

## 2024-08-05 VITALS
OXYGEN SATURATION: 98 % | WEIGHT: 268.08 LBS | HEART RATE: 97 BPM | HEIGHT: 63 IN | SYSTOLIC BLOOD PRESSURE: 134 MMHG | DIASTOLIC BLOOD PRESSURE: 75 MMHG | RESPIRATION RATE: 17 BRPM | TEMPERATURE: 98.5 F | BODY MASS INDEX: 47.5 KG/M2

## 2024-08-05 PROBLEM — O13.9 GESTATIONAL HYPERTENSION: Status: ACTIVE | Noted: 2024-08-05

## 2024-08-05 LAB
GLUCOSE BLDC GLUCOMTR-MCNC: 107 MG/DL (ref 70–99)
GLUCOSE BLDC GLUCOMTR-MCNC: 109 MG/DL (ref 70–99)
PLATELET # BLD AUTO: 245 10E3/UL (ref 150–450)

## 2024-08-05 PROCEDURE — 85049 AUTOMATED PLATELET COUNT: CPT | Performed by: OBSTETRICS & GYNECOLOGY

## 2024-08-05 PROCEDURE — 250N000013 HC RX MED GY IP 250 OP 250 PS 637: Performed by: OBSTETRICS & GYNECOLOGY

## 2024-08-05 PROCEDURE — 36415 COLL VENOUS BLD VENIPUNCTURE: CPT | Performed by: OBSTETRICS & GYNECOLOGY

## 2024-08-05 PROCEDURE — 250N000011 HC RX IP 250 OP 636: Performed by: OBSTETRICS & GYNECOLOGY

## 2024-08-05 RX ORDER — OXYCODONE HYDROCHLORIDE 5 MG/1
5 TABLET ORAL EVERY 4 HOURS PRN
Status: SHIPPED
Start: 2024-08-05 | End: 2024-10-02

## 2024-08-05 RX ORDER — HYDROXYZINE HYDROCHLORIDE 25 MG/1
25 TABLET, FILM COATED ORAL EVERY 6 HOURS PRN
Qty: 30 TABLET | Refills: 0 | Status: SHIPPED | OUTPATIENT
Start: 2024-08-05 | End: 2024-10-02

## 2024-08-05 RX ORDER — CYCLOBENZAPRINE HCL 10 MG
10 TABLET ORAL EVERY 8 HOURS PRN
Qty: 30 TABLET | Refills: 0 | Status: SHIPPED | OUTPATIENT
Start: 2024-08-05 | End: 2024-10-02

## 2024-08-05 RX ORDER — LABETALOL 200 MG/1
200 TABLET, FILM COATED ORAL EVERY 12 HOURS
Qty: 60 TABLET | Refills: 1 | Status: SHIPPED | OUTPATIENT
Start: 2024-08-05 | End: 2024-10-02

## 2024-08-05 RX ORDER — ACETAMINOPHEN 325 MG/1
975 TABLET ORAL EVERY 6 HOURS PRN
COMMUNITY
Start: 2024-08-05

## 2024-08-05 RX ADMIN — LABETALOL HYDROCHLORIDE 200 MG: 200 TABLET ORAL at 09:10

## 2024-08-05 RX ADMIN — ACETAMINOPHEN 975 MG: 325 TABLET ORAL at 09:10

## 2024-08-05 RX ADMIN — ENOXAPARIN SODIUM 40 MG: 40 INJECTION SUBCUTANEOUS at 11:33

## 2024-08-05 RX ADMIN — SENNOSIDES AND DOCUSATE SODIUM 1 TABLET: 8.6; 5 TABLET ORAL at 09:10

## 2024-08-05 RX ADMIN — ACETAMINOPHEN 975 MG: 325 TABLET ORAL at 16:33

## 2024-08-05 RX ADMIN — CYCLOBENZAPRINE 10 MG: 10 TABLET, FILM COATED ORAL at 08:18

## 2024-08-05 RX ADMIN — ACETAMINOPHEN 975 MG: 325 TABLET ORAL at 03:27

## 2024-08-05 RX ADMIN — NIFEDIPINE 30 MG: 30 TABLET, EXTENDED RELEASE ORAL at 09:10

## 2024-08-05 ASSESSMENT — ACTIVITIES OF DAILY LIVING (ADL)
ADLS_ACUITY_SCORE: 20

## 2024-08-05 NOTE — PLAN OF CARE
Goal Outcome Evaluation:  Adequate for transition.  Goals Met.    D:  Patient desires discharge home.  Discharge orders received and entered by provider.  A:  Discharge instructions reviewed with the patient.  All questions and concerns addressed.  R:  Discharge criteria met.   Baby is still in NICU .  The nursing assistant escorted patient to car .

## 2024-08-05 NOTE — PROVIDER NOTIFICATION
Notified Dr. Sultana that the pt had a blood pressure of 152/72.     Per Dr. Sultana: Pt may be able to go home later as long as blood pressures are not in severe range.

## 2024-08-05 NOTE — DISCHARGE SUMMARY
Discharge Summary    Melissa Albert MRN# [id number]   Age: 39 year old YOB: 1985     Date of Admission:  2024  Date of Discharge::  2024  Admitting Physician:  Shirley Lund MD  Discharge Physician:  Stefanie Sultana MD     Home clinic: [facility], [place]          Admission Diagnoses:   Chronic abruption, type 2 DM  Intrauterine pregnancy at 37w0d          Discharge Diagnosis:   Same   Postpartum hypertension         Procedures:     Procedure(s): Low transverse repeat  delivery via Pfannenstiel incision  Bilateral salpingectomy              Medications Prior to Admission:     Medications Prior to Admission   Medication Sig Dispense Refill Last Dose    ferrous sulfate (FEROSUL) 325 (65 Fe) MG tablet Take 325 mg by mouth 2 times daily   2024 at 1900    hydrOXYzine (VISTARIL) 50 MG capsule Take 1 capsule (50 mg) by mouth 3 times daily as needed for itching 30 capsule 0 2024 at 2030    metoclopramide (REGLAN) 10 MG tablet Take 10 mg by mouth 4 times daily as needed   More than a month    ondansetron (ZOFRAN ODT) 4 MG ODT tab dissolve 1 tablet on the tongue every 8 hours as needed for nausea   More than a month    Prenatal MV-Min-Fe Fum-FA-DHA (PRENATAL 1 PO)    2024 at 2000    promethazine (PHENERGAN) 25 MG tablet Take 25 mg by mouth every 6 hours as needed for nausea   More than a month    sennosides (SENOKOT) 8.6 MG tablet Take 2 tablets by mouth daily   2024 at 2000    Continuous Blood Gluc Transmit (DEXCOM G6 TRANSMITTER) MISC Change every 3 months.       [DISCONTINUED] aspirin 81 MG EC tablet Take 81 mg by mouth daily   Past Week    [DISCONTINUED] Insulin Aspart FlexPen 100 UNIT/ML SOPN 1 unit per 5g of carb and 1 unit for every 5 over 120. Max daily dose of 75 units.       [DISCONTINUED] insulin glargine (LANTUS PEN) 100 UNIT/ML pen Inject 120 Units Subcutaneous at bedtime Every three days if fasting BG is not below 95, increase by 10units (per patient) 15 mL  1 8/1/2024 at 2000    [DISCONTINUED] insulin lispro (HUMALOG KWIKPEN) 100 UNIT/ML (1 unit dial) KWIKPEN Inject 1 Units Subcutaneous 4 times daily (with meals and nightly) 15 mL 0     [DISCONTINUED] insulin lispro (HUMALOG KWIKPEN) 100 UNIT/ML (1 unit dial) KWIKPEN Inject 1 units/carb unit Subcutaneous 4 times daily (with meals and nightly) 1 unit of insulin per every 5 grams of CHO   Past Week             Discharge Medications:        Review of your medicines        START taking        Dose / Directions   acetaminophen 325 MG tablet  Commonly known as: TYLENOL      Dose: 975 mg  Take 3 tablets (975 mg) by mouth every 6 hours as needed for mild pain  Refills: 0     cyclobenzaprine 10 MG tablet  Commonly known as: FLEXERIL      Dose: 10 mg  Take 1 tablet (10 mg) by mouth every 8 hours as needed for muscle spasms  Quantity: 30 tablet  Refills: 0     hydrOXYzine HCl 25 MG tablet  Commonly known as: ATARAX      Dose: 25 mg  Take 1 tablet (25 mg) by mouth every 6 hours as needed for other or itching (adjuvant pain)  Quantity: 30 tablet  Refills: 0     labetalol 200 MG tablet  Commonly known as: NORMODYNE      Dose: 200 mg  Take 1 tablet (200 mg) by mouth every 12 hours  Quantity: 60 tablet  Refills: 1     NIFEdipine ER 30 MG 24 hr tablet  Commonly known as: ADALAT CC      Dose: 30 mg  Take 1 tablet (30 mg) by mouth daily  Quantity: 30 tablet  Refills: 1     oxyCODONE 5 MG tablet  Commonly known as: ROXICODONE      Dose: 5 mg  Take 1 tablet (5 mg) by mouth every 4 hours as needed for moderate to severe pain  Refills: 0            CONTINUE these medicines which have NOT CHANGED        Dose / Directions   Dexcom G6 Transmitter Misc      Change every 3 months.  Refills: 0     ferrous sulfate 325 (65 Fe) MG tablet  Commonly known as: FEROSUL  Indication: Anemia From Inadequate Iron in the Body      Dose: 325 mg  Take 325 mg by mouth 2 times daily  Refills: 0     hydrOXYzine 50 MG capsule  Commonly known as: VISTARIL  Used for:  Vaginal bleeding in pregnancy      Dose: 50 mg  Take 1 capsule (50 mg) by mouth 3 times daily as needed for itching  Quantity: 30 capsule  Refills: 0     metoclopramide 10 MG tablet  Commonly known as: REGLAN      Dose: 10 mg  Take 10 mg by mouth 4 times daily as needed  Refills: 0     ondansetron 4 MG ODT tab  Commonly known as: ZOFRAN ODT      dissolve 1 tablet on the tongue every 8 hours as needed for nausea  Refills: 0     PRENATAL 1 PO      Refills: 0     promethazine 25 MG tablet  Commonly known as: PHENERGAN      Dose: 25 mg  Take 25 mg by mouth every 6 hours as needed for nausea  Refills: 0     sennosides 8.6 MG tablet  Commonly known as: SENOKOT      Dose: 2 tablet  Take 2 tablets by mouth daily  Refills: 0            STOP taking      aspirin 81 MG EC tablet        Insulin Aspart FlexPen 100 UNIT/ML Sopn        insulin glargine 100 UNIT/ML pen  Commonly known as: LANTUS PEN        insulin lispro 100 UNIT/ML (1 unit dial) KWIKPEN  Commonly known as: HumaLOG KWIKPEN                  Where to get your medicines        These medications were sent to [id number] - [place] [id number] AT [facility] [address]  [address], [facility] [address]      Phone: [phone]   cyclobenzaprine 10 MG tablet  hydrOXYzine HCl 25 MG tablet  labetalol 200 MG tablet  NIFEdipine ER 30 MG 24 hr tablet       Some of these will need a paper prescription and others can be bought over the counter. Ask your nurse if you have questions.    You don't need a prescription for these medications  acetaminophen 325 MG tablet              Hospital Course:   The patient's hospital course was remarkable for elevated blood pressures, started on antihypertensives.  She recovered as anticipated from surgery and was able to ambulate to NICU.  On discharge, her pain was well controlled.  Vaginal bleeding is similar to peak menstrual flow.  Voiding without difficulty.  Ambulating well and tolerating  "a normal diet.  No fever or significant wound drainage.  Infant in nICU.  Educated on bp surveillance and follow up. She was discharged on post-partum day #3.  Per endocrine, continue to follow blood sugar but no insulin needed.     Post-partum hemoglobin:   Hemoglobin   Date Value Ref Range Status   08/04/2024 9.9 (L) 11.7 - 15.7 g/dL Final   07/08/2018 8.1 (L) 11.7 - 15.7 g/dL Final       Day of discharge assessment:   BP (!) 152/72 (BP Location: Left arm, Patient Position: Semi-Talamantes's, Cuff Size: Adult Large)   Pulse 95   Temp 98  F (36.7  C) (Oral)   Resp 18   Ht 1.6 m (5' 3\")   Wt 121.6 kg (268 lb 1.3 oz)   LMP 09/16/2023 (Exact Date)   SpO2 96%   Breastfeeding Unknown   BMI 47.49 kg/m    Abdomen: Soft, fundus firm, incision covered          Discharge Instructions and Follow-Up:     Discharge diet: Regular   Discharge activity: Your activity upon discharge: no lifting >15 lbs or strenuous exercise for 6 weeks, no driving for 2 weeks or until you can slam on the breaks w/o pain, nothing in the vagina for 6 weeks (no tampons, intercourse, douching).    Discharge follow-up: Two weeks for incision check, home health this week for BP check and endocrine in 2 weeks  6 weeks for postpartum visit.   Wound care: Remove bandage 7 days after surgery  Keep incision clean and dry.           Discharge Disposition:     Discharged to home      Attestation:  I have reviewed today's vital signs, notes, medications, labs and imaging.  Amount of time performed on this discharge summary: 20 minutes.  Total time: 30 minutes    Stefanie Sultana MD         "

## 2024-08-05 NOTE — PLAN OF CARE
Goal Outcome Evaluation:      Plan of Care Reviewed With: patient    Overall Patient Progress: improvingOverall Patient Progress: improving    Outcome Evaluation: hypertension is managed well with medications and pain control and rest. per patient her pain was more tolerable for patient tonight than it was the night prior. She has taken less PRN pain medications and is in happy spirits. she has been pumping every 2 hours and has had an increase in her milk supply pumping approx 50 ml per pumping session. she has enough bottles and labels for the rest of the night. she has been doing wel lstaying hydrated and her blood glucose only reqiured insulin intervention once.

## 2024-08-05 NOTE — LACTATION NOTE
NICU Follow up:    Gestational Age at Delivery: 37w0d     Corrected Gestational Age: 37w3d    Current Age: 3 day 3 hours    Method of Feedings: gavage- infant was jsut removed from CPAP     Breastfeeding goals:12+months      Hand Hugs/STS/Nuzzling/Latching: hand hugs, STS. Was latchign before starting CPAP    Breastfeeding: would like to start trying as soon as infant is medically able     Pumping Volume p/24hours: Supply continues to increase, pumping 50 ml at last pump and moved to maintain program.       Adjustments to Plan:  No adjustment made to plan at this time.     Melissa has a unimom and a spectra at home, she declined renting the symphony at this time, is open to the rental if needed- plans on using the symphony at NICU bedside.      Education:  [] First drops kit  [] Benefits of breast milk  [] How breast milk is made  [] Stages of milk production  [] Milk supply/goal volumes  [] Hand expression  [x] Collecting, labeling, transporting milk  [x] Cleaning, sanitizing pump parts  [x] Storage of milk  [] Importance of pumping minimum of 8x in 24 hours   [] Hands on Pumping   [] Hospital grade pump use and care  [] Initiate setting   [x] Maintain setting  [x] How to rent a hospital grade breast pump- if needed   [x] Engorgement  [] Weighted feeding  [] Nipple shield  [] Latch and positioning   [] Signs of milk transfer  [x] Review how to access lactation consultant prn
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
## 2024-08-05 NOTE — PROGRESS NOTES
[facility] RN Care Coordinator Note    Melissa Albert  [id number]  1985    Chart reviewed, discharge plan discussed with bedside RN, needs assessed. Patient, Melissa, requests home care visit, nurse visit planned Thursday, 24. [facility] Intake contacted, updated by this writer; patient added to [facility] schedule. Follow-up post-delivery and incision check appointment planned in 2 weeks at [facility]. Scott, remains margarette NICU at this time, not ready for discharge.     RN Care Coordinator will continue to follow and assist as needed with discharge plan.

## 2024-08-05 NOTE — PROVIDER NOTIFICATION
Notified Dr. Jimenez that the pt's bp is 147/82. Do you want scheduled labetalol and scheduled nifedipine given earlier or do you want to wait until 0900 when they are due?    Per Dr. Jimenez: Give scheduled nifedipine and scheduled labetalol at 0900 when they are due. No other interventions needed at this time. Only notify MD if blood pressures are in severe range systolic greater than or equal to  160 and diastolic greater than or equal to 110.

## 2024-08-05 NOTE — PLAN OF CARE
Goal Outcome Evaluation:      Plan of Care Reviewed With: patient    Overall Patient Progress: improving    Outcome Evaluation: VSS, ex hypertensive. Pt  denies preeclampsia sypmtoms. Reflexes normal. No clonus. Progressing towards discharge.    VSS, ex hypertensive, scheduled labetalol and and nifedipine administered per orders. Reflexes normal. No clonus. Pt denied all symptoms of preeclampsia. Fundus firm. The pt reported tolerable pain relief with tylenol and flexeril use. Incisional dressing is CDI. Ambulating independently in the room. Pumping and bringing expressed breastmilk to infant in the NICU.

## 2024-08-05 NOTE — PLAN OF CARE
IY=716/75 at 1640, no signs and symptoms of pre-eclampsia, no headache, no visual changes, no epigastric pain, normal reflexes and no clonus.  BP and BP history today with one Systolic BP> 150 this morning but decreased below 150 parameter after antihypertensive medications given by morning nurse.  Dr. Sultana was aware as reported by morning nurse and no change in medications at that time.  Repeat BP was ordered this afternoon per Dr. Sultana order.  On Labetalol 200 mg. Oral every 12 hours and Nifedipine ER 30 mg. Daily.  BP recheck o f 134/75 without signs and symptoms of pre-eclampsia and antihypertensive regimen reported to Dr. Jimenez and no change in current antihypertensive regimen was ordered.  Patient may discharge home today.  [facility] visit set up for 24 for follow up.   working with parents for NICU baby.  Patient has own BP and Breast Pump for home.  Lactation has worked with mom and baby during stay.      Problem: Hypertensive Disorders in Pregnancy  Goal: Patient-Fetal Stabilization  Outcome: Adequate for Care Transition     Problem:  Delivery  Goal: Bleeding is Controlled  Outcome: Adequate for Care Transition  Goal: Stable Fetal Wellbeing  Outcome: Adequate for Care Transition  Intervention: Promote and Monitor Fetal Wellbeing  Recent Flowsheet Documentation  Taken 2024 1640 by Karen Larsen RN  Body Position: position changed independently  Goal: Absence of Infection Signs and Symptoms  Outcome: Adequate for Care Transition  Intervention: Prevent or Manage Infection  Recent Flowsheet Documentation  Taken 2024 1640 by Karen Larsen RN  Infection Prevention:   single patient room provided   rest/sleep promoted   hand hygiene promoted  Goal: Effective Oxygenation and Ventilation  Outcome: Adequate for Care Transition   Goal Outcome Evaluation:  Met

## 2024-08-07 ENCOUNTER — PATIENT OUTREACH (OUTPATIENT)
Dept: CARE COORDINATION | Facility: CLINIC | Age: 39
End: 2024-08-07
Payer: COMMERCIAL

## 2024-08-07 ENCOUNTER — LACTATION ENCOUNTER (OUTPATIENT)
Dept: OTHER | Facility: HOSPITAL | Age: 39
End: 2024-08-07

## 2024-08-07 LAB
PATH REPORT.COMMENTS IMP SPEC: NORMAL
PATH REPORT.FINAL DX SPEC: NORMAL
PATH REPORT.GROSS SPEC: NORMAL
PATH REPORT.MICROSCOPIC SPEC OTHER STN: NORMAL
PATH REPORT.RELEVANT HX SPEC: NORMAL
PHOTO IMAGE: NORMAL

## 2024-08-07 PROCEDURE — 88302 TISSUE EXAM BY PATHOLOGIST: CPT | Mod: 26 | Performed by: PATHOLOGY

## 2024-08-07 PROCEDURE — 88307 TISSUE EXAM BY PATHOLOGIST: CPT | Mod: 26 | Performed by: PATHOLOGY

## 2024-08-07 NOTE — LACTATION NOTE
This note was copied from a baby's chart.  NICU Follow up:    Gestational Age at Delivery: 37w0d     Corrected Gestational Age: 37w5d    Current Age: 5 DO     Method of Feedings: gavage, breast and bottle     Breastfeeding goals:12+months    Breast Assessment:Round and Symmetrical, Everted    Feeding Assessment: Infant was very sleepy this attempt. Mom reported that yesterday he was willing to latch 2 times the first one was not weighed and she believes he did a great job and the second he was sleepy and weighed he transferred 5 ml.        Hand Hugs/STS/Nuzzling/Latching: latching and sts     Breastfeeding: when cueing     Pumping Volume p/24hours: is going very well  ml or more with each pump.      Adjustments to Plan:  reinforced feeding expectation for an infant who is was born at 37 weeks, attempt latch for 5- 10 min when he is awake and cueing.  No need to reweigh if he is not active at the breast.       Education:  [] First drops kit  [] Benefits of breast milk  [] How breast milk is made  [] Stages of milk production  [] Milk supply/goal volumes  [] Hand expression  [] Collecting, labeling, transporting milk  [] Cleaning, sanitizing pump parts  [] Storage of milk  [] Importance of pumping minimum of 8x in 24 hours   [] Hands on Pumping   [] Hospital grade pump use and care  [] Initiate setting   [] Maintain setting  [] How to rent a hospital grade breast pump  [] Engorgement  [] Weighted feeding  [] Nipple shield  [x] Latch and positioning   [] Signs of milk transfer  [] Review how to access lactation consultant prn

## 2024-08-07 NOTE — PROGRESS NOTES
Connected Care Resource Center:   [facility] Resource [facility] Contact  [facility]/Voicemail     Clinical Data: Post-Discharge Outreach     Outreach attempted x 2.  Left message on patient's voicemail, providing [facility]'s central phone number of [phone] ([phone]) for questions/concerns and/or to schedule an appt with an [facility] provider, if they do not have a PCP.      Plan:  [facility] will do no further outreaches at this time.       Maritza Rashid MA  Connected Care Resource Center, [facility]    *Connected Care Resource Team does NOT follow patient ongoing. Referrals are identified based on internal discharge reports and the outreach is to ensure patient has an understanding of their discharge instructions.

## 2024-08-08 ENCOUNTER — LACTATION ENCOUNTER (OUTPATIENT)
Dept: OTHER | Facility: HOSPITAL | Age: 39
End: 2024-08-08

## 2024-08-08 NOTE — LACTATION NOTE
This note was copied from a baby's chart.  NICU Follow up:    Gestational Age at Delivery: 37w0d     Corrected Gestational Age: 37w6d    Current Age: 6 days    Method of Feedings: Breast, Bottle, NG     Breast Assessment:Round and Soft , Everted and Intact    Feeding Assessment: 48% oral    Hand Hugs/STS/Nuzzling/Latching: Attempted to latch infant. Initially in a cradle position which was adjusted to cross cradle for more comfort. Infant too sleepy and no latch was achieved.     Breastfeeding: Continues to attempt. She verbalizes some success and some sleepy feedings. Staff are doing weighted feedings.    Pumping Volume p/24hours: Verbalized pumping 400ml daily. Please clarify as this may not be accurate compared to volumes pumping 2 day ago.     Adjustments to Plan: Encouraged Melissa to continue to offer breast and ask RN staff to support learning positioning. LC to follow up tomorrow for latch support when infant more wakeful.    Education:  [] First drops kit  [] Benefits of breast milk  [] How breast milk is made  [] Stages of milk production  [] Milk supply/goal volumes  [] Hand expression  [] Collecting, labeling, transporting milk  [] Cleaning, sanitizing pump parts  [] Storage of milk  [] Importance of pumping minimum of 8x in 24 hours   [] Hands on Pumping   [] Hospital grade pump use and care  [] Initiate setting   [] Maintain setting  [] How to rent a hospital grade breast pump  [] Engorgement  [] Weighted feeding  [] Nipple shield  [x] Latch and positioning   [x] Signs of milk transfer  [x] Review how to access lactation consultant prn

## 2024-08-09 ENCOUNTER — LACTATION ENCOUNTER (OUTPATIENT)
Dept: OTHER | Facility: HOSPITAL | Age: 39
End: 2024-08-09

## 2024-08-09 NOTE — LACTATION NOTE
Preventive Service Test/Service Date Last Done Due Now    Colorectal Cancer Screening (Age 50+) [x] Colonoscopy (Once every 10 years, high risk every  2 years)  [] Flexible Sigmoidoscopy (Once every 4 years or 10 years after previous colonoscopy)  [] Fecal Occult Blood Test (Once per year) 3/11/13          Prostate Cancer Screening [x] PSA (Age 50+, once per year) 04/2017    Cardiovascular Screening  (Once every 5 years) [x] Lipid Panel without reflex LDL  100  [x] Total Cholesterol 169  [x] HDL Cholesterol  51  [x] Triglycerides 88 2/22/16 ordered   Abdominal Aortic Aneurysm Screening  (Once in a lifetime benefit. Must be as a referral from an IPPE. Must be one of the indications at right.) [] Screening Ultrasound   INDICATIONS:  [] Family History of AAA  [] Male 65-75 with history of Smoking > 100 cigarettes in their life     Diabetes Screening  (Once yearly) Fasting Blood Sugar 84  On  1/2018    Diabetes Self-Management Outpatient Diabetes Education     Vision/Glaucoma Screening  (Once per year) Schedule with your eye doctor  DUE   Bone Mass Measurement  (Once every 2 years) Dexa Scan 12/28/17    Nutrition Services(3 hours of 1 on 1 instruction per year, 2 hours each year after) [] Diabetes  [] Renal Disease  [] Other      Immunizations  *Based on insurance coverage [] Pneumococcal [] Influenza  [] Hepatitis B        [] TdaP/Td*  [] Shingles* Zoster, pneumo 23, tdap    Smoking Cessation  (8 face to face visits during 1 year) [] Referral to [place] Quit Line     Advance Directive [] Bring in Copy [] Copy Given  DUE    Other (specify)      Type of Exam [] Initial Preventive Physical Exam/Welcome to Medicare (within 12 mos of MCR Part B)    [] Annual Initial Wellness Exam (12 mos after Medicare Part B coverage or IPPE exam)    [x] Annual Subsequent Wellness Exam (1 year after initial exam, once every 12 months)        This note was copied from a baby's chart.  A short attempt was made to latch infant with and without nipple shield.  Infant mouthed nipple sucked a few times but did not maintain sucking pattern, attempt was short so ended and infant bottle feed due to very short attempt.

## 2024-08-11 ENCOUNTER — LACTATION ENCOUNTER (OUTPATIENT)
Dept: OTHER | Facility: HOSPITAL | Age: 39
End: 2024-08-11

## 2024-08-11 NOTE — LACTATION NOTE
This note was copied from a baby's chart.  NICU Follow up:    Gestational Age at Delivery: 37w0d     Corrected Gestational Age: 38w2d    Current Age: 9do    Method of Feedings: bottle, breast attempts    Feeding Assessment: 95% PO    Hand Hugs/STS/Nuzzling/Latching: nuzzling, latching    Breastfeeding: Mother was holding infant today, explained that infant is not showing much feeding cues. She has a nipple shield to try. Infant is bottling well. Encouraged Mother to continue to practice breastfeeding. Mother will call if she wants assistance with latching.     Pumping Volume p/24hours: Mother verbalizes 600ml/day.     Adjustments to Plan: continue to offer breast.     Education:  [] First drops kit  [] Benefits of breast milk  [] How breast milk is made  [] Stages of milk production  [] Milk supply/goal volumes  [] Hand expression  [] Collecting, labeling, transporting milk  [] Cleaning, sanitizing pump parts  [] Storage of milk  [] Importance of pumping minimum of 8x in 24 hours   [] Hands on Pumping   [] Hospital grade pump use and care  [] Initiate setting   [] Maintain setting  [] How to rent a hospital grade breast pump  [] Engorgement  [] Weighted feeding  [] Nipple shield  [] Latch and positioning   [] Signs of milk transfer  [x] Review how to access lactation consultant prn

## 2024-09-14 ENCOUNTER — HEALTH MAINTENANCE LETTER (OUTPATIENT)
Age: 39
End: 2024-09-14

## 2024-10-02 ENCOUNTER — OFFICE VISIT (OUTPATIENT)
Dept: INTERNAL MEDICINE | Facility: CLINIC | Age: 39
End: 2024-10-02
Payer: COMMERCIAL

## 2024-10-02 VITALS
HEIGHT: 63 IN | SYSTOLIC BLOOD PRESSURE: 117 MMHG | DIASTOLIC BLOOD PRESSURE: 79 MMHG | OXYGEN SATURATION: 96 % | TEMPERATURE: 98.6 F | WEIGHT: 240.3 LBS | RESPIRATION RATE: 18 BRPM | BODY MASS INDEX: 42.58 KG/M2 | HEART RATE: 95 BPM

## 2024-10-02 DIAGNOSIS — I10 PRIMARY HYPERTENSION: Primary | ICD-10-CM

## 2024-10-02 DIAGNOSIS — E11.9 TYPE 2 DIABETES MELLITUS WITHOUT COMPLICATION, WITH LONG-TERM CURRENT USE OF INSULIN (H): ICD-10-CM

## 2024-10-02 DIAGNOSIS — Z79.4 TYPE 2 DIABETES MELLITUS WITHOUT COMPLICATION, WITH LONG-TERM CURRENT USE OF INSULIN (H): ICD-10-CM

## 2024-10-02 PROBLEM — N93.9 VAGINAL BLEEDING: Status: RESOLVED | Noted: 2024-07-09 | Resolved: 2024-10-02

## 2024-10-02 PROBLEM — O24.113 TYPE 2 DIABETES MELLITUS COMPLICATING PREGNANCY IN THIRD TRIMESTER, ANTEPARTUM: Status: RESOLVED | Noted: 2022-07-29 | Resolved: 2024-10-02

## 2024-10-02 PROBLEM — O02.1 MISSED ABORTION: Status: RESOLVED | Noted: 2023-08-24 | Resolved: 2024-10-02

## 2024-10-02 PROBLEM — Z98.891 S/P REPEAT LOW TRANSVERSE C-SECTION: Status: RESOLVED | Noted: 2024-08-02 | Resolved: 2024-10-02

## 2024-10-02 PROBLEM — Z98.890 S/P LAPAROTOMY: Status: RESOLVED | Noted: 2018-07-05 | Resolved: 2024-10-02

## 2024-10-02 PROBLEM — O46.90 VAGINAL BLEEDING IN PREGNANCY: Status: RESOLVED | Noted: 2024-06-10 | Resolved: 2024-10-02

## 2024-10-02 PROBLEM — Z98.891 STATUS POST REPEAT LOW TRANSVERSE CESAREAN SECTION: Status: RESOLVED | Noted: 2024-08-02 | Resolved: 2024-10-02

## 2024-10-02 PROCEDURE — G2211 COMPLEX E/M VISIT ADD ON: HCPCS | Performed by: INTERNAL MEDICINE

## 2024-10-02 PROCEDURE — 99203 OFFICE O/P NEW LOW 30 MIN: CPT | Performed by: INTERNAL MEDICINE

## 2024-10-02 RX ORDER — NIFEDIPINE 30 MG
30 TABLET, EXTENDED RELEASE ORAL DAILY
Qty: 90 TABLET | Refills: 2 | Status: SHIPPED | OUTPATIENT
Start: 2024-10-02

## 2024-10-02 ASSESSMENT — ASTHMA QUESTIONNAIRES
QUESTION_5 LAST FOUR WEEKS HOW WOULD YOU RATE YOUR ASTHMA CONTROL: COMPLETELY CONTROLLED
QUESTION_1 LAST FOUR WEEKS HOW MUCH OF THE TIME DID YOUR ASTHMA KEEP YOU FROM GETTING AS MUCH DONE AT WORK, SCHOOL OR AT HOME: NONE OF THE TIME
QUESTION_3 LAST FOUR WEEKS HOW OFTEN DID YOUR ASTHMA SYMPTOMS (WHEEZING, COUGHING, SHORTNESS OF BREATH, CHEST TIGHTNESS OR PAIN) WAKE YOU UP AT NIGHT OR EARLIER THAN USUAL IN THE MORNING: NOT AT ALL
QUESTION_4 LAST FOUR WEEKS HOW OFTEN HAVE YOU USED YOUR RESCUE INHALER OR NEBULIZER MEDICATION (SUCH AS ALBUTEROL): NOT AT ALL
QUESTION_2 LAST FOUR WEEKS HOW OFTEN HAVE YOU HAD SHORTNESS OF BREATH: NOT AT ALL
ACT_TOTALSCORE: 25
ACT_TOTALSCORE: 25

## 2024-10-02 ASSESSMENT — PATIENT HEALTH QUESTIONNAIRE - PHQ9
SUM OF ALL RESPONSES TO PHQ QUESTIONS 1-9: 1
SUM OF ALL RESPONSES TO PHQ QUESTIONS 1-9: 1
10. IF YOU CHECKED OFF ANY PROBLEMS, HOW DIFFICULT HAVE THESE PROBLEMS MADE IT FOR YOU TO DO YOUR WORK, TAKE CARE OF THINGS AT HOME, OR GET ALONG WITH OTHER PEOPLE: NOT DIFFICULT AT ALL

## 2024-10-02 ASSESSMENT — PAIN SCALES - GENERAL: PAINLEVEL: NO PAIN (0)

## 2024-10-02 NOTE — ASSESSMENT & PLAN NOTE
"Follows with endocrinology at [facility], Dr. Millan. He terms her \"type 1.5 diabetes\".  Quired much more insulin during pregnancy, now back down to prepregnancy amount with Lantus 36 units at night.  -Continue to follow with endocrinology, next visit is scheduled in November  "

## 2024-10-02 NOTE — ASSESSMENT & PLAN NOTE
Just had issues with hypertension postpartum with her most recent pregnancy 8/2024.  Initially started on labetalol and nifedipine, labetalol was able to be stopped at 4 weeks postpartum.  However, she has not been able to come off nifedipine.  We are now 8 weeks postpartum, would term this more chronic hypertension.  Patient is still breast-feeding.  - Continue nifedipine ER 30 mg daily  - Could trial stopping only if home SBP consistently <120, if she does this, she will continue to watch BP closely at home and add back if BP rises again   - Would not switch to alternative agent until she is no longer breastfeeding, her goal is 1 year

## 2024-10-02 NOTE — PROGRESS NOTES
"  Assessment & Plan   Problem List Items Addressed This Visit          Endocrine    RESOLVED: Type 2 diabetes mellitus complicating pregnancy in third trimester, antepartum       Circulatory    Primary hypertension - Primary     Just had issues with hypertension postpartum with her most recent pregnancy 8/2024.  Initially started on labetalol and nifedipine, labetalol was able to be stopped at 4 weeks postpartum.  However, she has not been able to come off nifedipine.  We are now 8 weeks postpartum, would term this more chronic hypertension.  Patient is still breast-feeding.  - Continue nifedipine ER 30 mg daily  - Could trial stopping only if home SBP consistently <120, if she does this, she will continue to watch BP closely at home and add back if BP rises again   - Would not switch to alternative agent until she is no longer breastfeeding, her goal is 1 year         Relevant Medications    NIFEdipine ER (ADALAT CC) 30 MG 24 hr tablet      The longitudinal plan of care for the diagnosis(es)/condition(s) as documented were addressed during this visit. Due to the added complexity in care, I will continue to support Melissa in the subsequent management and with ongoing continuity of care.       BMI  Estimated body mass index is 42.57 kg/m  as calculated from the following:    Height as of this encounter: 1.6 m (5' 3\").    Weight as of this encounter: 109 kg (240 lb 4.8 oz).   Weight management plan: Discussed healthy diet and exercise guidelines    FUTURE APPOINTMENTS:       - Follow-up visit in 6 months for physical       Subjective   Melissa is a 39 year old, presenting for the following health issues:  Diabetes, Establish Care, and Hypertension (Post-partum hypertension )        10/2/2024     7:15 AM   Additional Questions   Roomed by CARL Shanks   Accompanied by Self     Via the Health Maintenance questionnaire, the patient has reported the following services have been completed -Eye Exam: [facility] " "2024-Cervical Cancer Screening: [facility] 2024, this information has been sent to the abstraction team.    History of Present Illness     Reason for visit:  [facility]      Had second baby just in August. I see her . Two boys (first is two).     Diabetes: Continues to follow with Dr. Millan at [facility]. Next visit 24. Was on up to 120U of lantus during pregnancy, now 36U at night (this is close to pre-pregnancy amount). Prior to pregnancy, also metformin.     HTN: Only post-partum HTN. Was started on labetalol 200 mg BID and nifedipine 30 mg 2 days after delivery. Labetalol stopped at 4 weeks. Continues on nifedipine 30 mg daily. No plan for future pregnancy, s/p salpingectomy with  the last time. Is breastfeeding.   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 No inhaler for 8-10 years.     Depression only surrounding death of family member in the past.     Review of Systems  Constitutional, neuro, ENT, endocrine, pulmonary, cardiac, gastrointestinal, genitourinary, musculoskeletal, integument and psychiatric systems are negative, except as otherwise noted.        Objective    /79 (BP Location: Left arm, Patient Position: Sitting, Cuff Size: Adult Large)   Pulse 95   Temp 98.6  F (37  C) (Oral)   Resp 18   Ht 1.6 m (5' 3\")   Wt 109 kg (240 lb 4.8 oz)   LMP 2023 (Exact Date)   SpO2 96%   Breastfeeding Yes   BMI 42.57 kg/m    Body mass index is 42.57 kg/m .  Physical Exam   GENERAL: alert and no distress  EYES: Eyes grossly normal to inspection and conjunctivae and sclerae normal  HENT: nose and mouth without ulcers or lesions  RESP: lungs clear to auscultation - no rales, rhonchi or wheezes  CV: regular rate and rhythm, normal S1 S2, no S3 or S4, no murmur, click or rub, no peripheral edema  MS: no gross musculoskeletal defects noted, no edema  SKIN: no suspicious lesions or rashes on exposed skin   NEURO: Normal strength " and tone, mentation intact and speech normal  PSYCH: mentation appears normal, affect normal/bright          Signed Electronically by: Ester Varela MD

## 2024-10-04 ENCOUNTER — OFFICE VISIT (OUTPATIENT)
Dept: ORTHOPEDICS | Facility: CLINIC | Age: 39
End: 2024-10-04
Payer: COMMERCIAL

## 2024-10-04 VITALS — SYSTOLIC BLOOD PRESSURE: 127 MMHG | DIASTOLIC BLOOD PRESSURE: 62 MMHG

## 2024-10-04 DIAGNOSIS — M65.311 TRIGGER THUMB, RIGHT THUMB: ICD-10-CM

## 2024-10-04 DIAGNOSIS — M65.4 DE QUERVAIN'S TENOSYNOVITIS, RIGHT: Primary | ICD-10-CM

## 2024-10-04 PROCEDURE — 99213 OFFICE O/P EST LOW 20 MIN: CPT | Mod: 25 | Performed by: STUDENT IN AN ORGANIZED HEALTH CARE EDUCATION/TRAINING PROGRAM

## 2024-10-04 PROCEDURE — 20550 NJX 1 TENDON SHEATH/LIGAMENT: CPT | Mod: RT | Performed by: STUDENT IN AN ORGANIZED HEALTH CARE EDUCATION/TRAINING PROGRAM

## 2024-10-04 PROCEDURE — 76942 ECHO GUIDE FOR BIOPSY: CPT | Performed by: STUDENT IN AN ORGANIZED HEALTH CARE EDUCATION/TRAINING PROGRAM

## 2024-10-04 RX ORDER — ROPIVACAINE HYDROCHLORIDE 5 MG/ML
1 INJECTION, SOLUTION EPIDURAL; INFILTRATION; PERINEURAL
Status: SHIPPED | OUTPATIENT
Start: 2024-10-04

## 2024-10-04 RX ORDER — BETAMETHASONE SODIUM PHOSPHATE AND BETAMETHASONE ACETATE 3; 3 MG/ML; MG/ML
6 INJECTION, SUSPENSION INTRA-ARTICULAR; INTRALESIONAL; INTRAMUSCULAR; SOFT TISSUE
Status: SHIPPED | OUTPATIENT
Start: 2024-10-04

## 2024-10-04 RX ADMIN — BETAMETHASONE SODIUM PHOSPHATE AND BETAMETHASONE ACETATE 6 MG: 3; 3 INJECTION, SUSPENSION INTRA-ARTICULAR; INTRALESIONAL; INTRAMUSCULAR; SOFT TISSUE at 13:32

## 2024-10-04 RX ADMIN — ROPIVACAINE HYDROCHLORIDE 1 ML: 5 INJECTION, SOLUTION EPIDURAL; INFILTRATION; PERINEURAL at 13:32

## 2024-10-04 NOTE — PROGRESS NOTES
ASSESSMENT & PLAN    Melissa was seen today for pain.    Diagnoses and all orders for this visit:    De Quervain's tenosynovitis, right  -     Hand / Upper Extremity Injection/Arthrocentesis: R extensor compartment 1    Trigger thumb, right thumb      This issue is acute and Worsening. Melissa presents our clinic today to discuss her acute right hand and wrist pain.  On examination, she has pain over the dorsal, radial aspect of the wrist that will radiate up into the forearm.  She also endorses catching and locking of the right thumb, which feels similar to her previous trigger thumb on the left.  She has positive Finkelstein's test on the right, consistent with de Quervain's tenosynovitis as well as a trigger thumb on the right thumb.  We discussed these findings and the treatment options including anti-inflammatory medicines, bracing, topical medications, corticosteroid injections, and surgical intervention.  The patient would like to proceed with corticosteroid injection for these issues, however we discussed that we could only perform a corticosteroid injection to 1 area at a time given the patient's underlying type 2 diabetes.  At this point the patient would like to proceed with an injection to the first dorsal extensor compartment, as this is more painful for her today.  We determine the following plan:  - CSI to the right first dorsal extensor compartment performed today under ultrasound guidance, see procedure note below for details  - She will use topical Voltaren gel to the area  - She can otherwise use ibuprofen and Tylenol as needed  - She will follow-up in our clinic in 2 weeks for a CSI to her right trigger thumb      Hand / Upper Extremity Injection/Arthrocentesis: R extensor compartment 1    Date/Time: 10/4/2024 1:32 PM    Performed by: Jayant Forde DO  Authorized by: Jayant Forde DO    Needle Size:  25 G  Guidance: ultrasound    Approach:  Radial  Condition: de Quervain's      Site:  R extensor  compartment 1  Medications:  6 mg betamethasone acet & sod phos 6 (3-3) MG/ML; 1 mL ROPivacaine 5 MG/ML  Outcome:  Tolerated well, no immediate complications  Procedure discussed: discussed risks, benefits, and alternatives    Consent Given by:  Patient  Timeout: timeout called immediately prior to procedure    Prep: patient was prepped and draped in usual sterile fashion     Ultrasound was used to ensure safe and accurate needle placement and injection. Ultrasound images of the procedure were permanently stored.        Jayant Forde DO  [facility]    -----  Chief Complaint   Patient presents with    Right Hand - Pain       SUBJECTIVE  Melissa Albert is a/an 39 year old female who is seen as a self referral for evaluation of r thumb trigger thumb. Interested in CSI today    The patient is seen by themselves.  The patient is Right handed    Onset: got worse 2 month(s) ago. Reports insidious onset without acute precipitating event.  Location of Pain: right thumb and wrist  Worsened by: picking up kids, flexion or extension at the wrist, grabbing things  Better with: Nothing  Treatments tried: Tylenol  Associated symptoms: tingling and locking or catching    Orthopedic/Surgical history: YES - Date: CT release 2012 bilateral wrist; trigger thumb surgery in 1/2023  Social History/Occupation: Maternity leave      REVIEW OF SYSTEMS:  Review of systems negative unless mentioned in HPI     OBJECTIVE:  /62   LMP 09/16/2023 (Exact Date)    General: healthy, alert and in no distress  Skin: no suspicious lesions or rash.  CV: distal perfusion intact   Resp: normal respiratory effort without conversational dyspnea   Psych: normal mood and affect  Gait: NORMAL  Neuro: Normal light sensory exam of ru extremity     Hand Exam    Left  Right   Inspection No atrophy, erythema , echymosis, or deformity  No atrophy, erythema , echymosis, or deformity    Palpation         Tenderness  over    No tenderness to palpation of radial styloid, DRUJ, TFCC, scaphoid/snuffbox TTP radial styloid, 1st dorsal extensor  No tenderness to palpation of DRUJ, TFCC, scaphoid/snuffbox   Range of Motion        1st digit IP flex/ext Normal Normal      DIP flexion/extension  Normal 2nd-5th Normal 2nd-5th      PIP flexion/extension  Normal 2nd-5th Normal 2nd-5th      MCP flexion/extension Normal Normal   Strength      1st digit IP flex/ext Full Full    DIP flexion/extension Full Full    PIP flexion/extension  Full Full    MCP flexion/extension Full Full   Pain with resisted None None   Vascular   Intact  Intact    Special Tests   1st MCP valgus 0/30 normal   Neg CMC grind  Triggering right thumb  Positive finkelstein's R   Able to make 'OK' sign (AIN)  Intact resisted abduction of digits    Sensation Intact to median, ulnar, and radial nerve distributions          RADIOLOGY:  No imaging taken in clinic today

## 2024-10-04 NOTE — LETTER
10/4/2024      Melissa Albert  [address]  [place] [address]      Dear Colleague,    Thank you for referring your patient, Melissa Albert, to the [facility]INE CLINIC [facility]. Please see a copy of my visit note below.    ASSESSMENT & PLAN    Melissa was seen today for pain.    Diagnoses and all orders for this visit:    De Quervain's tenosynovitis, right  -     Hand / Upper Extremity Injection/Arthrocentesis: R extensor compartment 1    Trigger thumb, right thumb      This issue is acute and Worsening. Melissa presents our clinic today to discuss her acute right hand and wrist pain.  On examination, she has pain over the dorsal, radial aspect of the wrist that will radiate up into the forearm.  She also endorses catching and locking of the right thumb, which feels similar to her previous trigger thumb on the left.  She has positive Finkelstein's test on the right, consistent with de Quervain's tenosynovitis as well as a trigger thumb on the right thumb.  We discussed these findings and the treatment options including anti-inflammatory medicines, bracing, topical medications, corticosteroid injections, and surgical intervention.  The patient would like to proceed with corticosteroid injection for these issues, however we discussed that we could only perform a corticosteroid injection to 1 area at a time given the patient's underlying type 2 diabetes.  At this point the patient would like to proceed with an injection to the first dorsal extensor compartment, as this is more painful for her today.  We determine the following plan:  - CSI to the right first dorsal extensor compartment performed today under ultrasound guidance, see procedure note below for details  - She will use topical Voltaren gel to the area  - She can otherwise use ibuprofen and Tylenol as needed  - She will follow-up in our clinic in 2 weeks for a CSI to her right trigger thumb      Hand / Upper Extremity  Injection/Arthrocentesis: R extensor compartment 1    Date/Time: 10/4/2024 1:32 PM    Performed by: Jayant Forde DO  Authorized by: Jayant Forde DO    Needle Size:  25 G  Guidance: ultrasound    Approach:  Radial  Condition: de Quervain's      Site:  R extensor compartment 1  Medications:  6 mg betamethasone acet & sod phos 6 (3-3) MG/ML; 1 mL ROPivacaine 5 MG/ML  Outcome:  Tolerated well, no immediate complications  Procedure discussed: discussed risks, benefits, and alternatives    Consent Given by:  Patient  Timeout: timeout called immediately prior to procedure    Prep: patient was prepped and draped in usual sterile fashion     Ultrasound was used to ensure safe and accurate needle placement and injection. Ultrasound images of the procedure were permanently stored.        Jayant Forde DO  [facility] SP[facility]TS MEDICINE [facility]    -----  Chief Complaint   Patient presents with     Right Hand - Pain       SUBJECTIVE  Melissa Albert is a/an 39 year old female who is seen as a self referral for evaluation of r thumb trigger thumb. Interested in CSI today    The patient is seen by themselves.  The patient is Right handed    Onset: got worse 2 month(s) ago. Reports insidious onset without acute precipitating event.  Location of Pain: right thumb and wrist  Worsened by: picking up kids, flexion or extension at the wrist, grabbing things  Better with: Nothing  Treatments tried: Tylenol  Associated symptoms: tingling and locking or catching    Orthopedic/Surgical history: YES - Date: CT release 2012 bilateral wrist; trigger thumb surgery in 1/2023  Social History/Occupation: Maternity leave      REVIEW OF SYSTEMS:  Review of systems negative unless mentioned in HPI     OBJECTIVE:  /62   LMP 09/16/2023 (Exact Date)    General: healthy, alert and in no distress  Skin: no suspicious lesions or rash.  CV: distal perfusion intact   Resp: normal respiratory effort without conversational  dyspnea   Psych: normal mood and affect  Gait: NORMAL  Neuro: Normal light sensory exam of ru extremity     Hand Exam    Left  Right   Inspection No atrophy, erythema , echymosis, or deformity  No atrophy, erythema , echymosis, or deformity    Palpation         Tenderness over    No tenderness to palpation of radial styloid, DRUJ, TFCC, scaphoid/snuffbox TTP radial styloid, 1st dorsal extensor  No tenderness to palpation of DRUJ, TFCC, scaphoid/snuffbox   Range of Motion        1st digit IP flex/ext Normal Normal      DIP flexion/extension  Normal 2nd-5th Normal 2nd-5th      PIP flexion/extension  Normal 2nd-5th Normal 2nd-5th      MCP flexion/extension Normal Normal   Strength      1st digit IP flex/ext Full Full    DIP flexion/extension Full Full    PIP flexion/extension  Full Full    MCP flexion/extension Full Full   Pain with resisted None None   Vascular   Intact  Intact    Special Tests   1st MCP valgus 0/30 normal   Neg CMC grind  Triggering right thumb  Positive finkelstein's R   Able to make 'OK' sign (AIN)  Intact resisted abduction of digits    Sensation Intact to median, ulnar, and radial nerve distributions          RADIOLOGY:  No imaging taken in clinic today         Again, thank you for allowing me to participate in the care of your patient.        Sincerely,        Jayant Forde, DO

## 2024-10-25 ENCOUNTER — OFFICE VISIT (OUTPATIENT)
Dept: ORTHOPEDICS | Facility: CLINIC | Age: 39
End: 2024-10-25
Payer: COMMERCIAL

## 2024-10-25 DIAGNOSIS — M65.311 TRIGGER THUMB, RIGHT THUMB: Primary | ICD-10-CM

## 2024-10-25 PROCEDURE — 20550 NJX 1 TENDON SHEATH/LIGAMENT: CPT | Mod: F5 | Performed by: STUDENT IN AN ORGANIZED HEALTH CARE EDUCATION/TRAINING PROGRAM

## 2024-10-25 PROCEDURE — 76942 ECHO GUIDE FOR BIOPSY: CPT | Performed by: STUDENT IN AN ORGANIZED HEALTH CARE EDUCATION/TRAINING PROGRAM

## 2024-10-25 RX ORDER — ROPIVACAINE HYDROCHLORIDE 5 MG/ML
1 INJECTION, SOLUTION EPIDURAL; INFILTRATION; PERINEURAL
Status: COMPLETED | OUTPATIENT
Start: 2024-10-25 | End: 2024-10-25

## 2024-10-25 RX ORDER — BETAMETHASONE SODIUM PHOSPHATE AND BETAMETHASONE ACETATE 3; 3 MG/ML; MG/ML
6 INJECTION, SUSPENSION INTRA-ARTICULAR; INTRALESIONAL; INTRAMUSCULAR; SOFT TISSUE
Status: COMPLETED | OUTPATIENT
Start: 2024-10-25 | End: 2024-10-25

## 2024-10-25 RX ADMIN — ROPIVACAINE HYDROCHLORIDE 1 ML: 5 INJECTION, SOLUTION EPIDURAL; INFILTRATION; PERINEURAL at 11:40

## 2024-10-25 RX ADMIN — BETAMETHASONE SODIUM PHOSPHATE AND BETAMETHASONE ACETATE 6 MG: 3; 3 INJECTION, SUSPENSION INTRA-ARTICULAR; INTRALESIONAL; INTRAMUSCULAR; SOFT TISSUE at 11:40

## 2024-10-25 NOTE — PROGRESS NOTES
Sports Medicine Procedure Note    Melissa was seen today for pain.    Diagnoses and all orders for this visit:    Trigger thumb, right thumb  -     Hand / Upper Extremity Injection/Arthrocentesis: R thumb A1        Melissa Albert is a/an 39 year old female who is seen for Corticosteroid injection of right thumb.   Last injection: 10/4/24 of the right wrist that has provided great relief.    -CSI to the A1 pulley of the right thumb performed today under ultrasound guidance, see procedure note below for details  -Post-injection instructions were provided to the patient    Follow-up as needed      Post-Injection Discharge Instructions    You may shower, however avoid swimming, tub baths or hot tubs for 24 hours following your procedure  You may have a mild to moderate increase in pain for a few days following the injection.  The lidocaine (local numbing medicine) will wear off in several hours. It usually takes 3-5 days for the steroid medication to start working although it may take up to 14 days for full effect.   You may use ice packs for 10-15 minutes, 3 to 4 times a day at the injection site for comfort if needed  You may use extra strength Tylenol for pain control if necessary   If you were fasting, you may resume your normal diet and medications after the procedure  If you have diabetes, your blood sugar may be higher than normal for 10-14 days following a steroid injection. Contact your doctor who manages your diabetes if your blood sugar is significantly higher than usual    If you experience any of the following, call Sports Medicine @  [phone]  -Fever over 100 degrees F  -Swelling, bleeding, redness, drainage, warmth at the injection site  -New or significant worsening pain    Hand / Upper Extremity Injection/Arthrocentesis: R thumb A1    Date/Time: 10/25/2024 11:40 AM    Performed by: Jayant Forde DO  Authorized by: Jayant Forde DO    Indications:  Pain and therapeutic  Needle Size:  25 G  Guidance:  ultrasound    Approach:  Volar  Condition: trigger finger    Location:  Thumb    Site:  R thumb A1  Medications:  6 mg betamethasone acet & sod phos 6 (3-3) MG/ML; 1 mL ROPivacaine 5 MG/ML  Outcome:  Tolerated well, no immediate complications  Procedure discussed: discussed risks, benefits, and alternatives    Consent Given by:  Patient  Timeout: timeout called immediately prior to procedure    Prep: patient was prepped and draped in usual sterile fashion     Ultrasound was used to ensure safe and accurate needle placement and injection. Ultrasound images of the procedure were permanently stored.          Dr. ARACELI Forde, DO CAQ  [facility]  Sports Medicine     -----

## 2024-10-25 NOTE — LETTER
10/25/2024      Melissa Albert  [address] [place] [address]      Dear Colleague,    Thank you for referring your patient, Melissa Albert, to the [facility]ORTS MEDICINE CLIN[facility]. Please see a copy of my visit note below.    Sports Medicine Procedure Note    Melissa was seen today for pain.    Diagnoses and all orders for this visit:    Trigger thumb, right thumb  -     Hand / Upper Extremity Injection/Arthrocentesis: R thumb A1        Melissa Albert is a/an 39 year old female who is seen for Corticosteroid injection of right thumb.   Last injection: 10/4/24 of the right wrist that has provided great relief.    -CSI to the A1 pulley of the right thumb performed today under ultrasound guidance, see procedure note below for details  -Post-injection instructions were provided to the patient    Follow-up as needed      Post-Injection Discharge Instructions    You may shower, however avoid swimming, tub baths or hot tubs for 24 hours following your procedure  You may have a mild to moderate increase in pain for a few days following the injection.  The lidocaine (local numbing medicine) will wear off in several hours. It usually takes 3-5 days for the steroid medication to start working although it may take up to 14 days for full effect.   You may use ice packs for 10-15 minutes, 3 to 4 times a day at the injection site for comfort if needed  You may use extra strength Tylenol for pain control if necessary   If you were fasting, you may resume your normal diet and medications after the procedure  If you have diabetes, your blood sugar may be higher than normal for 10-14 days following a steroid injection. Contact your doctor who manages your diabetes if your blood sugar is significantly higher than usual    If you experience any of the following, call Sports Medicine @  [phone]  -Fever over 100 degrees F  -Swelling, bleeding, redness, drainage, warmth at the injection site  -New or significant  worsening pain    Hand / Upper Extremity Injection/Arthrocentesis: R thumb A1    Date/Time: 10/25/2024 11:40 AM    Performed by: Jayant Forde DO  Authorized by: Jayant Forde DO    Indications:  Pain and therapeutic  Needle Size:  25 G  Guidance: ultrasound    Approach:  Volar  Condition: trigger finger    Location:  Thumb    Site:  R thumb A1  Medications:  6 mg betamethasone acet & sod phos 6 (3-3) MG/ML; 1 mL ROPivacaine 5 MG/ML  Outcome:  Tolerated well, no immediate complications  Procedure discussed: discussed risks, benefits, and alternatives    Consent Given by:  Patient  Timeout: timeout called immediately prior to procedure    Prep: patient was prepped and draped in usual sterile fashion     Ultrasound was used to ensure safe and accurate needle placement and injection. Ultrasound images of the procedure were permanently stored.          Dr. ARACELI Forde DO CAQ  [facility]  Sports Medicine     -----      Again, thank you for allowing me to participate in the care of your patient.        Sincerely,        Jayant Forde DO

## 2025-01-11 ENCOUNTER — HEALTH MAINTENANCE LETTER (OUTPATIENT)
Age: 40
End: 2025-01-11

## 2025-03-10 ENCOUNTER — TELEPHONE (OUTPATIENT)
Dept: FAMILY MEDICINE | Facility: CLINIC | Age: 40
End: 2025-03-10

## 2025-03-10 NOTE — TELEPHONE ENCOUNTER
Patient called back to reschedule her virtual appointment today. Assisted in scheduling. Patient is now scheduled to see RJ tomorrow afternoon in-person clinic visit.       Hill Dubose, MSN, RN   [facility]

## 2025-03-10 NOTE — TELEPHONE ENCOUNTER
Left message to call  Has a video appointment today with CARLOS MANUEL.   CARLOS MANUEL would prefer to see her in person. When patient returns call change video to in person and have patient arrive at [phone].

## 2025-03-11 ENCOUNTER — OFFICE VISIT (OUTPATIENT)
Dept: FAMILY MEDICINE | Facility: CLINIC | Age: 40
End: 2025-03-11
Payer: COMMERCIAL

## 2025-03-11 VITALS
HEIGHT: 64 IN | RESPIRATION RATE: 16 BRPM | SYSTOLIC BLOOD PRESSURE: 137 MMHG | HEART RATE: 111 BPM | BODY MASS INDEX: 43.21 KG/M2 | TEMPERATURE: 99.4 F | OXYGEN SATURATION: 94 % | WEIGHT: 253.12 LBS | DIASTOLIC BLOOD PRESSURE: 92 MMHG

## 2025-03-11 DIAGNOSIS — R42 VERTIGO: Primary | ICD-10-CM

## 2025-03-11 PROCEDURE — 36415 COLL VENOUS BLD VENIPUNCTURE: CPT

## 2025-03-11 RX ORDER — MECLIZINE HYDROCHLORIDE 25 MG/1
25 TABLET ORAL 3 TIMES DAILY PRN
Qty: 30 TABLET | Refills: 2 | Status: SHIPPED | OUTPATIENT
Start: 2025-03-11

## 2025-03-11 RX ORDER — INSULIN GLARGINE 100 [IU]/ML
60 INJECTION, SOLUTION SUBCUTANEOUS AT BEDTIME
COMMUNITY
Start: 2024-12-19

## 2025-03-11 RX ORDER — ONDANSETRON 4 MG/1
4 TABLET, ORALLY DISINTEGRATING ORAL EVERY 8 HOURS PRN
Qty: 30 TABLET | Refills: 2 | Status: SHIPPED | OUTPATIENT
Start: 2025-03-11

## 2025-03-11 ASSESSMENT — PATIENT HEALTH QUESTIONNAIRE - PHQ9
10. IF YOU CHECKED OFF ANY PROBLEMS, HOW DIFFICULT HAVE THESE PROBLEMS MADE IT FOR YOU TO DO YOUR WORK, TAKE CARE OF THINGS AT HOME, OR GET ALONG WITH OTHER PEOPLE: NOT DIFFICULT AT ALL
SUM OF ALL RESPONSES TO PHQ QUESTIONS 1-9: 1
SUM OF ALL RESPONSES TO PHQ QUESTIONS 1-9: 1

## 2025-03-11 NOTE — PATIENT INSTRUCTIONS
Right sided      Left sided      Thank you for choosing the [facility], it was a pleasure to see you today!    Please take a look at the information below for more specific details regarding the treatment plan and recommendations.    -In this after visit summary is a list of your medications and specific instructions.  Please review this carefully as there may be changes made to your medication list.  -If there are any particular questions or concerns, please feel free to reach out.  -If any labs have been completed, we will reach out to you about results.  If the results are normal or not concerning, a letter or [employer] message will be sent to you.  If any follow-up is needed, either RJ or the nurse will give you a call.  If you have not heard regarding results after 2 weeks, please reach out to the clinic.    Patient Instructions:    - Orders for medications sent    - PT will call you to schedule vestibular therapy    Please seek immediate medical attention (go to the emergency room or urgent care) for the following reasons: worsening symptoms, or any concerning changes.      --------------------------------------------------------------------------------------------------------------------    -We are always looking for ways to improve.  You may be selected to receive a survey regarding your visit today.  We encourage you to complete the survey and provide specific, constructive feedback to help us improve our processes. If you answer the survey/phone call this will also prompt them to STOP calling you, otherwise they will try again each day for several day! Thank you for your time!    -Please review the contact information listed on the after visit summary and in the electronic chart.  Below is the phone number that we have on file.  If there are any changes that are needed to be made, please reach out to the clinic.  [phone] (home)

## 2025-03-11 NOTE — PROGRESS NOTES
Assessment & Plan     Vertigo    - Considering vestibular migraine (given past history of migraines) vs benign paroxysmal positional vertigo vs postoral hypotension.  Given statements with Hortencia Hallpike maneuver more likely benign paroxysmal positional vertigo.   -If PT vestibular therapy not effective would consider Audiometry to rule out Ménière's disease given relatively new tinnitus.  Although this is less likely with no noted hearing loss.   -Zofran and meclizine orders given for symptomatic treatment.  She is breast-feeding with limited studies on these medications with recommendation to use only as needed for severe symptoms.    - Basic metabolic panel  (Ca, Cl, CO2, Creat, Gluc, K, Na, BUN)  - TSH with free T4 reflex  - meclizine (ANTIVERT) 25 MG tablet  Dispense: 30 tablet; Refill: 2  - ondansetron (ZOFRAN ODT) 4 MG ODT tab  Dispense: 30 tablet; Refill: 2  - Physical Therapy  Referral      Please seek immediate medical attention (go to the emergency room or urgent care) if symptoms worsen, or for concerning changes.    Follow-up with PCP for annual visit, if symptoms do not improve as anticipated, and as needed for acute concern                Darnell Torres is a 39 year old, presenting for the following health issues:  Dizziness        3/11/2025     2:00 PM   Additional Questions   Roomed by saad vaughn     History of Present Illness       Reason for visit:  Vertigo  Symptom onset:  3-4 weeks ago  Symptoms include:  Moderate vertigo feeling dizzy occasionally stumblibg  Symptom intensity:  Moderate  Symptom progression:  Staying the same  Had these symptoms before:  No  What makes it worse:  Laying down standing up looking down looking up  What makes it better:  No   She is taking medications regularly.        Vertigo has been ongoing for 3-4 weeks. Has not had similar experience to this in the past. Blood pressure today 137/92 and orthostatic blood pressures dropping 10 points systolically and  "diastolically with dizziness noted after position change. Balance today normal today in clinic but does have issues daily since onset with feeling unstable. Denies ear pain or recent upper respiratory infection, most recently 6 weeks ago.  Vertigo is worse when going from laying to standing, when laying down, when looking up.  Does additionally endorse some nausea and headaches.  What is related to headaches has something not new not quite like migraine she has had in the past and likely related to small children at home and lack of sleep. Denies vomiting, shortness of breath, sweating, chest pain, irregular heart beats, syncarpy, severe headache, or vision changes.         Objective    BP (!) 137/92 (BP Location: Right arm, Patient Position: Standing, Cuff Size: Adult Large)   Pulse 111   Temp 99.4  F (37.4  C) (Oral)   Resp 16   Ht 1.628 m (5' 4.09\")   Wt 114.8 kg (253 lb 1.9 oz)   SpO2 94%   Breastfeeding Yes   BMI 43.32 kg/m    Body mass index is 43.32 kg/m .  Physical Exam   GENERAL: alert and no distress  EYES: Eyes grossly normal to inspection and nystagmus with Hortencia-Hallpike maneuver  HENT: normal cephalic/atraumatic, right ear: normal TM mobility on insufflation and normal tympanic membrane and canal without signs of infection tympanic membrane with small amount effusion, and left ear: normal: no effusions, no erythema, normal landmarks  NECK: no adenopathy, no asymmetry, masses, or scars  RESP: lungs clear to auscultation - no rales, rhonchi or wheezes  CV: regular rate and rhythm, normal S1 S2, no S3 or S4, no murmur, click or rub, no peripheral edema  NEURO: Normal strength and tone, mentation intact and speech normal  PSYCH: mentation appears normal, affect normal/bright    No results found for any visits on 03/11/25.  No results found for this or any previous visit (from the past 24 hours).      Amount of time spent in chart review, direct patient contact, care coordination, and related " activities to patient care on the day of appointment: 55 minutes.     Signed Electronically by: JAKE Grey CNP

## 2025-03-12 LAB — TSH SERPL DL<=0.005 MIU/L-ACNC: 1.42 UIU/ML (ref 0.3–4.2)

## 2025-03-13 LAB
ANION GAP SERPL CALCULATED.3IONS-SCNC: 10 MMOL/L (ref 7–15)
BUN SERPL-MCNC: 15.7 MG/DL (ref 6–20)
CALCIUM SERPL-MCNC: ABNORMAL MG/DL
CHLORIDE SERPL-SCNC: 98 MMOL/L (ref 98–107)
CREAT SERPL-MCNC: 0.64 MG/DL (ref 0.51–0.95)
EGFRCR SERPLBLD CKD-EPI 2021: >90 ML/MIN/1.73M2
GLUCOSE SERPL-MCNC: 222 MG/DL (ref 70–99)
HCO3 SERPL-SCNC: 30 MMOL/L (ref 22–29)
POTASSIUM SERPL-SCNC: 4.7 MMOL/L (ref 3.4–5.3)
SODIUM SERPL-SCNC: 138 MMOL/L (ref 135–145)

## 2025-03-16 LAB — MISCELLANEOUS TEST 1 (ARUP): NORMAL

## 2025-04-20 ENCOUNTER — HEALTH MAINTENANCE LETTER (OUTPATIENT)
Age: 40
End: 2025-04-20

## 2025-05-11 ENCOUNTER — HEALTH MAINTENANCE LETTER (OUTPATIENT)
Age: 40
End: 2025-05-11

## 2025-08-03 ENCOUNTER — HEALTH MAINTENANCE LETTER (OUTPATIENT)
Age: 40
End: 2025-08-03

## 2025-08-06 SDOH — HEALTH STABILITY: PHYSICAL HEALTH: ON AVERAGE, HOW MANY MINUTES DO YOU ENGAGE IN EXERCISE AT THIS LEVEL?: 40 MIN

## 2025-08-06 SDOH — HEALTH STABILITY: PHYSICAL HEALTH: ON AVERAGE, HOW MANY DAYS PER WEEK DO YOU ENGAGE IN MODERATE TO STRENUOUS EXERCISE (LIKE A BRISK WALK)?: 3 DAYS

## 2025-08-06 ASSESSMENT — SOCIAL DETERMINANTS OF HEALTH (SDOH): HOW OFTEN DO YOU GET TOGETHER WITH FRIENDS OR RELATIVES?: PATIENT DECLINED

## 2025-08-06 ASSESSMENT — ASTHMA QUESTIONNAIRES
ACT_TOTALSCORE: 25
QUESTION_3 LAST FOUR WEEKS HOW OFTEN DID YOUR ASTHMA SYMPTOMS (WHEEZING, COUGHING, SHORTNESS OF BREATH, CHEST TIGHTNESS OR PAIN) WAKE YOU UP AT NIGHT OR EARLIER THAN USUAL IN THE MORNING: NOT AT ALL
QUESTION_1 LAST FOUR WEEKS HOW MUCH OF THE TIME DID YOUR ASTHMA KEEP YOU FROM GETTING AS MUCH DONE AT WORK, SCHOOL OR AT HOME: NONE OF THE TIME
QUESTION_4 LAST FOUR WEEKS HOW OFTEN HAVE YOU USED YOUR RESCUE INHALER OR NEBULIZER MEDICATION (SUCH AS ALBUTEROL): NOT AT ALL
QUESTION_2 LAST FOUR WEEKS HOW OFTEN HAVE YOU HAD SHORTNESS OF BREATH: NOT AT ALL
QUESTION_5 LAST FOUR WEEKS HOW WOULD YOU RATE YOUR ASTHMA CONTROL: COMPLETELY CONTROLLED

## 2025-08-11 ENCOUNTER — OFFICE VISIT (OUTPATIENT)
Dept: FAMILY MEDICINE | Facility: CLINIC | Age: 40
End: 2025-08-11
Payer: COMMERCIAL

## 2025-08-11 VITALS
TEMPERATURE: 99 F | RESPIRATION RATE: 16 BRPM | OXYGEN SATURATION: 95 % | DIASTOLIC BLOOD PRESSURE: 89 MMHG | BODY MASS INDEX: 40.04 KG/M2 | WEIGHT: 226 LBS | HEART RATE: 100 BPM | HEIGHT: 63 IN | SYSTOLIC BLOOD PRESSURE: 135 MMHG

## 2025-08-11 DIAGNOSIS — Z00.00 ROUTINE GENERAL MEDICAL EXAMINATION AT A HEALTH CARE FACILITY: Primary | ICD-10-CM

## 2025-08-11 DIAGNOSIS — Z13.6 SCREENING FOR CARDIOVASCULAR CONDITION: ICD-10-CM

## 2025-08-11 DIAGNOSIS — E11.9 TYPE 2 DIABETES MELLITUS WITHOUT COMPLICATION, WITHOUT LONG-TERM CURRENT USE OF INSULIN (H): ICD-10-CM

## 2025-08-11 DIAGNOSIS — I10 PRIMARY HYPERTENSION: ICD-10-CM

## 2025-08-11 DIAGNOSIS — E66.01 MORBID OBESITY WITH BODY MASS INDEX OF 40.0-49.9 (H): ICD-10-CM

## 2025-08-11 LAB
CHOLEST SERPL-MCNC: 254 MG/DL
CREAT UR-MCNC: 278 MG/DL
EST. AVERAGE GLUCOSE BLD GHB EST-MCNC: 151 MG/DL
FASTING STATUS PATIENT QL REPORTED: YES
HBA1C MFR BLD: 6.9 % (ref 0–5.6)
HDLC SERPL-MCNC: 47 MG/DL
LDLC SERPL CALC-MCNC: 180 MG/DL
MICROALBUMIN UR-MCNC: 19.1 MG/L
MICROALBUMIN/CREAT UR: 6.87 MG/G CR (ref 0–25)
NONHDLC SERPL-MCNC: 207 MG/DL
TRIGL SERPL-MCNC: 134 MG/DL

## 2025-08-11 PROCEDURE — 80061 LIPID PANEL: CPT

## 2025-08-11 PROCEDURE — 3079F DIAST BP 80-89 MM HG: CPT

## 2025-08-11 PROCEDURE — 3044F HG A1C LEVEL LT 7.0%: CPT

## 2025-08-11 PROCEDURE — 83036 HEMOGLOBIN GLYCOSYLATED A1C: CPT

## 2025-08-11 PROCEDURE — 36415 COLL VENOUS BLD VENIPUNCTURE: CPT

## 2025-08-11 PROCEDURE — 3075F SYST BP GE 130 - 139MM HG: CPT

## 2025-08-11 PROCEDURE — 82043 UR ALBUMIN QUANTITATIVE: CPT

## 2025-08-11 PROCEDURE — 82570 ASSAY OF URINE CREATININE: CPT

## 2025-08-11 PROCEDURE — 99396 PREV VISIT EST AGE 40-64: CPT

## 2025-08-11 RX ORDER — NIFEDIPINE 30 MG
30 TABLET, EXTENDED RELEASE ORAL DAILY
Qty: 90 TABLET | Refills: 2 | Status: SHIPPED | OUTPATIENT
Start: 2025-08-11

## 2025-08-12 ENCOUNTER — RESULTS FOLLOW-UP (OUTPATIENT)
Dept: FAMILY MEDICINE | Facility: CLINIC | Age: 40
End: 2025-08-12
Payer: COMMERCIAL

## 2025-08-12 DIAGNOSIS — E11.9 TYPE 2 DIABETES MELLITUS WITHOUT COMPLICATION, WITHOUT LONG-TERM CURRENT USE OF INSULIN (H): Primary | ICD-10-CM

## 2025-08-12 RX ORDER — METFORMIN HYDROCHLORIDE 500 MG/1
500 TABLET, EXTENDED RELEASE ORAL
Qty: 90 TABLET | Refills: 1 | Status: SHIPPED | OUTPATIENT
Start: 2025-08-12

## (undated) DEVICE — SOL NACL 0.9% IRRIG 500ML BOTTLE 2F7123

## (undated) DEVICE — DRAPE MAYO STAND 23X54 8337

## (undated) DEVICE — SOL WATER IRRIG 1000ML BOTTLE 07139-09

## (undated) DEVICE — GLOVE BIOGEL PI ULTRATOUCH G SZ 7.0 42170

## (undated) DEVICE — LINEN FULL SHEET 5511

## (undated) DEVICE — SUTURE MONOCRYL+ 4-0 PS-2 27IN MCP426H

## (undated) DEVICE — PACK MINOR SINGLE BASIN SSK3001

## (undated) DEVICE — PREP CHLORAPREP 26ML TINTED ORANGE  260815

## (undated) DEVICE — DRSG STERI STRIP 1/4X3" R1541

## (undated) DEVICE — SU PLAIN 0 TIE 54" S104H

## (undated) DEVICE — SOL NACL 0.9% IRRIG 1000ML BOTTLE 07138-09

## (undated) DEVICE — DRSG TELFA ISLAND 4X10"

## (undated) DEVICE — SOL WATER IRRIG 1000ML BOTTLE 2F7114

## (undated) DEVICE — ENDO TROCAR 11MM VERSASTEP VS101011P

## (undated) DEVICE — STPL SKIN SUBCUTICULAR INSORB  2030

## (undated) DEVICE — SUCTION MANIFOLD NEPTUNE 2 SYS 1 PORT 702-025-000

## (undated) DEVICE — PACK GYN LAPAROSCOPY RIDGES

## (undated) DEVICE — BAG BIOHAZARD RED SMALL 24X23" A4823PR

## (undated) DEVICE — TUBING SUCTION 12"X1/4" N612

## (undated) DEVICE — STOCKING SLEEVE COMPRESSION CALF LG

## (undated) DEVICE — BAG CLEAR TRASH 1.3M 39X33" P4040C

## (undated) DEVICE — GLOVE BIOGEL PI ULTRATOUCH G SZ 6.5 42165

## (undated) DEVICE — SU MONOCRYL 3-0 SH 27" UND Y416H

## (undated) DEVICE — BNDG KLING 2" 2231

## (undated) DEVICE — ESU PENCIL W/HOLSTER E2350H

## (undated) DEVICE — MAT FLOOR SURGICAL 40X38 0702140238

## (undated) DEVICE — LINEN ORTHO PACK 5446

## (undated) DEVICE — PAD CHUX UNDERPAD 30X36" P3036C

## (undated) DEVICE — GLOVE PROTEXIS POWDER FREE SMT 6.5  2D72PT65X

## (undated) DEVICE — Device

## (undated) DEVICE — DRSG ABDOMINAL 07 1/2X8" 7197D

## (undated) DEVICE — GOWN LG DISP 9515

## (undated) DEVICE — LINEN POUCH DBL 5427

## (undated) DEVICE — SU VICRYL 0 CT-1 27" J340H

## (undated) DEVICE — GLOVE PROTEXIS W/NEU-THERA 6.0  2D73TE60

## (undated) DEVICE — SUCTION CANISTER MEDIVAC LINER 3000ML W/LID 65651-530

## (undated) DEVICE — SU VICRYL 4-0 SH 27" UND J415H

## (undated) DEVICE — RETR PANNICULUS TRAXI FOR PT POSITIONING PRS-1030

## (undated) DEVICE — SU PDS II 0 CTX 60" Z990G

## (undated) DEVICE — NDL INSUFFLATION 14GA STEP S100000

## (undated) DEVICE — GOWN IMPERVIOUS BREATHABLE SMART LG 89015

## (undated) DEVICE — SUTURE VICRYL+ 0 36IN CTX VIOLET VCP978H

## (undated) DEVICE — ENDO TROCAR OPTICAL 05MM VERSAPORT V2 LONG ONB5LGF

## (undated) DEVICE — ESU GROUND PAD ADULT W/CORD E7507

## (undated) DEVICE — DRSG MEPILEX BORDER ADHS 4INX12IN STRL 496605

## (undated) DEVICE — SU MONOCRYL 4-0 PS-2 27" UND Y426H

## (undated) DEVICE — DRSG GAUZE 4X4" 3033

## (undated) DEVICE — SOL NACL 0.9% IRRIG 1000ML BOTTLE 2F7124

## (undated) DEVICE — UNDERPAD 30X30 BULK 949B10

## (undated) DEVICE — ENDO TROCAR FIRST ENTRY KII FIOS Z-THRD 05X100MM CTF03

## (undated) DEVICE — BRIEF STRETCH XL MPS40

## (undated) DEVICE — PACK C-SECTION LF PL15OTA83B

## (undated) DEVICE — CUSTOM PACK C-SECTION LHE

## (undated) DEVICE — DRSG TELFA 3X4" 1050

## (undated) DEVICE — SOL WATER IRRIG 500ML BOTTLE 2F7113

## (undated) DEVICE — DRSG STERI STRIP 1/2X4" R1547

## (undated) DEVICE — STRAP KNEE/BODY 31143004

## (undated) DEVICE — SU VICRYL+ 2-0 XLH 27IN VIO VCP581G

## (undated) DEVICE — ESU GROUND PAD ADULT REM W/15' CORD E7507DB

## (undated) DEVICE — LINEN HALF SHEET 5512

## (undated) DEVICE — SOL NACL 0.9% INJ 1000ML BAG 2B1324X

## (undated) DEVICE — ENDO SPONGE ENDOSTICK KITTNER 5MM CHERRY 37002010

## (undated) DEVICE — PAD PERI INDIV WRAP 11" 2022A

## (undated) DEVICE — DRSG STERI STRIP 1X5" R1548

## (undated) DEVICE — PREP DYNA-HEX 4% CHG SCRUB 4OZ BOTTLE MDS098710

## (undated) DEVICE — DRSG BANDAID 1X3" FABRIC LATEX FREE

## (undated) DEVICE — SPONGE LAP 18X18" X8435

## (undated) DEVICE — COVER CAMERA IN-LIGHT DISP LT-C02

## (undated) DEVICE — SUCTION TIP POOLE K770

## (undated) DEVICE — ESU HARMONIC ACE LAP SHEARS STRYKER ACE+ 7 5MMX36CM HARH36

## (undated) DEVICE — SU VICRYL 2-0 CT-1 27" UND J259H

## (undated) DEVICE — GLOVE BIOGEL PI MICRO SZ 7.0 48570

## (undated) DEVICE — SOL ADH LIQUID BENZOIN SWAB 0.6ML C1544

## (undated) DEVICE — CUSTOM PACK PERI GYN SMA5BPGHEA

## (undated) DEVICE — PREP CHLORAPREP 26ML TINTED HI-LITE ORANGE 930815

## (undated) DEVICE — ENDO TROCAR FIRST ENTRY KII FIOS Z-THRD 11X100MM CTF33

## (undated) DEVICE — SUCTION IRR STRYKERFLOW II W/TIP 250-070-520

## (undated) DEVICE — CATH TRAY FOLEY SURESTEP 16FR WDRAIN BAG STLK LATEX A300316A

## (undated) DEVICE — PACK HAND CUSTOM ASC

## (undated) RX ORDER — NALBUPHINE HYDROCHLORIDE 10 MG/ML
INJECTION, SOLUTION INTRAMUSCULAR; INTRAVENOUS; SUBCUTANEOUS
Status: DISPENSED
Start: 2022-07-29

## (undated) RX ORDER — LIDOCAINE HYDROCHLORIDE AND EPINEPHRINE 10; 10 MG/ML; UG/ML
INJECTION, SOLUTION INFILTRATION; PERINEURAL
Status: DISPENSED
Start: 2024-01-18

## (undated) RX ORDER — TRANEXAMIC ACID 100 MG/ML
INJECTION, SOLUTION INTRAVENOUS
Status: DISPENSED
Start: 2024-08-02

## (undated) RX ORDER — KETOROLAC TROMETHAMINE 30 MG/ML
INJECTION, SOLUTION INTRAMUSCULAR; INTRAVENOUS
Status: DISPENSED
Start: 2022-07-29

## (undated) RX ORDER — EPINEPHRINE 1 MG/ML
INJECTION, SOLUTION, CONCENTRATE INTRAVENOUS
Status: DISPENSED
Start: 2022-07-29

## (undated) RX ORDER — CEFAZOLIN SODIUM 1 G/3ML
INJECTION, POWDER, FOR SOLUTION INTRAMUSCULAR; INTRAVENOUS
Status: DISPENSED
Start: 2022-07-29

## (undated) RX ORDER — OXYTOCIN/0.9 % SODIUM CHLORIDE 30/500 ML
PLASTIC BAG, INJECTION (ML) INTRAVENOUS
Status: DISPENSED
Start: 2022-07-29

## (undated) RX ORDER — FENTANYL CITRATE 50 UG/ML
INJECTION, SOLUTION INTRAMUSCULAR; INTRAVENOUS
Status: DISPENSED
Start: 2018-07-05

## (undated) RX ORDER — DIPHENHYDRAMINE HYDROCHLORIDE 50 MG/ML
INJECTION INTRAMUSCULAR; INTRAVENOUS
Status: DISPENSED
Start: 2022-07-29

## (undated) RX ORDER — PHENYLEPHRINE HCL IN 0.9% NACL 1 MG/10 ML
SYRINGE (ML) INTRAVENOUS
Status: DISPENSED
Start: 2018-07-05

## (undated) RX ORDER — BUPIVACAINE HYDROCHLORIDE 2.5 MG/ML
INJECTION, SOLUTION EPIDURAL; INFILTRATION; INTRACAUDAL
Status: DISPENSED
Start: 2022-07-29

## (undated) RX ORDER — MORPHINE SULFATE 1 MG/ML
INJECTION, SOLUTION EPIDURAL; INTRATHECAL; INTRAVENOUS
Status: DISPENSED
Start: 2024-08-02

## (undated) RX ORDER — EPHEDRINE SULFATE 50 MG/ML
INJECTION, SOLUTION INTRAMUSCULAR; INTRAVENOUS; SUBCUTANEOUS
Status: DISPENSED
Start: 2022-07-29

## (undated) RX ORDER — FENTANYL CITRATE-0.9 % NACL/PF 10 MCG/ML
PLASTIC BAG, INJECTION (ML) INTRAVENOUS
Status: DISPENSED
Start: 2022-07-29

## (undated) RX ORDER — BUPIVACAINE HYDROCHLORIDE 2.5 MG/ML
INJECTION, SOLUTION EPIDURAL; INFILTRATION; INTRACAUDAL
Status: DISPENSED
Start: 2024-01-18

## (undated) RX ORDER — GLYCOPYRROLATE 0.2 MG/ML
INJECTION INTRAMUSCULAR; INTRAVENOUS
Status: DISPENSED
Start: 2018-07-05

## (undated) RX ORDER — PHENYLEPHRINE HCL IN 0.9% NACL 50MG/250ML
PLASTIC BAG, INJECTION (ML) INTRAVENOUS
Status: DISPENSED
Start: 2022-07-29

## (undated) RX ORDER — NEOSTIGMINE METHYLSULFATE 1 MG/ML
VIAL (ML) INJECTION
Status: DISPENSED
Start: 2018-07-05

## (undated) RX ORDER — PROPOFOL 10 MG/ML
INJECTION, EMULSION INTRAVENOUS
Status: DISPENSED
Start: 2018-07-05

## (undated) RX ORDER — BUPIVACAINE HYDROCHLORIDE 2.5 MG/ML
INJECTION, SOLUTION EPIDURAL; INFILTRATION; INTRACAUDAL
Status: DISPENSED
Start: 2018-07-05

## (undated) RX ORDER — DEXAMETHASONE SODIUM PHOSPHATE 4 MG/ML
INJECTION, SOLUTION INTRA-ARTICULAR; INTRALESIONAL; INTRAMUSCULAR; INTRAVENOUS; SOFT TISSUE
Status: DISPENSED
Start: 2018-07-05

## (undated) RX ORDER — TRANEXAMIC ACID 10 MG/ML
INJECTION, SOLUTION INTRAVENOUS
Status: DISPENSED
Start: 2022-07-29

## (undated) RX ORDER — HYDROMORPHONE HYDROCHLORIDE 1 MG/ML
INJECTION, SOLUTION INTRAMUSCULAR; INTRAVENOUS; SUBCUTANEOUS
Status: DISPENSED
Start: 2018-07-05

## (undated) RX ORDER — LIDOCAINE HYDROCHLORIDE 10 MG/ML
INJECTION, SOLUTION EPIDURAL; INFILTRATION; INTRACAUDAL; PERINEURAL
Status: DISPENSED
Start: 2018-07-05

## (undated) RX ORDER — ONDANSETRON 2 MG/ML
INJECTION INTRAMUSCULAR; INTRAVENOUS
Status: DISPENSED
Start: 2022-07-29

## (undated) RX ORDER — LIDOCAINE HYDROCHLORIDE 10 MG/ML
INJECTION, SOLUTION EPIDURAL; INFILTRATION; INTRACAUDAL; PERINEURAL
Status: DISPENSED
Start: 2023-08-24

## (undated) RX ORDER — FENTANYL CITRATE 50 UG/ML
INJECTION, SOLUTION INTRAMUSCULAR; INTRAVENOUS
Status: DISPENSED
Start: 2024-08-02

## (undated) RX ORDER — FENTANYL CITRATE 50 UG/ML
INJECTION, SOLUTION INTRAMUSCULAR; INTRAVENOUS
Status: DISPENSED
Start: 2023-08-24

## (undated) RX ORDER — MORPHINE SULFATE 2 MG/ML
INJECTION, SOLUTION INTRAMUSCULAR; INTRAVENOUS
Status: DISPENSED
Start: 2022-07-29

## (undated) RX ORDER — ONDANSETRON 2 MG/ML
INJECTION INTRAMUSCULAR; INTRAVENOUS
Status: DISPENSED
Start: 2023-08-24

## (undated) RX ORDER — DEXAMETHASONE SODIUM PHOSPHATE 10 MG/ML
INJECTION, EMULSION INTRAMUSCULAR; INTRAVENOUS
Status: DISPENSED
Start: 2023-08-24

## (undated) RX ORDER — LIDOCAINE HYDROCHLORIDE 20 MG/ML
INJECTION, SOLUTION EPIDURAL; INFILTRATION; INTRACAUDAL; PERINEURAL
Status: DISPENSED
Start: 2022-07-29

## (undated) RX ORDER — FENTANYL CITRATE 50 UG/ML
INJECTION, SOLUTION INTRAMUSCULAR; INTRAVENOUS
Status: DISPENSED
Start: 2022-07-29